# Patient Record
Sex: MALE | Race: WHITE | Employment: OTHER | URBAN - METROPOLITAN AREA
[De-identification: names, ages, dates, MRNs, and addresses within clinical notes are randomized per-mention and may not be internally consistent; named-entity substitution may affect disease eponyms.]

---

## 2017-01-03 PROBLEM — E11.65 UNCONTROLLED TYPE 2 DIABETES MELLITUS WITH HYPERGLYCEMIA, WITHOUT LONG-TERM CURRENT USE OF INSULIN (HCC): Status: ACTIVE | Noted: 2017-01-03

## 2017-02-11 ENCOUNTER — APPOINTMENT (OUTPATIENT)
Dept: CT IMAGING | Age: 67
DRG: 064 | End: 2017-02-11
Attending: EMERGENCY MEDICINE
Payer: MEDICARE

## 2017-02-11 ENCOUNTER — HOSPITAL ENCOUNTER (INPATIENT)
Age: 67
LOS: 3 days | Discharge: HOME | DRG: 064 | End: 2017-02-14
Attending: EMERGENCY MEDICINE | Admitting: FAMILY MEDICINE
Payer: MEDICARE

## 2017-02-11 ENCOUNTER — APPOINTMENT (OUTPATIENT)
Dept: GENERAL RADIOLOGY | Age: 67
DRG: 064 | End: 2017-02-11
Attending: EMERGENCY MEDICINE
Payer: MEDICARE

## 2017-02-11 DIAGNOSIS — R55 SYNCOPE AND COLLAPSE: Primary | ICD-10-CM

## 2017-02-11 DIAGNOSIS — R00.1 BRADYCARDIA: ICD-10-CM

## 2017-02-11 DIAGNOSIS — R29.898 WEAKNESS OF BOTH UPPER EXTREMITIES: ICD-10-CM

## 2017-02-11 DIAGNOSIS — R42 DIZZINESS: ICD-10-CM

## 2017-02-11 DIAGNOSIS — R29.898 WEAKNESS OF BOTH LOWER EXTREMITIES: ICD-10-CM

## 2017-02-11 LAB
ALBUMIN SERPL BCP-MCNC: 3.5 G/DL (ref 3.2–4.6)
ALBUMIN/GLOB SERPL: 0.9 {RATIO} (ref 1.2–3.5)
ALP SERPL-CCNC: 87 U/L (ref 50–136)
ALT SERPL-CCNC: 17 U/L (ref 12–65)
ANION GAP BLD CALC-SCNC: 10 MMOL/L (ref 7–16)
AST SERPL W P-5'-P-CCNC: 12 U/L (ref 15–37)
BASOPHILS # BLD AUTO: 0 K/UL (ref 0–0.2)
BASOPHILS # BLD: 0 % (ref 0–2)
BILIRUB SERPL-MCNC: 0.5 MG/DL (ref 0.2–1.1)
BNP SERPL-MCNC: 22 PG/ML
BUN SERPL-MCNC: 19 MG/DL (ref 8–23)
CALCIUM SERPL-MCNC: 8.5 MG/DL (ref 8.3–10.4)
CHLORIDE SERPL-SCNC: 105 MMOL/L (ref 98–107)
CO2 SERPL-SCNC: 26 MMOL/L (ref 21–32)
CREAT SERPL-MCNC: 0.95 MG/DL (ref 0.8–1.5)
DIFFERENTIAL METHOD BLD: ABNORMAL
EOSINOPHIL # BLD: 0.2 K/UL (ref 0–0.8)
EOSINOPHIL NFR BLD: 2 % (ref 0.5–7.8)
ERYTHROCYTE [DISTWIDTH] IN BLOOD BY AUTOMATED COUNT: 12 % (ref 11.9–14.6)
GLOBULIN SER CALC-MCNC: 3.7 G/DL (ref 2.3–3.5)
GLUCOSE BLD STRIP.AUTO-MCNC: 190 MG/DL (ref 65–100)
GLUCOSE SERPL-MCNC: 184 MG/DL (ref 65–100)
HCT VFR BLD AUTO: 41.9 % (ref 41.1–50.3)
HGB BLD-MCNC: 15 G/DL (ref 13.6–17.2)
IMM GRANULOCYTES # BLD: 0 K/UL (ref 0–0.5)
IMM GRANULOCYTES NFR BLD AUTO: 0.2 % (ref 0–5)
INR PPP: 1 (ref 0.9–1.2)
LACTATE BLD-SCNC: 1 MMOL/L (ref 0.5–1.9)
LYMPHOCYTES # BLD AUTO: 17 % (ref 13–44)
LYMPHOCYTES # BLD: 2 K/UL (ref 0.5–4.6)
MAGNESIUM SERPL-MCNC: 2 MG/DL (ref 1.8–2.4)
MCH RBC QN AUTO: 34 PG (ref 26.1–32.9)
MCHC RBC AUTO-ENTMCNC: 35.8 G/DL (ref 31.4–35)
MCV RBC AUTO: 95 FL (ref 79.6–97.8)
MONOCYTES # BLD: 0.7 K/UL (ref 0.1–1.3)
MONOCYTES NFR BLD AUTO: 6 % (ref 4–12)
NEUTS SEG # BLD: 8.5 K/UL (ref 1.7–8.2)
NEUTS SEG NFR BLD AUTO: 75 % (ref 43–78)
PLATELET # BLD AUTO: 289 K/UL (ref 150–450)
PMV BLD AUTO: 9.4 FL (ref 10.8–14.1)
POTASSIUM SERPL-SCNC: 3.6 MMOL/L (ref 3.5–5.1)
PROT SERPL-MCNC: 7.2 G/DL (ref 6.3–8.2)
PROTHROMBIN TIME: 11 SEC (ref 9.6–12)
RBC # BLD AUTO: 4.41 M/UL (ref 4.23–5.67)
SODIUM SERPL-SCNC: 141 MMOL/L (ref 136–145)
TROPONIN I BLD-MCNC: 0.01 NG/ML (ref 0–0.08)
WBC # BLD AUTO: 11.4 K/UL (ref 4.3–11.1)

## 2017-02-11 PROCEDURE — 96374 THER/PROPH/DIAG INJ IV PUSH: CPT | Performed by: EMERGENCY MEDICINE

## 2017-02-11 PROCEDURE — 85025 COMPLETE CBC W/AUTO DIFF WBC: CPT | Performed by: EMERGENCY MEDICINE

## 2017-02-11 PROCEDURE — 70450 CT HEAD/BRAIN W/O DYE: CPT

## 2017-02-11 PROCEDURE — 83605 ASSAY OF LACTIC ACID: CPT

## 2017-02-11 PROCEDURE — 83880 ASSAY OF NATRIURETIC PEPTIDE: CPT | Performed by: EMERGENCY MEDICINE

## 2017-02-11 PROCEDURE — 74011636320 HC RX REV CODE- 636/320: Performed by: EMERGENCY MEDICINE

## 2017-02-11 PROCEDURE — 84484 ASSAY OF TROPONIN QUANT: CPT

## 2017-02-11 PROCEDURE — 80053 COMPREHEN METABOLIC PANEL: CPT | Performed by: EMERGENCY MEDICINE

## 2017-02-11 PROCEDURE — 71010 XR CHEST PORT: CPT

## 2017-02-11 PROCEDURE — 74011250636 HC RX REV CODE- 250/636: Performed by: EMERGENCY MEDICINE

## 2017-02-11 PROCEDURE — 85610 PROTHROMBIN TIME: CPT | Performed by: EMERGENCY MEDICINE

## 2017-02-11 PROCEDURE — 99285 EMERGENCY DEPT VISIT HI MDM: CPT | Performed by: EMERGENCY MEDICINE

## 2017-02-11 PROCEDURE — 83735 ASSAY OF MAGNESIUM: CPT | Performed by: EMERGENCY MEDICINE

## 2017-02-11 PROCEDURE — 70496 CT ANGIOGRAPHY HEAD: CPT

## 2017-02-11 PROCEDURE — 82962 GLUCOSE BLOOD TEST: CPT

## 2017-02-11 PROCEDURE — 93005 ELECTROCARDIOGRAM TRACING: CPT | Performed by: EMERGENCY MEDICINE

## 2017-02-11 PROCEDURE — 65270000029 HC RM PRIVATE

## 2017-02-11 PROCEDURE — 74011000258 HC RX REV CODE- 258: Performed by: EMERGENCY MEDICINE

## 2017-02-11 PROCEDURE — 74011250637 HC RX REV CODE- 250/637: Performed by: FAMILY MEDICINE

## 2017-02-11 RX ORDER — FENTANYL CITRATE 50 UG/ML
75 INJECTION, SOLUTION INTRAMUSCULAR; INTRAVENOUS ONCE
Status: COMPLETED | OUTPATIENT
Start: 2017-02-11 | End: 2017-02-11

## 2017-02-11 RX ORDER — SODIUM CHLORIDE 0.9 % (FLUSH) 0.9 %
5-10 SYRINGE (ML) INJECTION EVERY 8 HOURS
Status: DISCONTINUED | OUTPATIENT
Start: 2017-02-11 | End: 2017-02-14 | Stop reason: HOSPADM

## 2017-02-11 RX ORDER — ENOXAPARIN SODIUM 100 MG/ML
40 INJECTION SUBCUTANEOUS EVERY 24 HOURS
Status: DISCONTINUED | OUTPATIENT
Start: 2017-02-12 | End: 2017-02-14

## 2017-02-11 RX ORDER — PANTOPRAZOLE SODIUM 40 MG/1
40 TABLET, DELAYED RELEASE ORAL
Status: DISCONTINUED | OUTPATIENT
Start: 2017-02-12 | End: 2017-02-14 | Stop reason: HOSPADM

## 2017-02-11 RX ORDER — DEXTROSE 40 %
15 GEL (GRAM) ORAL AS NEEDED
Status: DISCONTINUED | OUTPATIENT
Start: 2017-02-11 | End: 2017-02-14 | Stop reason: HOSPADM

## 2017-02-11 RX ORDER — ACETAMINOPHEN 325 MG/1
650 TABLET ORAL
Status: DISCONTINUED | OUTPATIENT
Start: 2017-02-11 | End: 2017-02-14 | Stop reason: HOSPADM

## 2017-02-11 RX ORDER — DEXTROSE 50 % IN WATER (D50W) INTRAVENOUS SYRINGE
25-50 AS NEEDED
Status: DISCONTINUED | OUTPATIENT
Start: 2017-02-11 | End: 2017-02-14 | Stop reason: HOSPADM

## 2017-02-11 RX ORDER — BISACODYL 5 MG
5 TABLET, DELAYED RELEASE (ENTERIC COATED) ORAL DAILY PRN
Status: DISCONTINUED | OUTPATIENT
Start: 2017-02-11 | End: 2017-02-14 | Stop reason: HOSPADM

## 2017-02-11 RX ORDER — SODIUM CHLORIDE 0.9 % (FLUSH) 0.9 %
10 SYRINGE (ML) INJECTION
Status: COMPLETED | OUTPATIENT
Start: 2017-02-11 | End: 2017-02-11

## 2017-02-11 RX ORDER — LOSARTAN POTASSIUM 25 MG/1
25 TABLET ORAL DAILY
Status: DISCONTINUED | OUTPATIENT
Start: 2017-02-12 | End: 2017-02-14 | Stop reason: HOSPADM

## 2017-02-11 RX ORDER — SODIUM CHLORIDE 0.9 % (FLUSH) 0.9 %
5-10 SYRINGE (ML) INJECTION AS NEEDED
Status: DISCONTINUED | OUTPATIENT
Start: 2017-02-11 | End: 2017-02-14 | Stop reason: HOSPADM

## 2017-02-11 RX ORDER — ATORVASTATIN CALCIUM 40 MG/1
40 TABLET, FILM COATED ORAL
Status: DISCONTINUED | OUTPATIENT
Start: 2017-02-11 | End: 2017-02-14 | Stop reason: HOSPADM

## 2017-02-11 RX ORDER — ASPIRIN 81 MG/1
81 TABLET ORAL DAILY
Status: DISCONTINUED | OUTPATIENT
Start: 2017-02-12 | End: 2017-02-14

## 2017-02-11 RX ADMIN — ACETAMINOPHEN 650 MG: 325 TABLET, FILM COATED ORAL at 23:49

## 2017-02-11 RX ADMIN — IOVERSOL 80 ML: 741 INJECTION INTRA-ARTERIAL; INTRAVENOUS at 20:30

## 2017-02-11 RX ADMIN — ATORVASTATIN CALCIUM 40 MG: 40 TABLET, FILM COATED ORAL at 23:49

## 2017-02-11 RX ADMIN — Medication 10 ML: at 20:30

## 2017-02-11 RX ADMIN — Medication 10 ML: at 23:51

## 2017-02-11 RX ADMIN — SODIUM CHLORIDE 100 ML: 900 INJECTION, SOLUTION INTRAVENOUS at 20:30

## 2017-02-11 RX ADMIN — FENTANYL CITRATE 75 MCG: 50 INJECTION, SOLUTION INTRAMUSCULAR; INTRAVENOUS at 19:11

## 2017-02-11 NOTE — IP AVS SNAPSHOT
[address] 
[phone] Patient: Frankey Crome MRN: [id number] HTM:9/4/0623 You are allergic to the following No active allergies Immunizations Administered for This Admission Name Date  
 TB Skin Test (PPD) Intradermal  Deferred () Recent Documentation Height Weight BMI Smoking Status 1.854 m 86.8 kg 25.25 kg/m2 Former Smoker Emergency Contacts Name Discharge Info Relation Home Work Adri Fraire  Spouse [3] [phone] About your hospitalization You were admitted on:  February 11, 2017 You last received care in the:  [facility] 2 SURGICAL You were discharged on:  February 14, 2017 Unit phone number:  [phone] Why you were hospitalized Your primary diagnosis was:  Syncope Your diagnoses also included: Tobacco Use Disorder, Chronic Kidney Disease, Stage Ii (Mild), Essential Hypertension, Uncontrolled Type 2 Diabetes Mellitus With Hyperglycemia, Without Long-Term Current Use Of Insulin (Hcc), Weakness Of Both Arms Providers Seen During Your Hospitalizations Provider Role Specialty Primary office phone Raj Haro MD Attending Provider Emergency Medicine [phone] Tae Mcfarland MD Attending Provider [facility] [phone] Joaquim Easton MD Attending Provider Internal Medicine [phone] Your Primary Care Physician (PCP) Primary Care Physician Office Phone Office Fax Pilar ROSAS [phone] ** None ** Follow-up Information Follow up With Details Comments Contact Info Laura Arce MD On 2/23/2017 2:30 pm [address] [place] 
[phone] Dr Quintin Ang for F/U , Dr Lázaro Johns to call and arrange appointment Your Appointments Thursday February 23, 2017  2:30 PM EST Extended Office Visit with Laura Arce MD  
 [facility] [place] [id number]  
[phone] Monday March 06, 2017  8:00 AM EST Office Visit with Stephen Barnett MD  
[facility]) [place] [id number]  
[phone] Bring all medications, insurance and prescription cards and please be prepared to pay any copay that may be due. Current Discharge Medication List  
  
START taking these medications Dose & Instructions Dispensing Information Comments Morning Noon Evening Bedtime  
 aspirin 325 mg tablet Commonly known as:  ASPIRIN Start taking on:  2/15/2017 Replaces:  aspirin delayed-release 81 mg tablet Dose:  325 mg Take 1 Tab by mouth daily. Quantity:  30 Tab Refills:  0  
     
2/15/17 CONTINUE these medications which have NOT CHANGED Dose & Instructions Dispensing Information Comments Morning Noon Evening Bedtime  
 atorvastatin 40 mg tablet Commonly known as:  LIPITOR Dose:  40 mg Take 1 Tab by mouth nightly. Quantity:  30 Tab Refills:  3 Blood-Glucose Meter monitoring kit Commonly known as:  [address]   [address] KIT Check blood sugars once daily. Check fasting or 2 hours after a meal. DX E11.65 Quantity:  1 Kit Refills:  0  
     
   
   
   
  
 glucose blood VI test strips strip Commonly known as:  ONETOUCH ULTRA TEST Check blood sugars once daily. Check fasting or 2 hours after a meal. DX E11.65 Quantity:  100 Strip Refills:  3  
     
   
   
   
  
 * Lancets Misc Commonly known as:  ONETOUCH ULTRASOFT LANCETS Check blood sugars once daily. Check fasting or 2 hours after a meal. Dx:  E11.65 Quantity:  100 Each Refills:  3  
     
   
   
   
  
 * lancets 30 gauge Misc Commonly known as:  [employer] LANCETS Check blood sugars once daily. Check fasting or 2 hours after a meal. Dx:  E11.65 Quantity:  100 Lancet Refills:  3  
     
   
   
   
  
 losartan 25 mg tablet Commonly known as:  COZAAR Dose:  25 mg Take 1 Tab by mouth daily. Quantity:  30 Tab Refills:  2  
     
   
   
   
  
 metFORMIN 500 mg tablet Commonly known as:  GLUCOPHAGE Dose:  500 mg Take 1 Tab by mouth two (2) times daily (with meals). Quantity:  60 Tab Refills:  2  
     
2/15/17 2/14/17  
   
  
 nitroglycerin 0.4 mg SL tablet Commonly known as:  NITROSTAT Dose:  0.4 mg  
1 Tab by SubLINGual route every five (5) minutes as needed for Chest Pain. Quantity:  25 Tab Refills:  11  
     
   
   
   
  
 omeprazole 20 mg capsule Commonly known as:  PRILOSEC  
   
 1 tablet daily 30 minutes before a meal  
 Quantity:  90 Cap Refills:  3 2/15/17 * Notice: This list has 2 medication(s) that are the same as other medications prescribed for you. Read the directions carefully, and ask your doctor or other care provider to review them with you. STOP taking these medications   
 aspirin delayed-release 81 mg tablet Replaced by:  aspirin 325 mg tablet  
   
  
 celecoxib 200 mg capsule Commonly known as:  CELEBREX Where to Get Your Medications Information on where to get these meds will be given to you by the nurse or doctor. ! Ask your nurse or doctor about these medications  
  aspirin 325 mg tablet Discharge Instructions DISCHARGE SUMMARY from Nurse The following personal items are in your possession at time of discharge: 
 
Dental Appliances: Uppers, With patient Home Medications: None Jewelry: Ring Clothing: Footwear, Pants, Shirt Other Valuables: None PATIENT INSTRUCTIONS: 
 
 After general anesthesia or intravenous sedation, for 24 hours or while taking prescription Narcotics: · Limit your activities · Do not drive and operate hazardous machinery · Do not make important personal or business decisions · Do  not drink alcoholic beverages · If you have not urinated within 8 hours after discharge, please contact your surgeon on call. Report the following to your surgeon: 
· Excessive pain, swelling, redness or odor of or around the surgical area · Temperature over 100.5 · Nausea and vomiting lasting longer than 4 hours or if unable to take medications · Any signs of decreased circulation or nerve impairment to extremity: change in color, persistent  numbness, tingling, coldness or increase pain · Any questions What to do at Home: 
Recommended activity: Activity as tolerated, per MD instructions If you experience any of the following symptoms fever > 101, nausea, vomiting, pain, chest pain, shortness of breath please follow up with MD. 
 
 
*  Please give a list of your current medications to your Primary Care Provider. *  Please update this list whenever your medications are discontinued, doses are 
    changed, or new medications (including over-the-counter products) are added. *  Please carry medication information at all times in case of emergency situations. These are general instructions for a healthy lifestyle: No smoking/ No tobacco products/ Avoid exposure to second hand smoke Surgeon General's Warning:  Quitting smoking now greatly reduces serious risk to your health. Obesity, smoking, and sedentary lifestyle greatly increases your risk for illness A healthy diet, regular physical exercise & weight monitoring are important for maintaining a healthy lifestyle You may be retaining fluid if you have a history of heart failure or if you experience any of the following symptoms:  Weight gain of 3 pounds or more overnight or 5 pounds in a week, increased swelling in our hands or feet or shortness of breath while lying flat in bed. Please call your doctor as soon as you notice any of these symptoms; do not wait until your next office visit. Recognize signs and symptoms of STROKE: 
 
F-face looks uneven A-arms unable to move or move unevenly S-speech slurred or non-existent T-time-call 911 as soon as signs and symptoms begin-DO NOT go Back to bed or wait to see if you get better-TIME IS BRAIN. Warning Signs of HEART ATTACK Call 911 if you have these symptoms: 
? Chest discomfort. Most heart attacks involve discomfort in the center of the chest that lasts more than a few minutes, or that goes away and comes back. It can feel like uncomfortable pressure, squeezing, fullness, or pain. ? Discomfort in other areas of the upper body. Symptoms can include pain or discomfort in one or both arms, the back, neck, jaw, or stomach. ? Shortness of breath with or without chest discomfort. ? Other signs may include breaking out in a cold sweat, nausea, or lightheadedness. Don't wait more than five minutes to call [address]! Fast action can save your life. Calling 911 is almost always the fastest way to get lifesaving treatment. Emergency Medical Services staff can begin treatment when they arrive  up to an hour sooner than if someone gets to the hospital by car. The discharge information has been reviewed with the patient. The patient verbalized understanding. Discharge medications reviewed with the patient and appropriate educational materials and side effects teaching were provided. Fainting: Care Instructions Your Care Instructions When you faint, or pass out, you lose consciousness for a short time. A brief drop in blood flow to the brain often causes it. When you fall or lie down, more blood flows to your brain and you regain consciousness. Emotional stress, pain, or overheatingespecially if you have been standingcan make you faint. In these cases, fainting is usually not serious. But fainting can be a sign of a more serious problem. Your doctor may want you to have more tests to rule out other causes. The treatment you need depends on the reason why you fainted. The doctor has checked you carefully, but problems can develop later. If you notice any problems or new symptoms, get medical treatment right away. Follow-up care is a key part of your treatment and safety. Be sure to make and go to all appointments, and call your doctor if you are having problems. It's also a good idea to know your test results and keep a list of the medicines you take. How can you care for yourself at home? · Drink plenty of fluids to prevent dehydration. If you have kidney, heart, or liver disease and have to limit fluids, talk with your doctor before you increase your fluid intake. When should you call for help? Call 911 anytime you think you may need emergency care. For example, call if: 
· You have symptoms of a heart problem. These may include: ¨ Chest pain or pressure. ¨ Severe trouble breathing. ¨ A fast or irregular heartbeat. ¨ Lightheadedness or sudden weakness. ¨ Coughing up pink, foamy mucus. ¨ Passing out. After you call 911, the  may tell you to chew 1 adult-strength or 2 to 4 low-dose aspirin. Wait for an ambulance. Do not try to drive yourself. · You have symptoms of a stroke. These may include: 
¨ Sudden numbness, tingling, weakness, or loss of movement in your face, arm, or leg, especially on only one side of your body. ¨ Sudden vision changes. ¨ Sudden trouble speaking. ¨ Sudden confusion or trouble understanding simple statements. ¨ Sudden problems with walking or balance. ¨ A sudden, severe headache that is different from past headaches. · You passed out (lost consciousness) again. Watch closely for changes in your health, and be sure to contact your doctor if: 
· You do not get better as expected. Where can you learn more? Go to http://jose angel-dalia.info/. Enter K293 in the search box to learn more about \"Fainting: Care Instructions. \" Current as of: May 27, 2016 Content Version: 11.1 © 7703-4134 [employer]. Care instructions adapted under license by [employer] (which disclaims liability or warranty for this information). If you have questions about a medical condition or this instruction, always ask your healthcare professional. [place] [address] any warranty or liability for your use of this information. Discharge Orders Procedure Order Date Status Priority Quantity Spec Type Associated Dx NURSING-MISCELLANEOUS: Please print DC summary for pt at time of discharge 02/14/17 1112 Normal Routine 1 Questions: Description of Order:  Please print DC summary for pt at time of discharge ACO Transitions of Care Introducing [employer] [address] offers a voluntary care coordination program to provide high quality service and care to [facility] fee-for-service beneficiaries. Behzad Hernandez was designed to help you enhance your health and well-being through the following services: ? Transitions of Care  support for individuals who are transitioning from one care setting to another (example: Hospital to home). ? Chronic and Complex Care Coordination  support for individuals and caregivers of those with serious or chronic illnesses or with more than one chronic (ongoing) condition and those who take a number of different medications. If you meet specific medical criteria, a [facility] [address] may call you directly to coordinate your care with your primary care physician and your other care providers. For questions about the [facility] programs, please, contact your physicians office. For general questions or additional information about Accountable Care Organizations: 
Please visit www.medicare.gov/acos. html or call 1-800-MEDICARE (4-372.210.9430) TTY users should call 6-216.135.2788. [employer] Announcement We are excited to announce that we are making your provider's discharge notes available to you in [employer]. You will see these notes when they are completed and signed by the physician that discharged you from your recent hospital stay. If you have any questions or concerns about any information you see in [employer], please call the Health Information Department where you were seen or reach out to your Primary Care Provider for more information about your plan of care. Introducing [facility]! Juan Larson introduces [employer] patient portal. Now you can access parts of your medical record, email your doctor's office, and request medication refills online. 1. In your internet browser, go to [email or web address] 2. Click on the First Time User? Click Here link in the Sign In box. You will see the New Member Sign Up page. 3. Enter your [employer] Access Code exactly as it appears below. You will not need to use this code after youve completed the sign-up process. If you do not sign up before the expiration date, you must request a new code. · [employer] Access Code: [email or web address] Expires: 3/8/2017  4:43 PM 
 
4. Enter the last four digits of your Social Security Number (xxxx) and Date of Birth (mm/dd/yyyy) as indicated and click Submit. You will be taken to the next sign-up page. 5. Create a [employer] ID. This will be your [employer] login ID and cannot be changed, so think of one that is secure and easy to remember. 6. Create a [employer] password. You can change your password at any time. 7. Enter your Password Reset Question and Answer.  This can be used at a later time if you forget your password. 8. Enter your e-mail address. You will receive e-mail notification when new information is available in [address] 9. Click Sign Up. You can now view and download portions of your medical record. 10. Click the Download Summary menu link to download a portable copy of your medical information. If you have questions, please visit the Frequently Asked Questions section of the [employer] website. Remember, [employer] is NOT to be used for urgent needs. For medical emergencies, dial 911. Now available from your iPhone and Android! General Information Please provide this summary of care documentation to your next provider. Patient Signature:  ____________________________________________________________ Date:  ____________________________________________________________  
  
Michelle Aburto Provider Signature:  ____________________________________________________________ Date:  ____________________________________________________________

## 2017-02-11 NOTE — ED TRIAGE NOTES
Patient reports 1 episode of chest pain and weakness. States this afternoon had increasing weakness. Family drove patient partially to hospital and that then called EMS. Per EMS, patient was pale, sweaty, cyanotic, and complaining of weakness. Heart rate was in 30s. Patient given 0.5mg Atropine and rate increased to 80s. Patient reports weakness, States unable to raise arms. States shoulder pain and neck pain. Provider at bedside.

## 2017-02-12 ENCOUNTER — APPOINTMENT (OUTPATIENT)
Dept: MRI IMAGING | Age: 67
DRG: 064 | End: 2017-02-12
Attending: PSYCHIATRY & NEUROLOGY
Payer: MEDICARE

## 2017-02-12 ENCOUNTER — APPOINTMENT (OUTPATIENT)
Dept: MRI IMAGING | Age: 67
DRG: 064 | End: 2017-02-12
Attending: FAMILY MEDICINE
Payer: MEDICARE

## 2017-02-12 PROBLEM — R29.898 WEAKNESS OF BOTH ARMS: Status: ACTIVE | Noted: 2017-02-12

## 2017-02-12 LAB
ANION GAP BLD CALC-SCNC: 9 MMOL/L (ref 7–16)
ATRIAL RATE: 84 BPM
BUN SERPL-MCNC: 19 MG/DL (ref 8–23)
CALCIUM SERPL-MCNC: 8.8 MG/DL (ref 8.3–10.4)
CALCULATED P AXIS, ECG09: 68 DEGREES
CALCULATED R AXIS, ECG10: 75 DEGREES
CALCULATED T AXIS, ECG11: 41 DEGREES
CHLORIDE SERPL-SCNC: 105 MMOL/L (ref 98–107)
CHOLEST SERPL-MCNC: 211 MG/DL
CO2 SERPL-SCNC: 26 MMOL/L (ref 21–32)
CREAT SERPL-MCNC: 0.92 MG/DL (ref 0.8–1.5)
DIAGNOSIS, 93000: NORMAL
DIASTOLIC BP, ECG02: NORMAL MMHG
ERYTHROCYTE [DISTWIDTH] IN BLOOD BY AUTOMATED COUNT: 12.2 % (ref 11.9–14.6)
EST. AVERAGE GLUCOSE BLD GHB EST-MCNC: 206 MG/DL
GLUCOSE BLD STRIP.AUTO-MCNC: 170 MG/DL (ref 65–100)
GLUCOSE BLD STRIP.AUTO-MCNC: 246 MG/DL (ref 65–100)
GLUCOSE BLD STRIP.AUTO-MCNC: 303 MG/DL (ref 65–100)
GLUCOSE SERPL-MCNC: 233 MG/DL (ref 65–100)
HBA1C MFR BLD: 8.8 % (ref 4.8–6)
HCT VFR BLD AUTO: 42.3 % (ref 41.1–50.3)
HDLC SERPL-MCNC: 30 MG/DL (ref 40–60)
HDLC SERPL: 7 {RATIO}
HGB BLD-MCNC: 14.8 G/DL (ref 13.6–17.2)
LDLC SERPL CALC-MCNC: 128.8 MG/DL
LIPID PROFILE,FLP: ABNORMAL
MAGNESIUM SERPL-MCNC: 2.2 MG/DL (ref 1.8–2.4)
MCH RBC QN AUTO: 34.1 PG (ref 26.1–32.9)
MCHC RBC AUTO-ENTMCNC: 35 G/DL (ref 31.4–35)
MCV RBC AUTO: 97.5 FL (ref 79.6–97.8)
P-R INTERVAL, ECG05: 198 MS
PLATELET # BLD AUTO: 294 K/UL (ref 150–450)
PMV BLD AUTO: 9.6 FL (ref 10.8–14.1)
POTASSIUM SERPL-SCNC: 3.7 MMOL/L (ref 3.5–5.1)
Q-T INTERVAL, ECG07: 378 MS
QRS DURATION, ECG06: 104 MS
QTC CALCULATION (BEZET), ECG08: 446 MS
RBC # BLD AUTO: 4.34 M/UL (ref 4.23–5.67)
SODIUM SERPL-SCNC: 140 MMOL/L (ref 136–145)
SYSTOLIC BP, ECG01: NORMAL MMHG
T4 FREE SERPL-MCNC: 1.1 NG/DL (ref 0.78–1.46)
TRIGL SERPL-MCNC: 261 MG/DL (ref 35–150)
TSH SERPL DL<=0.005 MIU/L-ACNC: 0.58 UIU/ML (ref 0.36–3.74)
VENTRICULAR RATE, ECG03: 84 BPM
VLDLC SERPL CALC-MCNC: 52.2 MG/DL (ref 6–23)
WBC # BLD AUTO: 9.2 K/UL (ref 4.3–11.1)

## 2017-02-12 PROCEDURE — 84443 ASSAY THYROID STIM HORMONE: CPT | Performed by: FAMILY MEDICINE

## 2017-02-12 PROCEDURE — 80048 BASIC METABOLIC PNL TOTAL CA: CPT | Performed by: FAMILY MEDICINE

## 2017-02-12 PROCEDURE — 80061 LIPID PANEL: CPT | Performed by: FAMILY MEDICINE

## 2017-02-12 PROCEDURE — 84439 ASSAY OF FREE THYROXINE: CPT | Performed by: FAMILY MEDICINE

## 2017-02-12 PROCEDURE — 84481 FREE ASSAY (FT-3): CPT | Performed by: FAMILY MEDICINE

## 2017-02-12 PROCEDURE — 65270000029 HC RM PRIVATE

## 2017-02-12 PROCEDURE — 74011250637 HC RX REV CODE- 250/637: Performed by: FAMILY MEDICINE

## 2017-02-12 PROCEDURE — 83036 HEMOGLOBIN GLYCOSYLATED A1C: CPT | Performed by: FAMILY MEDICINE

## 2017-02-12 PROCEDURE — 74011636637 HC RX REV CODE- 636/637: Performed by: FAMILY MEDICINE

## 2017-02-12 PROCEDURE — 92610 EVALUATE SWALLOWING FUNCTION: CPT

## 2017-02-12 PROCEDURE — 82962 GLUCOSE BLOOD TEST: CPT

## 2017-02-12 PROCEDURE — 70553 MRI BRAIN STEM W/O & W/DYE: CPT

## 2017-02-12 PROCEDURE — 74011250636 HC RX REV CODE- 250/636: Performed by: FAMILY MEDICINE

## 2017-02-12 PROCEDURE — 72141 MRI NECK SPINE W/O DYE: CPT

## 2017-02-12 PROCEDURE — A9577 INJ MULTIHANCE: HCPCS | Performed by: FAMILY MEDICINE

## 2017-02-12 PROCEDURE — 36415 COLL VENOUS BLD VENIPUNCTURE: CPT | Performed by: FAMILY MEDICINE

## 2017-02-12 PROCEDURE — 77030027138 HC INCENT SPIROMETER -A

## 2017-02-12 PROCEDURE — 85027 COMPLETE CBC AUTOMATED: CPT | Performed by: FAMILY MEDICINE

## 2017-02-12 PROCEDURE — 83735 ASSAY OF MAGNESIUM: CPT | Performed by: FAMILY MEDICINE

## 2017-02-12 RX ORDER — SODIUM CHLORIDE 0.9 % (FLUSH) 0.9 %
10 SYRINGE (ML) INJECTION
Status: COMPLETED | OUTPATIENT
Start: 2017-02-12 | End: 2017-02-12

## 2017-02-12 RX ORDER — HYDROCODONE BITARTRATE AND ACETAMINOPHEN 7.5; 325 MG/1; MG/1
1 TABLET ORAL
Status: DISCONTINUED | OUTPATIENT
Start: 2017-02-12 | End: 2017-02-14 | Stop reason: HOSPADM

## 2017-02-12 RX ADMIN — LOSARTAN POTASSIUM 25 MG: 25 TABLET, FILM COATED ORAL at 08:16

## 2017-02-12 RX ADMIN — INSULIN HUMAN 4 UNITS: 100 INJECTION, SOLUTION PARENTERAL at 08:16

## 2017-02-12 RX ADMIN — ASPIRIN 81 MG: 81 TABLET, COATED ORAL at 08:15

## 2017-02-12 RX ADMIN — INSULIN HUMAN 8 UNITS: 100 INJECTION, SOLUTION PARENTERAL at 17:08

## 2017-02-12 RX ADMIN — ACETAMINOPHEN 650 MG: 325 TABLET, FILM COATED ORAL at 16:31

## 2017-02-12 RX ADMIN — GADOBENATE DIMEGLUMINE 17 ML: 529 INJECTION, SOLUTION INTRAVENOUS at 11:57

## 2017-02-12 RX ADMIN — Medication 10 ML: at 17:12

## 2017-02-12 RX ADMIN — PANTOPRAZOLE SODIUM 40 MG: 40 TABLET, DELAYED RELEASE ORAL at 08:15

## 2017-02-12 RX ADMIN — INSULIN HUMAN 2 UNITS: 100 INJECTION, SOLUTION PARENTERAL at 21:16

## 2017-02-12 RX ADMIN — ATORVASTATIN CALCIUM 40 MG: 40 TABLET, FILM COATED ORAL at 21:07

## 2017-02-12 RX ADMIN — ENOXAPARIN SODIUM 40 MG: 40 INJECTION SUBCUTANEOUS at 09:27

## 2017-02-12 RX ADMIN — Medication 10 ML: at 21:08

## 2017-02-12 RX ADMIN — Medication 10 ML: at 07:09

## 2017-02-12 NOTE — PROGRESS NOTES
Problem: Dysphagia (Adult)  Goal: *Acute Goals and Plan of Care (Insert Text)  Goals   None    SPEECH LANGUAGE PATHOLOGY: BEDSIDE SWALLOW NOTE: INITIAL ASSESSMENT AND DISCHARGE     NAME/AGE/GENDER: Isael Richardson is a 77 y.o. male  DATE: 2/12/2017  PRIMARY DIAGNOSIS: Syncope       ICD-10: Treatment Diagnosis: R13.12 Dysphagia, Oropharyngeal Phase  INTERDISCIPLINARY COLLABORATION: Registered Nurse  PRECAUTIONS/ALLERGIES: Review of patient's allergies indicates no known allergies. ASSESSMENT:   Based on the objective data described below, Mr. Evelyn Tavares presents with swallow function within normal limits. No s/sx with any/all consistencies. Prompt, strong swallow with adequate laryngeal elevation and excursion noted on palpation. Patient and wife report no change in speech or swallow function with episodes, although patient does report vomiting with mobility yesterday alongside dizziness. Recommend regular diet textures, thin liquids. No further speech therapy services indicated. ?????? ? ? This section established at most recent assessment??????????  PROBLEM LIST (Impairments causing functional limitations):  1. ? TIA         PLAN OF CARE:   Patient will benefit from skilled intervention to address the following impairments. RECOMMENDATIONS AND PLANNED INTERVENTIONS (Benefits and precautions of therapy have been discussed with the patient.):  · continue prescribed diet  MEDICATIONS:  · With liquid  COMPENSATORY STRATEGIES/MODIFICATIONS INCLUDING:  · None  OTHER RECOMMENDATIONS (including follow up treatment recommendations): · Family training/education  · Patient education  RECOMMENDED DIET MODIFICATIONS DISCUSSED WITH:  · Nursing  · Family  · Patient  FREQUENCY/DURATION: Discontinue   RECOMMENDED REHABILITATION/EQUIPMENT: (at time of discharge pending progress):   None.       SUBJECTIVE:   \"I just got dizzy and threw up each time I got up\"  History of Present Injury/Illness: Mr. Evelyn Tavares  has a past medical history of Acute myocardial infarction, subendocardial infarction, initial episode of care [facility]) (5/29/2014); CAD (coronary artery disease); Chronic kidney disease, stage II (mild) (5/29/2014); Hyperglycemia (5/29/2014); and Tobacco use disorder (5/29/2014). Chandler Sanchez He also  has a past surgical history that includes orthopaedic; left heart cath,percutaneous (5/29/2014); colonoscopy (10/16/15); and endoscopy (10/16/15). Present Symptoms:    Pain Intensity 1: 3  Current Medications:   No current facility-administered medications on file prior to encounter. Current Outpatient Prescriptions on File Prior to Encounter   Medication Sig Dispense Refill    metFORMIN (GLUCOPHAGE) 500 mg tablet Take 1 Tab by mouth two (2) times daily (with meals). 60 Tab 2    losartan (COZAAR) 25 mg tablet Take 1 Tab by mouth daily. 30 Tab 2    lancets (ONETOUCH DELICA LANCETS) 30 gauge misc Check blood sugars once daily. Check fasting or 2 hours after a meal. Dx:  E11.65 100 Lancet 3    Lancets (ONETOUCH ULTRASOFT LANCETS) misc Check blood sugars once daily. Check fasting or 2 hours after a meal. Dx:  E11.65 100 Each 3    Blood-Glucose Meter (ONETOUCH ULTRA SYSTEM KIT) monitoring kit Check blood sugars once daily. Check fasting or 2 hours after a meal. DX E11.65 1 Kit 0    glucose blood VI test strips (ONETOUCH ULTRA TEST) strip Check blood sugars once daily. Check fasting or 2 hours after a meal. DX E11.65 100 Strip 3    atorvastatin (LIPITOR) 40 mg tablet Take 1 Tab by mouth nightly. 30 Tab 3    celecoxib (CELEBREX) 200 mg capsule Take 1 Cap by mouth daily. 30 Cap 2    aspirin delayed-release 81 mg tablet Take  by mouth daily.  omeprazole (PRILOSEC) 20 mg capsule 1 tablet daily 30 minutes before a meal 90 Cap 3    nitroglycerin (NITROSTAT) 0.4 mg SL tablet 1 Tab by SubLINGual route every five (5) minutes as needed for Chest Pain.  25 Tab 11     Current Dietary Status:  Regular/thin           History of reflux:  YES   · Reflux medication:prilosec  Social History/Home Situation:    Home Environment: Private residence  One/Two Story Residence: One story  Living Alone: No  Support Systems: Family member(s), Spouse/Significant Other/Partner  Patient Expects to be Discharged to[de-identified] Private residence  Current DME Used/Available at Home: Glucometer      OBJECTIVE:   Respiratory Status:        CXR Results: IMPRESSION:   1. No acute cardiopulmonary process evident on single frontal view of the  chest.  MRI PENDING  CT Results:No acute intracranial process  Oral Motor Structure/Speech:  Oral-Motor Structure/Motor Speech  Labial: No impairment  Dentition: Intact  Oral Hygiene: good  Lingual: No impairment     Cognitive and Communication Status:  Neurologic State: Alert  Orientation Level: Appropriate for age  Cognition: Appropriate decision making; Appropriate for age attention/concentration  Perception: Appears intact  Perseveration: No perseveration noted  Safety/Judgement: Awareness of environment     BEDSIDE SWALLOW EVALUATION  Oral Assessment:  Oral Assessment  Labial: No impairment  Dentition: Intact  Oral Hygiene: good  Lingual: No impairment  P.O. Trials:  Patient Position: upright in bed     The patient was given bite/sip amounts of the following:   Consistency Presented: Thin liquid; Solid;Puree;Mixed consistency; Ice chips  How Presented: Self-fed/presented;SLP-fed/presented;Cup/sip;Spoon;Straw;Successive swallows     ORAL PHASE:  Bolus Acceptance: No impairment  Bolus Formation/Control: No impairment  Propulsion: No impairment     Oral Residue: None     PHARYNGEAL PHASE:  Initiation of Swallow: No impairment  Laryngeal Elevation: Functional  Aspiration Signs/Symptoms: None  Vocal Quality: No impairment           Pharyngeal Phase Characteristics: No impairment, issues, or problems      OTHER OBSERVATIONS:  Rate/bite size: WNL         Endurance:   WNL                  Tool Used: Dysphagia Outcome and Severity Scale (ELIZABETH)     Score Comments Normal Diet  [X] 7 With no strategies or extra time needed   Functional Swallow  [ ] 6 May have mild oral or pharyngeal delay         Mild Dysphagia     [ ] 5 Which may require one diet consistency restricted (those who demonstrate penetration which is entirely cleared on MBS would be included)   Mild-Moderate Dysphagia  [ ] 4 With 1-2 diet consistencies restricted         Moderate Dysphagia  [ ] 3 With 2 or more diet consistencies restricted         Moderately Severe Dysphagia  [ ] 2 With partial PO strategies (trials with ST only)         Severe Dysphagia  [ ] 1 With inability to tolerate any PO safely            Score:  Initial: 7 Most Recent: X (Date: -- )   Interpretation of Tool: The Dysphagia Outcome and Severity Scale (ELIZABETH) is a simple, easy-to-use, 7-point scale developed to systematically rate the functional severity of dysphagia based on objective assessment and make recommendations for diet level, independence level, and type of nutrition.        Score 7 6 5 4 3 2 1   Modifier CH CI CJ CK CL CM CN   · Swallowing:               - CURRENT STATUS:           CH - 0% impaired, limited or restricted               - GOAL STATUS:                   CH - 0% impaired, limited or restricted               - D/C STATUS:                       [address] [place] [address] - 0% impaired, limited or restricted  Payor: Marvel Nichols / Plan: [facility] / Product Type: Managed Care Medicare /       TREATMENT:         (In addition to Assessment/Re-Assessment sessions the following treatments were rendered)  Assessment/Reassessment only, no treatment provided today  MODALITIES:                                                                     ORAL MOTOR  EXERCISES:                                                                                                                                                                       LARYNGEAL / PHARYNGEAL EXERCISES: __________________________________________________________________________________________________  Safety:   After treatment position/precautions:  · Call light within reach  · Family at bedside  · Upright in Bed  Treatment Assessment:  Patient actively participated in evaluation. Progression/Medical Necessity:   · Discontinue  Compliance with Program/Exercises: compliant all of the time. Reason for Continuation of Services/Other Comments:  · Discontinue  Recommendations/Intent for next treatment session: Discontinue  Total Treatment Duration:  Time In: 3725  Time Out: [address].  MS Janay, CCC-SLP

## 2017-02-12 NOTE — PROGRESS NOTES
Neurosurgery:  Chart reviewed. MRI's  of brain and cervical spine reviewed. CTA study reviewed. 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 He has multilevel cervical spinal stenosis which could potentially be the culprit in is transient quadraparesis. I would also question the possibility of a cord ischemic event given the history at hand. No emergent surgery is indicated. A F/U MRI of cervical spine might be helpful for evaluation of possible cord infarction. Will notify Dr. Arnel Willosn of patient's presence.

## 2017-02-12 NOTE — PROGRESS NOTES
PT note: Per ROBERT Thomas), pt is to be on hold for all therapies today per order from Dr. Vinie Ganser. Pt is medically unstable and not appropriate to participate in PT. Will attempt again at a later time following further examination by neuro and MRI results. No treatment provided.  Mary Dorman, PT

## 2017-02-12 NOTE — PROGRESS NOTES
OT note:     Per ROBERT Elmore), pt is to be on hold for all therapies today per order from Dr. Adama Monterroso. Pt is medically unstable and not appropriate to participate in OT. Will attempt again at a later time/date as schedule permits following further examination by neuro and MRI results. No treatment provided.     Yolanda Pineda MS, OTR/L  02/12/2017

## 2017-02-12 NOTE — PROGRESS NOTES
TRANSFER - IN REPORT:    Verbal report received from Estelle(name) on Tiana Denny  being received from [facility]) for routine progression of care      Report consisted of patients Situation, Background, Assessment and   Recommendations(SBAR). Information from the following report(s) ED Summary was reviewed with the receiving nurse. Opportunity for questions and clarification was provided. Assessment completed upon patients arrival to unit and care assumed. Admitted awake, alert, oriented x4. No skin breakdown. Has blue discoloration under nail of left thumb. Oriented to room. Placed on telemetry in NSR with rate 88. Has limited ROM of right arm at shoulder joint. Right  slightly less strong than left  . All fingers have less than 3 second capillary refill. Slight drop in bp when standing. No dizzyness/CP/SOB.

## 2017-02-12 NOTE — PROGRESS NOTES
Hospitalist Progress Note    2017  Admit Date: 2017  6:25 PM   NAME: Nikole Magaña   :  1950   MRN:  [id number]   Attending: Catalino Bahena MD  PCP:  Reba Cervantes MD    SUBJECTIVE:   As previously documented:  'Patient is a 77 y.o. male who presents to the ER with several unusual episodes. He describes having headache and neck pain that is chronic and mild over the last few weeks. Today, he had an episode of severe neck pain, looked very pale, diaphoretic and nearly passed out. He had n/v as well. Thought he might have a stomach flu and laid down much of the day. Whenever he got up, would feel dizzy again. He eventually did agree with his wife to come to ER. She was driving him in when he had his worse episode. She called EMS and they met her on the road. They report that pt had no discernable BP, HR of32 and resp 10. I believe they gave him atropine, O2. On arrival to ER, VS were much better, pt could not move arms or legs. Could squeeze hands. In the ER, he has had a couple more episodes of severe neck pain-milvia shoulder pain followed by inability to lift or move arms. It will last a variable amount of time - minutes, then resolve again. He does have a cardiac history; denies CP, SOB. He has never had a stroke or neurological problem in past.'    17- he is seen with his wife at bedside. Complaining of arm weakness. Leg strength has improved more so. He denies any new concerns. Seen by Dr. Frantz Wheeler, neurologist, and he is recommending MRI of neck to evaluate for neural impingement. CTA head/neck results pending.       Review of Systems negative with exception of pertinent positives noted above  PHYSICAL EXAM     Visit Vitals    BP 96/68 (BP 1 Location: Left arm, BP Patient Position: Standing)    Pulse 91    Temp 98.3 °F (36.8 °C)    Resp 18    Ht 6' 1\" (1.854 m)    Wt 86.8 kg (191 lb 6.4 oz)    SpO2 94%    BMI 25.25 kg/m2      Temp (24hrs), Av °F (36.7 °C), Min:97.4 °F (36.3 °C), Max:98.4 °F (36.9 °C)    Oxygen Therapy  O2 Sat (%): 94 % (02/12/17 0802)  Pulse via Oximetry: 78 beats per minute (02/11/17 2120)  O2 Device: Nasal cannula (02/11/17 2120)  O2 Flow Rate (L/min): 2 l/min (02/11/17 2120)    Intake/Output Summary (Last 24 hours) at 02/12/17 1111  Last data filed at 02/12/17 0800   Gross per 24 hour   Intake                0 ml   Output              600 ml   Net             -600 ml      General: No acute distress    Lungs:  CTA Bilaterally. Heart:  Regular rate and rhythm,  No murmur, rub, or gallop  Abdomen: Soft, Non distended, Non tender, Positive bowel sounds  Extremities: Arm weakness noted with abduction of arms, moving legs okay, no leg edema  Neurologic:  + arm weakness    ASSESSMENT      Active Hospital Problems    Diagnosis Date Noted    Syncope 02/11/2017    Uncontrolled type 2 diabetes mellitus with hyperglycemia, without long-term current use of insulin ([facility] [address] 01/03/2017    Essential hypertension 08/07/2015    Tobacco use disorder 05/29/2014    Chronic kidney disease, stage II (mild) 05/29/2014     Plan:  · Extremity weakness- Await imaging results. Appreciate neurology input. · Syncopal event/orthostatic hypotension- possible neurogenic cause. If imaging negative, will check echo. · Continue other medications. DVT Prophylaxis: Lovenox    Signed By: Yissel Abernathy.  Rema Valenzuela MD     February 12, 2017

## 2017-02-12 NOTE — ED NOTES
TRANSFER - OUT REPORT:    Verbal report given to Jessica Dangelo RN on Barbara Johnston  being transferred to [place] for routine progression of care       Report consisted of patients Situation, Background, Assessment and   Recommendations(SBAR). Information from the following report(s) SBAR was reviewed with the receiving nurse.     Lines:   Peripheral IV 02/11/17 Left Forearm (Active)       Peripheral IV 02/11/17 Right Antecubital (Active)

## 2017-02-12 NOTE — ED PROVIDER NOTES
HPI Comments: Presents with EMS for slow heart rate. Wife reports she was bringing patient to the hospital because he had been feeling weak all day with some near syncope diaphoresis and nausea and vomiting and then complains of pain in the back of his neck and head and into her shoulders. EMS [place] the patient [place] side of [place] found the patient is diffusely weak with no blood pressure respiratory rate of 10 and a heart rate of 32 cold and clammy and unable to lift his arms or move his legs bilaterally. They gave him a dose of atropine and he improved somewhat. Upon arrival he is complaining of pain in the back of his neck and head and into his shoulders. he can squeeze my hands and move his feet. He cannot lift his arms or legs. This is nothing like the pain that he had when he had an MI. He denies having any chest pain during the course of the day. Reports that he thought he might be coming down with a stomach bug is every time he would stand up he would feel dizzy and threw up. He thought he was going to pass out and laid back down all day. This evening he got significantly worse to the side come to the hospital.    Patient is a 77 y.o. male presenting with sinus bradycardia. The history is provided by the patient. Slow Heart Rate    This is a new problem. The current episode started 6 to 12 hours ago. The problem has been gradually worsening. The problem occurs constantly. The pain is associated with stress. The patient is experiencing no pain. Associated symptoms include back pain, diaphoresis, dizziness, headaches, nausea, near-syncope, numbness, shortness of breath, vomiting and weakness. Pertinent negatives include no abdominal pain, no exertional chest pressure, no fever, no leg pain and no lower extremity edema. Risk factors include cardiac disease and male gender.         Past Medical History:   Diagnosis Date    Acute myocardial infarction, subendocardial infarction, initial episode of care [facility]) 5/29/2014     NSTEMI    CAD (coronary artery disease)     Chronic kidney disease, stage II (mild) 5/29/2014    Hyperglycemia 5/29/2014    Tobacco use disorder 5/29/2014       Past Surgical History:   Procedure Laterality Date    Hx orthopaedic       bilateral shoulder    Pr left heart cath,percutaneous  5/29/2014     xience to mRCA    Hx colonoscopy  10/16/15     Dr. Remy Rubin; colon polyp; repeat 5 yrs    Hx endoscopy  10/16/15     Dr. Remy Rubin; Grade A esophagitis         Family History:   Problem Relation Age of Onset    Cancer Mother 72     breast    Psychiatric Disorder Mother      Alzheimers    Heart Disease Father     Heart Disease Sister     Heart Disease Brother 64     5 bypass at one time    COPD Brother     Heart Disease Sister     No Known Problems Sister        Social History     Social History    Marital status:      Spouse name: N/A    Number of children: N/A    Years of education: N/A     Occupational History    Not on file. Social History Main Topics    Smoking status: Former Smoker     Packs/day: 1.00     Years: 40.00     Quit date: 5/28/2014    Smokeless tobacco: Never Used    Alcohol use No    Drug use: No    Sexual activity: Not on file     Other Topics Concern    Not on file     Social History Narrative         ALLERGIES: Review of patient's allergies indicates no known allergies. Review of Systems   Constitutional: Positive for diaphoresis. Negative for chills and fever. Respiratory: Positive for shortness of breath. Cardiovascular: Positive for near-syncope. Gastrointestinal: Positive for nausea and vomiting. Negative for abdominal pain. Musculoskeletal: Positive for back pain. Skin: Negative for rash and wound. Neurological: Positive for dizziness, weakness, numbness and headaches. All other systems reviewed and are negative.       Vitals:    02/11/17 1852 02/11/17 1910 02/11/17 1917 02/11/17 1920   BP: 146/83 (!) 202/109 163/86 169/86   Pulse:  86 80 87   Resp: 18   22   Temp:       SpO2: 97% 99% 98% 99%   Weight: 89.8 kg (198 lb)      Height: 6' 1\" (1.854 m)               Physical Exam   Constitutional: He is oriented to person, place, and time. He appears well-developed and well-nourished. He appears distressed. HENT:   Head: Normocephalic and atraumatic. Eyes: Conjunctivae and EOM are normal. Pupils are equal, round, and reactive to light. Neck: Normal range of motion. Neck supple. Cardiovascular: Normal rate and regular rhythm. Pulmonary/Chest: Effort normal and breath sounds normal. No respiratory distress. He has no wheezes. He has no rales. Abdominal: Soft. He exhibits no distension. There is no tenderness. There is no rebound. Musculoskeletal: He exhibits no edema or deformity. Neurological: He is alert and oriented to person, place, and time. No cranial nerve deficit. Unable to hold up arms or legs bilaterally equal  strength normal dorsiflexion   Skin: Skin is warm. No rash noted. He is diaphoretic. No erythema. Nursing note and vitals reviewed. MDM  Number of Diagnoses or Management Options  Bradycardia:   Dizziness:   Paresthesias/numbness:   Syncope and collapse:   Weakness of both lower extremities:   Weakness of both upper extremities:   Diagnosis management comments: From the EKG that EMS brought it appears he had sinus bradycardia. He was sent immediately for CT head-neg. His weakness improved without any intervention and he was moving his arms and legs but he then developed neck pain again in his right arm went numb and he was unable to move it. He was given pain medicine and that improved. CTA neg for dissection or SAH/aneurysm prelim read. Pt doing well after. EKG here looks essentially unchanged. Pt needs MRI and cards consult.   Unclear how his constellation of symptoms are connected -?vertigo vs cerebellar CVA vs cardiac disease with vagal vs  Intermittent block causing bradycardia. D/w hospitalist as all work up neg. Admit. D/w family and pt at length. Amount and/or Complexity of Data Reviewed  Clinical lab tests: ordered and reviewed  Decide to obtain previous medical records or to obtain history from someone other than the patient: yes (EMS)  Obtain history from someone other than the patient: yes (wife)  Review and summarize past medical records: yes (Admitted 2014 NSTEMI 1 stent.   No other visits)  Discuss the patient with other providers: yes  Independent visualization of images, tracings, or specimens: yes (NSR, no ST elevation, nonspecific ST/T wave changes III only change from 2014.)    Risk of Complications, Morbidity, and/or Mortality  Presenting problems: high  Diagnostic procedures: moderate  Management options: high    Patient Progress  Patient progress: improved    ED Course       Procedures

## 2017-02-12 NOTE — PROGRESS NOTES
END OF SHIFT NOTE:    INTAKE/OUTPUT     Voiding: YES  Catheter: NO  Drain:              Flatus: Patient does have flatus present. Stool:  0 occurrences. Characteristics:       Emesis: 0 occurrences. Characteristics:        VITAL SIGNS  Patient Vitals for the past 12 hrs:   Temp Pulse Resp BP SpO2   02/11/17 2353 - 100 - 123/77 -   02/11/17 2346 - 100 - 118/75 -   02/11/17 2320 - 88 - 148/67 -   02/11/17 2314 - 93 - 138/72 -   02/11/17 2246 - 90 - 131/65 -   02/11/17 2245 - 87 - 130/69 -   02/11/17 2200 98.2 °F (36.8 °C) 80 18 134/80 92 %   02/11/17 2120 97.8 °F (36.6 °C) 80 17 134/71 97 %   02/11/17 2115 - 75 12 - 98 %   02/11/17 2100 - 75 21 - 98 %   02/11/17 2046 - - - 142/76 -   02/11/17 2010 - 70 16 136/75 97 %   02/11/17 2000 - 66 15 126/76 96 %   02/1950 - 73 11 116/67 95 %   02/11/17 1940 - 80 19 126/74 95 %   02/11/17 1930 - 81 22 133/74 95 %   02/11/17 1920 - 87 22 169/86 99 %   02/11/17 1917 - 80 - 163/86 98 %   02/11/17 1910 - 86 - (!) 202/109 99 %   02/11/17 1852 - - 18 146/83 97 %   02/11/17 1830 97.4 °F (36.3 °C) 85 28 159/90 92 %   02/11/17 1827 - 84 20 138/87 -       Pain Assessment  Pain Intensity 1: 3 (02/11/17 2120)        Patient Stated Pain Goal: 0    Ambulating  Yes in room. Took Tylenol for shoulder pain earlier and was able to sleep intermittently. Remains in  NSR,    Shift report given to oncoming nurse at the bedside.     Fatou Mendez RN

## 2017-02-13 LAB
GLUCOSE BLD STRIP.AUTO-MCNC: 138 MG/DL (ref 65–100)
GLUCOSE BLD STRIP.AUTO-MCNC: 205 MG/DL (ref 65–100)
GLUCOSE BLD STRIP.AUTO-MCNC: 217 MG/DL (ref 65–100)
GLUCOSE BLD STRIP.AUTO-MCNC: 237 MG/DL (ref 65–100)

## 2017-02-13 PROCEDURE — 65270000029 HC RM PRIVATE

## 2017-02-13 PROCEDURE — 74011636637 HC RX REV CODE- 636/637: Performed by: INTERNAL MEDICINE

## 2017-02-13 PROCEDURE — 74011636637 HC RX REV CODE- 636/637: Performed by: FAMILY MEDICINE

## 2017-02-13 PROCEDURE — 93306 TTE W/DOPPLER COMPLETE: CPT

## 2017-02-13 PROCEDURE — 74011250636 HC RX REV CODE- 250/636: Performed by: FAMILY MEDICINE

## 2017-02-13 PROCEDURE — 82962 GLUCOSE BLOOD TEST: CPT

## 2017-02-13 PROCEDURE — 74011250637 HC RX REV CODE- 250/637: Performed by: FAMILY MEDICINE

## 2017-02-13 RX ORDER — INSULIN LISPRO 100 [IU]/ML
INJECTION, SOLUTION INTRAVENOUS; SUBCUTANEOUS
Status: DISCONTINUED | OUTPATIENT
Start: 2017-02-13 | End: 2017-02-14 | Stop reason: HOSPADM

## 2017-02-13 RX ADMIN — LOSARTAN POTASSIUM 25 MG: 25 TABLET, FILM COATED ORAL at 08:56

## 2017-02-13 RX ADMIN — Medication 5 ML: at 13:24

## 2017-02-13 RX ADMIN — Medication 10 ML: at 22:00

## 2017-02-13 RX ADMIN — Medication 5 ML: at 06:40

## 2017-02-13 RX ADMIN — ASPIRIN 81 MG: 81 TABLET, COATED ORAL at 08:47

## 2017-02-13 RX ADMIN — ATORVASTATIN CALCIUM 40 MG: 40 TABLET, FILM COATED ORAL at 22:17

## 2017-02-13 RX ADMIN — PANTOPRAZOLE SODIUM 40 MG: 40 TABLET, DELAYED RELEASE ORAL at 08:47

## 2017-02-13 RX ADMIN — INSULIN LISPRO 6 UNITS: 100 INJECTION, SOLUTION INTRAVENOUS; SUBCUTANEOUS at 22:16

## 2017-02-13 RX ADMIN — ACETAMINOPHEN 650 MG: 325 TABLET, FILM COATED ORAL at 22:17

## 2017-02-13 RX ADMIN — INSULIN HUMAN 4 UNITS: 100 INJECTION, SOLUTION PARENTERAL at 08:49

## 2017-02-13 RX ADMIN — INSULIN HUMAN 4 UNITS: 100 INJECTION, SOLUTION PARENTERAL at 13:21

## 2017-02-13 RX ADMIN — ENOXAPARIN SODIUM 40 MG: 40 INJECTION SUBCUTANEOUS at 08:48

## 2017-02-13 NOTE — CONSULTS
[address]       Name:  Ambreen Silva   MR#:  [id number]   :  1950   Account #:  [de-identified]   Date of Adm:  2017       HISTORY OF PRESENT ILLNESS: A 59-year-old gentleman who was   admitted from the emergency room yesterday after an episode of   quadriparesis mustared in by 2 attacks of spinning vertiginous   sensations. The patient really began to notice symptoms in the   posterior cervical region several weeks ago; he thought he slept   funny. He had C7 region discomfort, although at that point did   not give history of radicular symptoms with neck movements,   coughing, sneezing. He noted no weakness, change in gait or   balance. He has noticed some issues with urinary urgency for the   past 2 months, not had involuntary movements of his legs. On the   day of admission, he got up out of bed and had what he describes   as horizontal spinning and vertigo. He became quite pale and   sick to his stomach, immediately got back down in bed, rested   for a while, did not notice any other neurologic symptoms at   that time, although we did have an intensification of his neck   pain. He spent the majority of the day resting. Later that   afternoon grandchildren arrived to visit and he had gotten up to   go out, once again became extremely lightheaded, had some   dizziness, felt as though he needed to sit down before he would   fall down. Went back and began to rest, did not feel better and   began to notice numbness in his hands and weakness in his arms. His speech was slurred, but he did not have diplopia,   oscillopsia, alterations in facial or mouth sensation. EMS was   then contacted and agreed to meet with family en route to the   hospital. In the car, patient developed quadriparesis. He was   able to speak, but was dysarthric, could not move his upper or   lower extremities at all, and remembers a tingling numbness in   his hands and shoulders.  When EMS made contact, he was   bradycardic and fairly low blood pressure, was taken to the   hospital and he improved, although he has had persistent   weakness in his shoulders. He is slightly orthostatic according   to reports from the nursing staff. He has had entirely normal   mental status. He has minimal headache at this point. Once again,   has some mild tenderness at C7. Has not experienced anything   suggestive of Lhermitte's. He feels that his leg strength is back   to baseline. He is not aware of any sensory symptoms other than   some slight numbness in his hands. PAST MEDICAL HISTORY: Remarkable for coronary artery disease,   chronic kidney disease, stage II, hyperglycemia, he was recently   diagnosed with type 2 diabetes mellitus. Wife reports that he has   complained of some neuropathic pain in his lower extremities. He   has had bilateral shoulder surgeries. SOCIAL HISTORY: Continues to use tobacco to the extent of a pack a   day. OUTPATIENT MEDICATIONS    1. Omeprazole. 2. Nitroglycerin. 3. Metformin. 4. Celebrex. 5. Atorvastatin. PHYSICAL EXAMINATION: The patient is alert and oriented. Attention and memory normal. Visual fields full. Ocular motility   normal. Facial sensation and strength intact. Hearing is intact. Lower cranial nerves normal.    MOTOR: The patient has mild weakness of the deltoids, external   rotators of the humerus, triceps, wrist extensors, finger   abductors, biceps, and brachioradialis intact. The patient has   5/5 strength in the lower extremities. He has slightly   diminished temperature perception in the lower extremities. Vibration position intact. Temperature is perceived symmetrically   in the hands. There is a diminution in the intensity of cold   over the shoulder girdle region versus the upper chest. Deep   tendon reflexes are absent at the ankles, very brisk at the   knees, has a crossed adductor on the right.  The triceps are 2+,   biceps 2+, finger flexor jerks trace. Jaw jerk is normal.    IMPRESSION: Major concern would be a couple of things. The entire   symptom complex would suggest an issue with the posterior   circulation, perhaps spinal cord ischemia, and distal brainstem   dysfunction with dysarthria and vertigo. The patient has   reportedly had a CTA of the head and neck. Report is not available   at this time. He has fixed signs at the moment of upper cervical   cord process at C4-5. I think he should have magnetic resonance   imaging on an emergent basis. Will obtain that, decide on   additional measures after that is reviewed. MD JUNE Lunsford / YADIRA   D:  02/12/2017   10:04   T:  02/12/2017   10:49   Job #:  [id number]

## 2017-02-13 NOTE — PROGRESS NOTES
END OF SHIFT NOTE:    INTAKE/OUTPUT  02/12 0701 - 02/13 0700  In: 500 [I.V.:500]  Out: 1550 [Urine:1550]  Voiding: YES  Catheter: NO  Drain:              Flatus: Patient does have flatus present. Stool:  0 occurrences. Characteristics:       Emesis: 0 occurrences. Characteristics:        VITAL SIGNS  Patient Vitals for the past 12 hrs:   Temp Pulse Resp BP SpO2   02/12/17 2316 - 89 18 (!) 144/93 93 %   02/12/17 2315 - (!) 102 18 122/67 94 %   02/12/17 2314 98.2 °F (36.8 °C) 81 18 142/77 92 %   02/12/17 2000 - 69 18 127/71 95 %   02/12/17 1901 - 95 18 126/70 93 %   02/12/17 1900 98.3 °F (36.8 °C) 69 18 124/69 95 %       Pain Assessment  Pain Intensity 1: 2 (02/12/17 1715)  Pain Location 1: Neck  Pain Intervention(s) 1: Medication (see MAR)  Patient Stated Pain Goal: 0    Ambulating  Not oob this shit.  equal and strong tonight-much better than yesterday. No numbness in legs. Shift report given to oncoming nurse at the bedside.     Jayne Muro RN

## 2017-02-13 NOTE — PROGRESS NOTES
Patient informed that he couldn't have glucophage restarted because he will have mri with contrast tomorrow and he would have to wait 48 hours for kidney clearance. Also informed that he had stronger pain pill available if he needed it for his neck pain.

## 2017-02-13 NOTE — PROGRESS NOTES
Neurology: no recurrent symptoms left  Upper extremity stronger right about the same no recurrent leg weakness.  Reviewed imaging with radiology pt clearly has proximal right vertebral occlusion possibly acute and therefore a potential source of further ischemic events related to clot probagation distal embolization discussed with Dr Anai Atkins discussion with neurointerventionalist

## 2017-02-13 NOTE — PROGRESS NOTES
PT orders received and chart review initiated. Per NS \"He has multilevel cervical spinal stenosis which could potentially be the culprit in is transient quadraparesis. I would also question the possibility of a cord ischemic event given the history at hand. \"  Neurosurgery has been consulted. Will hold PT until okayed by NS, discussed with RN and OTR.   DEIRDRE Verma Listen, PT

## 2017-02-13 NOTE — PROGRESS NOTES
OCCUPATIONAL THERAPY NOTE: Held assessment/treatment today secondary with plans to hold until neurosurgery and/or neurology sees secondary to quadriparesis and in light of MRI cervical spine. Plan to hold until cleared by neurosurgery and/or neurology.

## 2017-02-13 NOTE — PROGRESS NOTES
Hospitalist Progress Note    2017  Admit Date: 2017  6:25 PM   NAME: Tone Nance   :  1950   MRN:  [id number]   Attending: Landry Marshall. Zayda Kirkland MD  PCP:  Lynda Doll MD    SUBJECTIVE:    As previously documented - 77 y.o. male who presents to the ER with several unusual episodes. He describes having headache and neck pain that is chronic and mild over the last few weeks. Day of arrival had episode of severe neck pain, looked very pale, diaphoretic and nearly passed out. He had n/v as well. Whenever he got up, would feel dizzy again. He eventually did agree with his wife to come to ER. She was driving him in when he had his worse episode. She called EMS and they met her on the road. They report that pt had no discernable BP, HR of32 and resp 10. I believe they gave him atropine, O2. On arrival to ER, VS were much better, pt could not move arms or legs. Could squeeze hands. In the ER, he has had a couple more episodes of severe neck pain-milvia shoulder pain followed by inability to lift or move arms. It will last a variable amount of time - minutes, then resolve again. He does have a cardiac history; denies CP, SOB. He has never had a stroke or neurological problem in past.'  CTA head/neck - MRI brain/cervical spine as below - right cerebellar CVA, Right Vert artery occlusion, Multilevel cervical canal stenosis and ?cervical spine infarct. CTA head/neck  IMPRESSION: Limited study extending from above the aortic arch to the midportion  of the brain.     The right carotid shows no significant stenosis. The left carotid artery shows a  60-65% diameter stenosis.     There is occlusion of the right vertebral artery.     MRI cervical Spine  IMPRESSION:  1. Multilevel acquired central canal stenoses at C4-5, C5-6, and C6-7. No  abnormal cord signal changes are seen at these levels, however.      2.  Abnormal signal seen within the left central cord at C3-4 which occurs  directly adjacent to a focal left paracentral disc osteophyte complex. This may  represent chronic myelomalacia given the adjacent osteophyte and lack of recent  trauma. However, this is incompletely characterized. Additional differential  considerations include demyelination, myelitis, or infarction. Recommend  correlation with neurological examination. This would be best further assessed  with a contrasted MRI study.     3. Narrowing of the right C5, and left C6 neural foramen from uncovertebral  joint hypertrophic changes at these levels.     These findings and recommendations were discussed with Lynn, the nurse caring  for the patient by myself personally at 2:43 pm on 2017. MRI brain  IMPRESSION:   1. Focus of diffusion restriction in the right cerebellar hemisphere without  enhancement suggesting a subacute infarction at this level. Review of Systems negative with exception of pertinent positives noted above  PHYSICAL EXAM     Visit Vitals    /69    Pulse 88    Temp 96.7 °F (35.9 °C)    Resp 18    Ht 6' 1\" (1.854 m)    Wt 86.8 kg (191 lb 6.4 oz)    SpO2 96%    BMI 25.25 kg/m2      Temp (24hrs), Av.6 °F (36.4 °C), Min:96.7 °F (35.9 °C), Max:98.3 °F (36.8 °C)    Oxygen Therapy  O2 Sat (%): 96 % (17 110)  Pulse via Oximetry: 78 beats per minute (17)  O2 Device: Room air (17 0845)  O2 Flow Rate (L/min): 2 l/min (17)    Intake/Output Summary (Last 24 hours) at 17 1535  Last data filed at 17 1105   Gross per 24 hour   Intake              500 ml   Output             1900 ml   Net            -1400 ml      General: No acute distress    Lungs:  CTA Bilaterally.    Heart:  Regular rate and rhythm,  No murmur, rub, or gallop  Abdomen: Soft, Non distended, Non tender, Positive bowel sounds  Extremities: No cyanosis, clubbing or edema  Neurologic:  RLE 4/5 motor,     ASSESSMENT      Active Hospital Problems    Diagnosis Date Noted    Weakness of both arms 2017  Syncope 02/11/2017    Uncontrolled type 2 diabetes mellitus with hyperglycemia, without long-term current use of insulin ([facility] [address] 01/03/2017    Essential hypertension 08/07/2015    Tobacco use disorder 05/29/2014    Chronic kidney disease, stage II (mild) 05/29/2014     A/P  - Intermittent Quadraparesis - improving. Noted mild motor loss in RLE today. PT eval on hold until neurosx consults per their request. ?2/2 multilevel cervical canal stenosis vs ?cord infarct. 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 Cont asa. PT/OT when ready  - DM2 - HA1C 8. Continue to hold metformin. Increase coverage with SSI.    - CKD - baseline  - HTN - controlled, cont cozaar    DVT Prophylaxis: lovenox sq    Signed By: Long Arnold DO     February 13, 2017

## 2017-02-13 NOTE — PROGRESS NOTES
Spoke to Mr. Magaña briefly this morning in room 206 about discharge planning. His plan is home with wife, two steps up to the first floor of his home, with a 2nd floor and furnished basement. Await further input from neurosurgery, and then OT/PT when safe to evaluate. Will order ppd just in case he needs STR at a SNF. Re-evaluate discharge planning later.

## 2017-02-14 VITALS
HEART RATE: 76 BPM | DIASTOLIC BLOOD PRESSURE: 47 MMHG | SYSTOLIC BLOOD PRESSURE: 118 MMHG | BODY MASS INDEX: 25.37 KG/M2 | WEIGHT: 191.4 LBS | RESPIRATION RATE: 16 BRPM | HEIGHT: 73 IN | OXYGEN SATURATION: 98 % | TEMPERATURE: 98 F

## 2017-02-14 LAB
ANION GAP BLD CALC-SCNC: 10 MMOL/L (ref 7–16)
BUN SERPL-MCNC: 19 MG/DL (ref 8–23)
CALCIUM SERPL-MCNC: 9.1 MG/DL (ref 8.3–10.4)
CHLORIDE SERPL-SCNC: 105 MMOL/L (ref 98–107)
CO2 SERPL-SCNC: 27 MMOL/L (ref 21–32)
CREAT SERPL-MCNC: 1.08 MG/DL (ref 0.8–1.5)
ERYTHROCYTE [DISTWIDTH] IN BLOOD BY AUTOMATED COUNT: 12.3 % (ref 11.9–14.6)
GLUCOSE BLD STRIP.AUTO-MCNC: 160 MG/DL (ref 65–100)
GLUCOSE BLD STRIP.AUTO-MCNC: 208 MG/DL (ref 65–100)
GLUCOSE SERPL-MCNC: 204 MG/DL (ref 65–100)
HCT VFR BLD AUTO: 45 % (ref 41.1–50.3)
HGB BLD-MCNC: 15.7 G/DL (ref 13.6–17.2)
MCH RBC QN AUTO: 34.1 PG (ref 26.1–32.9)
MCHC RBC AUTO-ENTMCNC: 34.9 G/DL (ref 31.4–35)
MCV RBC AUTO: 97.6 FL (ref 79.6–97.8)
PLATELET # BLD AUTO: 303 K/UL (ref 150–450)
PMV BLD AUTO: 9.5 FL (ref 10.8–14.1)
POTASSIUM SERPL-SCNC: 3.9 MMOL/L (ref 3.5–5.1)
RBC # BLD AUTO: 4.61 M/UL (ref 4.23–5.67)
SODIUM SERPL-SCNC: 142 MMOL/L (ref 136–145)
T3FREE SERPL-MCNC: 3.1 PG/ML (ref 2–4.4)
WBC # BLD AUTO: 10.8 K/UL (ref 4.3–11.1)

## 2017-02-14 PROCEDURE — 74011250637 HC RX REV CODE- 250/637: Performed by: NEUROLOGICAL SURGERY

## 2017-02-14 PROCEDURE — 36415 COLL VENOUS BLD VENIPUNCTURE: CPT | Performed by: INTERNAL MEDICINE

## 2017-02-14 PROCEDURE — 82962 GLUCOSE BLOOD TEST: CPT

## 2017-02-14 PROCEDURE — 74011636637 HC RX REV CODE- 636/637: Performed by: INTERNAL MEDICINE

## 2017-02-14 PROCEDURE — 97165 OT EVAL LOW COMPLEX 30 MIN: CPT

## 2017-02-14 PROCEDURE — 74011250636 HC RX REV CODE- 250/636: Performed by: FAMILY MEDICINE

## 2017-02-14 PROCEDURE — 80048 BASIC METABOLIC PNL TOTAL CA: CPT | Performed by: INTERNAL MEDICINE

## 2017-02-14 PROCEDURE — 74011250637 HC RX REV CODE- 250/637: Performed by: FAMILY MEDICINE

## 2017-02-14 PROCEDURE — 85027 COMPLETE CBC AUTOMATED: CPT | Performed by: INTERNAL MEDICINE

## 2017-02-14 PROCEDURE — 97161 PT EVAL LOW COMPLEX 20 MIN: CPT

## 2017-02-14 RX ORDER — ASPIRIN 325 MG
325 TABLET ORAL DAILY
Qty: 30 TAB | Refills: 0 | Status: SHIPPED | OUTPATIENT
Start: 2017-02-15 | End: 2020-10-15 | Stop reason: CLARIF

## 2017-02-14 RX ORDER — ASPIRIN 325 MG
650 TABLET ORAL ONCE
Status: COMPLETED | OUTPATIENT
Start: 2017-02-14 | End: 2017-02-14

## 2017-02-14 RX ORDER — ASPIRIN 325 MG
325 TABLET ORAL DAILY
Status: DISCONTINUED | OUTPATIENT
Start: 2017-02-15 | End: 2017-02-14 | Stop reason: HOSPADM

## 2017-02-14 RX ADMIN — ASPIRIN 325 MG ORAL TABLET 650 MG: 325 PILL ORAL at 11:43

## 2017-02-14 RX ADMIN — ASPIRIN 81 MG: 81 TABLET, COATED ORAL at 08:14

## 2017-02-14 RX ADMIN — Medication 10 ML: at 06:00

## 2017-02-14 RX ADMIN — PANTOPRAZOLE SODIUM 40 MG: 40 TABLET, DELAYED RELEASE ORAL at 08:13

## 2017-02-14 RX ADMIN — ENOXAPARIN SODIUM 40 MG: 40 INJECTION SUBCUTANEOUS at 08:14

## 2017-02-14 RX ADMIN — INSULIN LISPRO 3 UNITS: 100 INJECTION, SOLUTION INTRAVENOUS; SUBCUTANEOUS at 11:42

## 2017-02-14 RX ADMIN — LOSARTAN POTASSIUM 25 MG: 25 TABLET, FILM COATED ORAL at 08:13

## 2017-02-14 RX ADMIN — INSULIN LISPRO 6 UNITS: 100 INJECTION, SOLUTION INTRAVENOUS; SUBCUTANEOUS at 08:14

## 2017-02-14 NOTE — PROGRESS NOTES
Discharge instructions and prescriptions given and reviewed with pt, verbalizes understanding, medication side effect sheet reviewed with pt, pt discharged home with family. Patient given a diabetic diet education sheet to take home.

## 2017-02-14 NOTE — PROGRESS NOTES
Problem: Mobility Impaired (Adult and Pediatric)  Goal: *Acute Goals and Plan of Care (Insert Text)  No goals indicated. Pt is functioning at baseline. PHYSICAL THERAPY: INITIAL ASSESSMENT, DISCHARGE 2/14/2017  INPATIENT: Hospital Day: 4  [place] / [place] / Product Type: Medicare /      NAME/AGE/GENDER: Steph Trejo is a 77 y.o. male      PRIMARY DIAGNOSIS: Syncope Syncope Syncope        ICD-10: Treatment Diagnosis:       · Generalized Muscle Weakness (M62.81)  · Other lack of cordination (R27.8)  · Difficulty in walking, Not elsewhere classified (R26.2)   Precaution/Allergies:  Review of patient's allergies indicates no known allergies. ASSESSMENT:   Spoke in person to Dr. Alexander Graham prior to initiation of physical therapy evaluation. Dr. Alexander Graham ordered that pt is \"activity as tolerated\" with \"no restrictions. \" RN Kortney Alfonso) concurred and ok'd treatment. Mr. Mecca Sierra presents supine in bed, wife at bedside. Pt alert and cooperative. Pt admitted with transient quadriplegia due to vertebral artery occlusion. Currently, pt presents with no deficits in strength, coordination, sensation or tone. Static and dynamic sitting/standing balance are all good. Pt ambulated 500' independently and ascended/descended 10 steps independently. No gait deficits noted. Pt reports that he has returned to his baseline with only minimal decrease in strength in his right hand. Pt has no further physical therapy needs and is expected to return home with wife. This section established at most recent assessment   PROBLEM LIST (Impairments causing functional limitations):   1. none    INTERVENTIONS PLANNED: (Benefits and precautions of physical therapy have been discussed with the patient.)  1. none      TREATMENT PLAN: Frequency/Duration: discharge PT  Rehabilitation Potential For Stated Goals: NO GOALS INDICATED      RECOMMENDED REHABILITATION/EQUIPMENT: (at time of discharge pending progress): None.                   HISTORY:   History of Present Injury/Illness (Reason for Referral):  Per chart: Steph Trejo is a 77 y.o.  right handed male who presents S/P an episode of transient quadriplegia. This began with vertigo-type dizziness that came on when he got up from a seated position. He vomited with this and then got better with lying supine. This recurred a little later when he got up again. He then went on to have RUE weakness that gradually progressed to total quadriplegia. He did have sensory loss with this as well as motor loss. He reports hearing and understanding people speaking, but he could not speak back. He got some words out, but they were very slurred. He never lost consciousness and had no seizure activity. He did have some HA, but this was not severe. He remembers hearing from EMS that his heart rate was 30. He was admitted and gradually experienced the return of good neurologic function to almost normal over the next few hours. At the time of my examination on 2/13, 1845, he had some residual weakness in the proximal BUEs. Past Medical History/Comorbidities:   Mr. Mecca Sierra  has a past medical history of Acute myocardial infarction, subendocardial infarction, initial episode of care [facility]) (5/29/2014); CAD (coronary artery disease); Chronic kidney disease, stage II (mild) (5/29/2014); Hyperglycemia (5/29/2014); and Tobacco use disorder (5/29/2014). Mr. Mecca Sierra  has a past surgical history that includes orthopaedic; left heart cath,percutaneous (5/29/2014); colonoscopy (10/16/15); and endoscopy (10/16/15).   Social History/Living Environment:   Home Environment: Private residence  # Steps to Enter: 2  Rails to Enter: No  Wheelchair Ramp: No  One/Two Story Residence:  (three levels, lives on main level)  Living Alone: No  Support Systems: Family member(s), Spouse/Significant Other/Partner  Patient Expects to be Discharged to[de-identified] Private residence  Current DME Used/Available at Home:  (has straight cane but does not use)  Tub or Shower Type: Shower (built in seat)  Prior Level of Function/Work/Activity:  Pt was independent with all functional mobility and gait without assistive device. Independent with ADLs. Active. 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 Drives. No falls. Lives with wife who can assist as needed. Dominant Side:         RIGHT   Number of Personal Factors/Comorbidities that affect the Plan of Care: 1-2: MODERATE COMPLEXITY   EXAMINATION:   Most Recent Physical Functioning:   Gross Assessment:  AROM: Within functional limits (B LE)  Strength: Within functional limits (B LE)  Coordination: Within functional limits (B LE)  Tone: Normal (B LE)  Sensation: Intact (B LE)               Posture:  Posture (WDL): Within defined limits  Balance:  Sitting: Intact  Standing: Intact Bed Mobility:  Rolling: Independent  Supine to Sit: Independent  Sit to Supine: Independent  Scooting: Independent  Wheelchair Mobility:     Transfers:  Sit to Stand: Independent  Stand to Sit: Independent  Bed to Chair: Independent  Gait:     Distance (ft): 500 Feet (ft)  Assistive Device:  (none)  Ambulation - Level of Assistance: Independent  Number of Stairs Trained: 10  Stairs - Level of Assistance: Independent  Rail Use: Right        Body Structures Involved:  1. Nerves  2. Muscles Body Functions Affected:  1. Neuromusculoskeletal  2. Movement Related Activities and Participation Affected:  1. Mobility   Number of elements that affect the Plan of Care: 4+: HIGH COMPLEXITY   CLINICAL PRESENTATION:   Presentation: Stable and uncomplicated: LOW COMPLEXITY   CLINICAL DECISION MAKIN [facility] [address] [id number] AM-PAC 6 Clicks   Basic Mobility Inpatient Short Form  How much difficulty does the patient currently have. .. Unable A Lot A Little None   1. Turning over in bed (including adjusting bedclothes, sheets and blankets)? [ ] 1   [ ] 2   [ ] 3   [X] 4   2.   Sitting down on and standing up from a chair with arms ( e.g., wheelchair, bedside commode, etc.)   [ ] 1   [ ] 2   [ ] 3   [X] 4   3. Moving from lying on back to sitting on the side of the bed? [ ] 1   [ ] 2   [ ] 3   [X] 4   How much help from another person does the patient currently need. .. Total A Lot A Little None   4. Moving to and from a bed to a chair (including a wheelchair)? [ ] 1   [ ] 2   [ ] 3   [X] 4   5. Need to walk in hospital room? [ ] 1   [ ] 2   [ ] 3   [X] 4   6. Climbing 3-5 steps with a railing? [ ] 1   [ ] 2   [ ] 3   [X] 4   © 2007, Trustees of [address] [place] [address] [address] [id number], under license to [employer]. All rights reserved    Score:  Initial: 24 Most Recent: X (Date: -- )     Interpretation of Tool:  Represents activities that are increasingly more difficult (i.e. Bed mobility, Transfers, Gait). Score 24 23 22-20 19-15 14-10 9-7 6       Modifier CH CI CJ CK CL CM CN         · Mobility - Walking and Moving Around:               - CURRENT STATUS:    CH - 0% impaired, limited or restricted               - GOAL STATUS:           CH - 0% impaired, limited or restricted               - D/C STATUS:                       CH - 0% impaired, limited or restricted  [place] / [place] / Product Type: Medicare /       Medical Necessity:     · none. Reason for Services/Other Comments:  · none. Use of outcome tool(s) and clinical judgement create a POC that gives a: Clear prediction of patient's progress: LOW COMPLEXITY                 TREATMENT:   (In addition to Assessment/Re-Assessment sessions the following treatments were rendered)   Pre-treatment Symptoms/Complaints:  \"When they put me in the ambulance Saturday night, I thought I was dying. \"  Pain: Initial:   Pain Intensity 1: 0  Post Session:  0/10      Assessment/Reassessment only, no treatment provided today     Braces/Orthotics/Lines/Etc:   · O2 Device: Room air  Treatment/Session Assessment:    · Response to Treatment:  No treatment provided  · Interdisciplinary Collaboration:  · Physical Therapist  · Occupational Therapist  · Registered Nurse  · Dr. Irina Joe  · After treatment position/precautions:  · Supine in bed  · Bed/Chair-wheels locked  · Bed in low position  · Call light within reach  · RN notified  · Family at bedside  · Compliance with Program/Exercises: Will assess as treatment progresses. · Recommendations/Intent for next treatment session:  Discharge PT.   Total Treatment Duration:  PT Patient Time In/Time Out  Time In: 0957  Time Out: [address], PT

## 2017-02-14 NOTE — DISCHARGE SUMMARY
Hospitalist Discharge Summary     Patient ID:  Mercedez Berry  [id number]  77 y.o.  1950  Admit date: 2/11/2017  6:25 PM  Discharge date and time: 2/14/2017  Attending: Francois Muse. Vanessa Faust MD  PCP:  Vinicio Peters MD  Treatment Team: Attending Provider: Francois Muse. Vanessa Faust MD; Consulting Provider: Keiko Morse MD; Care Manager: Gayathri Meredith RN; Consulting Provider: Mary Nguyen MD    Principal Diagnosis Syncope   Principal Problem:    Syncope (2/11/2017)    Active Problems:    Tobacco use disorder (5/29/2014)      Chronic kidney disease, stage II (mild) (5/29/2014)      Essential hypertension (8/7/2015)      Uncontrolled type 2 diabetes mellitus with hyperglycemia, without long-term current use of insulin ([address] (1/3/2017)      Weakness of both arms (2/12/2017)             Hospital Course:  Please refer to the admission H&P for details of presentation. In summary, 77 y.o. male who presents to the ER with several unusual episodes described as severe vertigo preceeded by headache and severe neck pain. Upon arrival to ED he was also witnessed to have intermittent episodes of quadraparesis. He was seen by neurology/ neurosurgery. 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 Neurosurgery and neurointerventionalist have discussed case with decision to proceed with full dose asa regimen for stroke recurrence prevention and have decided no intervention in necessary. He has had remarkable improvement in neuro deficits with only a very slight RUE weakness persistent upon discharge. He has been evaluated by PT and has been deemed a safe discharge to home. He will need follow-up with Dr Baltazar Tejada at [facility] in next 2 weeks.        CTA head/neck  IMPRESSION: Limited study extending from above the aortic arch to the midportion  of the brain.      The right carotid shows no significant stenosis.  The left carotid artery shows a  60-65% diameter stenosis.     There is occlusion of the right vertebral artery.      MRI cervical Spine  IMPRESSION:  1. Multilevel acquired central canal stenoses at C4-5, C5-6, and C6-7. No  abnormal cord signal changes are seen at these levels, however.       2. Abnormal signal seen within the left central cord at C3-4 which occurs  directly adjacent to a focal left paracentral disc osteophyte complex. This may  represent chronic myelomalacia given the adjacent osteophyte and lack of recent  trauma. However, this is incompletely characterized. Additional differential  considerations include demyelination, myelitis, or infarction. Recommend  correlation with neurological examination. This would be best further assessed  with a contrasted MRI study.      3. Narrowing of the right C5, and left C6 neural foramen from uncovertebral  joint hypertrophic changes at these levels.      These findings and recommendations were discussed with Lynn, the nurse caring  for the patient by myself personally at 2:43 pm on 2/12/2017.     MRI brain  IMPRESSION:   1. Focus of diffusion restriction in the right cerebellar hemisphere without  enhancement suggesting a subacute infarction at this level.       Labs: Results:       Chemistry Recent Labs      02/14/17 0456 02/12/17   0615  02/11/17   1837   GLU  204*  233*  184*   NA  142  140  141   K  3.9  3.7  3.6   CL  105  105  105   CO2  27  26  26   BUN  19  19  19   CREA  1.08  0.92  0.95   CA  9.1  8.8  8.5   AGAP  10  9  10   AP   --    --   87   TP   --    --   7.2   ALB   --    --   3.5   GLOB   --    --   3.7*   AGRAT   --    --   0.9*      CBC w/Diff Recent Labs      02/14/17 0456  02/12/17   0615  02/11/17   1837   WBC  10.8  9.2  11.4*   RBC  4.61  4.34  4.41   HGB  15.7  14.8  15.0   HCT  45.0  42.3  41.9   PLT  303  294  289   GRANS   --    --   75   LYMPH   --    --   17   EOS   --    --   2      Cardiac Enzymes No results for input(s): CPK, CKND1, CARMELO in the last 72 hours. No lab exists for component: CKRMB, TROIP   Coagulation Recent Labs      02/11/17 1837   PTP  11.0   INR  1.0       Lipid Panel Lab Results   Component Value Date/Time    Cholesterol, total 211 02/12/2017 06:15 AM    HDL Cholesterol 30 02/12/2017 06:15 AM    LDL, calculated 128.8 02/12/2017 06:15 AM    VLDL, calculated 52.2 02/12/2017 06:15 AM    Triglyceride 261 02/12/2017 06:15 AM    CHOL/HDL Ratio 7.0 02/12/2017 06:15 AM      BNP Recent Labs      02/11/17 1837   BNPP  22      Liver Enzymes Recent Labs      02/11/17 1837   TP  7.2   ALB  3.5   AP  87   SGOT  12*      Thyroid Studies Lab Results   Component Value Date/Time    TSH 0.577 02/12/2017 06:15 AM            Discharge Exam:  Visit Vitals    /64    Pulse 89    Temp 97.5 °F (36.4 °C)    Resp 16    Ht 6' 1\" (1.854 m)    Wt 86.8 kg (191 lb 6.4 oz)    SpO2 96%    BMI 25.25 kg/m2     General appearance: alert, cooperative, no distress, appears stated age  Lungs: clear to auscultation bilaterally  Heart: regular rate and rhythm, S1, S2 normal, no murmur, click, rub or gallop  Abdomen: soft, non-tender. Bowel sounds normal. No masses,  no organomegaly  Extremities: no cyanosis or edema  Neurologic: 4/5 motor RUE, otherwise non focal    Disposition: home  Discharge Condition: stable  Patient Instructions:   Current Discharge Medication List      START taking these medications    Details   aspirin (ASPIRIN) 325 mg tablet Take 1 Tab by mouth daily. Qty: 30 Tab, Refills: 0         CONTINUE these medications which have NOT CHANGED    Details   metFORMIN (GLUCOPHAGE) 500 mg tablet Take 1 Tab by mouth two (2) times daily (with meals). Qty: 60 Tab, Refills: 2    Associated Diagnoses: Uncontrolled type 2 diabetes mellitus with hyperglycemia, without long-term current use of insulin (HCC)      losartan (COZAAR) 25 mg tablet Take 1 Tab by mouth daily.   Qty: 30 Tab, Refills: 2    Associated Diagnoses: Essential hypertension      !! lancets (ONETOUCH DELICA LANCETS) 30 gauge misc Check blood sugars once daily. Check fasting or 2 hours after a meal. Dx:  E11.65  Qty: 100 Lancet, Refills: 3    Associated Diagnoses: Uncontrolled type 2 diabetes mellitus with hyperglycemia, without long-term current use of insulin ([address]      ! ! Lancets (ONETOUCH ULTRASOFT LANCETS) misc Check blood sugars once daily. Check fasting or 2 hours after a meal. Dx:  E11.65  Qty: 100 Each, Refills: 3    Associated Diagnoses: Glucosuria      Blood-Glucose Meter (ONETOUCH ULTRA SYSTEM KIT) monitoring kit Check blood sugars once daily. Check fasting or 2 hours after a meal. DX E11.65  Qty: 1 Kit, Refills: 0    Associated Diagnoses: Glucosuria      glucose blood VI test strips (ONETOUCH ULTRA TEST) strip Check blood sugars once daily. Check fasting or 2 hours after a meal. DX E11.65  Qty: 100 Strip, Refills: 3    Associated Diagnoses: Glucosuria      atorvastatin (LIPITOR) 40 mg tablet Take 1 Tab by mouth nightly. Qty: 30 Tab, Refills: 3    Associated Diagnoses: Mixed hyperlipidemia      omeprazole (PRILOSEC) 20 mg capsule 1 tablet daily 30 minutes before a meal  Qty: 90 Cap, Refills: 3    Associated Diagnoses: Gastroesophageal reflux disease without esophagitis      nitroglycerin (NITROSTAT) 0.4 mg SL tablet 1 Tab by SubLINGual route every five (5) minutes as needed for Chest Pain. Qty: 25 Tab, Refills: 11    Associated Diagnoses: Coronary artery disease involving native coronary artery of native heart without angina pectoris       ! ! - Potential duplicate medications found. Please discuss with provider.       STOP taking these medications       celecoxib (CELEBREX) 200 mg capsule Comments:   Reason for Stopping:         aspirin delayed-release 81 mg tablet Comments:   Reason for Stopping:               Activity: as tolerated  Diet: diabetic      Follow-up  ·   1-2 weeks with PCP, 2 weeks neurointerventionalist Dr Emi Jones at [facility]  Time spent to discharge patient 28 minutes  Signed:  Gabby Chavarria DO  2/14/2017  11:16 AM

## 2017-02-14 NOTE — DISCHARGE INSTRUCTIONS
DISCHARGE SUMMARY from Nurse    The following personal items are in your possession at time of discharge:    Dental Appliances: Uppers, With patient        Home Medications: None  Jewelry: Ring  Clothing: Footwear, Pants, Shirt  Other Valuables: None             PATIENT INSTRUCTIONS:    After general anesthesia or intravenous sedation, for 24 hours or while taking prescription Narcotics:  · Limit your activities  · Do not drive and operate hazardous machinery  · Do not make important personal or business decisions  · Do  not drink alcoholic beverages  · If you have not urinated within 8 hours after discharge, please contact your surgeon on call. Report the following to your surgeon:  · Excessive pain, swelling, redness or odor of or around the surgical area  · Temperature over 100.5  · Nausea and vomiting lasting longer than 4 hours or if unable to take medications  · Any signs of decreased circulation or nerve impairment to extremity: change in color, persistent  numbness, tingling, coldness or increase pain  · Any questions        What to do at Home:  Recommended activity: Activity as tolerated, per MD instructions    If you experience any of the following symptoms fever > 101, nausea, vomiting, pain, chest pain, shortness of breath please follow up with MD.      *  Please give a list of your current medications to your Primary Care Provider. *  Please update this list whenever your medications are discontinued, doses are      changed, or new medications (including over-the-counter products) are added. *  Please carry medication information at all times in case of emergency situations. These are general instructions for a healthy lifestyle:    No smoking/ No tobacco products/ Avoid exposure to second hand smoke    Surgeon General's Warning:  Quitting smoking now greatly reduces serious risk to your health.     Obesity, smoking, and sedentary lifestyle greatly increases your risk for illness    A healthy diet, regular physical exercise & weight monitoring are important for maintaining a healthy lifestyle    You may be retaining fluid if you have a history of heart failure or if you experience any of the following symptoms:  Weight gain of 3 pounds or more overnight or 5 pounds in a week, increased swelling in our hands or feet or shortness of breath while lying flat in bed. Please call your doctor as soon as you notice any of these symptoms; do not wait until your next office visit. Recognize signs and symptoms of STROKE:    F-face looks uneven    A-arms unable to move or move unevenly    S-speech slurred or non-existent    T-time-call 911 as soon as signs and symptoms begin-DO NOT go       Back to bed or wait to see if you get better-TIME IS BRAIN. Warning Signs of HEART ATTACK     Call 911 if you have these symptoms:   Chest discomfort. Most heart attacks involve discomfort in the center of the chest that lasts more than a few minutes, or that goes away and comes back. It can feel like uncomfortable pressure, squeezing, fullness, or pain.  Discomfort in other areas of the upper body. Symptoms can include pain or discomfort in one or both arms, the back, neck, jaw, or stomach.  Shortness of breath with or without chest discomfort.  Other signs may include breaking out in a cold sweat, nausea, or lightheadedness. Don't wait more than five minutes to call 911 - MINUTES MATTER! Fast action can save your life. Calling 911 is almost always the fastest way to get lifesaving treatment. Emergency Medical Services staff can begin treatment when they arrive -- up to an hour sooner than if someone gets to the hospital by car. The discharge information has been reviewed with the patient. The patient verbalized understanding. Discharge medications reviewed with the patient and appropriate educational materials and side effects teaching were provided.            Fainting: Care Instructions  Your Care Instructions    When you faint, or pass out, you lose consciousness for a short time. A brief drop in blood flow to the brain often causes it. When you fall or lie down, more blood flows to your brain and you regain consciousness. Emotional stress, pain, or overheating--especially if you have been standing--can make you faint. In these cases, fainting is usually not serious. But fainting can be a sign of a more serious problem. Your doctor may want you to have more tests to rule out other causes. The treatment you need depends on the reason why you fainted. The doctor has checked you carefully, but problems can develop later. If you notice any problems or new symptoms, get medical treatment right away. Follow-up care is a key part of your treatment and safety. Be sure to make and go to all appointments, and call your doctor if you are having problems. It's also a good idea to know your test results and keep a list of the medicines you take. How can you care for yourself at home? · Drink plenty of fluids to prevent dehydration. If you have kidney, heart, or liver disease and have to limit fluids, talk with your doctor before you increase your fluid intake. When should you call for help? Call 911 anytime you think you may need emergency care. For example, call if:  · You have symptoms of a heart problem. These may include:  ¨ Chest pain or pressure. ¨ Severe trouble breathing. ¨ A fast or irregular heartbeat. ¨ Lightheadedness or sudden weakness. ¨ Coughing up pink, foamy mucus. ¨ Passing out. After you call 911, the  may tell you to chew 1 adult-strength or 2 to 4 low-dose aspirin. Wait for an ambulance. Do not try to drive yourself. · You have symptoms of a stroke. These may include:  ¨ Sudden numbness, tingling, weakness, or loss of movement in your face, arm, or leg, especially on only one side of your body. ¨ Sudden vision changes. ¨ Sudden trouble speaking.   ¨ Sudden confusion or trouble understanding simple statements. ¨ Sudden problems with walking or balance. ¨ A sudden, severe headache that is different from past headaches. · You passed out (lost consciousness) again. Watch closely for changes in your health, and be sure to contact your doctor if:  · You do not get better as expected. Where can you learn more? Go to http://jose angel-dalia.info/. Enter R467 in the search box to learn more about \"Fainting: Care Instructions. \"  Current as of: May 27, 2016  Content Version: 11.1  © 5082-3895 [employer]. Care instructions adapted under license by [employer] (which disclaims liability or warranty for this information). If you have questions about a medical condition or this instruction, always ask your healthcare professional. [address] any warranty or liability for your use of this information.

## 2017-02-14 NOTE — CONSULTS
Neurosurgery Consult     Subjective:              Gregory Dwyer is a 77 y.o.  right handed male who presents S/P an episode of transient quadriplegia. This began with vertigo-type dizziness that came on when he got up from a seated position. He vomited with this and then got better with lying supine. This recurred a little later when he got up again. He then went on to have RUE weakness that gradually progressed to total quadriplegia. He did have sensory loss with this as well as motor loss. He reports hearing and understanding people speaking, but he could not speak back. He got some words out, but they were very slurred. He never lost consciousness and had no seizure activity. He did have some HA, but this was not severe. He remembers hearing from EMS that his heart rate was 30. He was admitted and gradually experienced the return of good neurologic function to almost normal over the next few hours. At the time of my examination on 2/13, 1845, he had some residual weakness in the proximal BUEs.       Patient Active Problem List    Diagnosis Date Noted    Weakness of both arms 02/12/2017    Syncope 02/11/2017    Uncontrolled type 2 diabetes mellitus with hyperglycemia, without long-term current use of insulin ([address] 01/03/2017    Essential hypertension 08/07/2015    HLD (hyperlipidemia) 08/07/2015    Coronary artery disease involving native coronary artery without angina pectoris 08/07/2015    NSTEMI (non-ST elevated myocardial infarction) ([address] 05/30/2014    MI, acute, non ST segment elevation ([address] 05/29/2014    Acute myocardial infarction, subendocardial infarction, initial episode of care ([address] 05/29/2014    Tobacco use disorder 05/29/2014    Hyperglycemia 05/29/2014    Chronic kidney disease, stage II (mild) 05/29/2014    Unstable angina ([address] 05/29/2014         Moy Oakley MD       Past Medical History   Diagnosis Date    Acute myocardial infarction, subendocardial infarction, initial episode of care [facility]) 5/29/2014     NSTEMI    CAD (coronary artery disease)     Chronic kidney disease, stage II (mild) 5/29/2014    Hyperglycemia 5/29/2014    Tobacco use disorder 5/29/2014           Past Surgical History   Procedure Laterality Date    Hx orthopaedic       bilateral shoulder    Pr left heart cath,percutaneous  5/29/2014     xience to mRCA    Hx colonoscopy  10/16/15     Dr. Solitario Jimenez; colon polyp; repeat 5 yrs    Hx endoscopy  10/16/15     Dr. Solitario Jimenez; Grade A esophagitis        No Known Allergies        Family History   Problem Relation Age of Onset    Cancer Mother 72     breast    Psychiatric Disorder Mother      Alzheimers    Heart Disease Father     Heart Disease Sister     Heart Disease Brother 64     5 bypass at one time    COPD Brother     Heart Disease Sister     No Known Problems Sister         Current Facility-Administered Medications   Medication Dose Route Frequency    aspirin (ASPIRIN) tablet 650 mg  650 mg Oral ONCE    [START ON 2/15/2017] aspirin (ASPIRIN) tablet 325 mg  325 mg Oral DAILY    insulin lispro (HUMALOG) injection   SubCUTAneous AC&HS    HYDROcodone-acetaminophen (NORCO) 7.5-325 mg per tablet 1 Tab  1 Tab Oral Q6H PRN    atorvastatin (LIPITOR) tablet 40 mg  40 mg Oral QHS    losartan (COZAAR) tablet 25 mg  25 mg Oral DAILY    pantoprazole (PROTONIX) tablet 40 mg  40 mg Oral ACB    dextrose 40% (GLUTOSE) oral gel 1 Tube  15 g Oral PRN    glucagon (GLUCAGEN) injection 1 mg  1 mg IntraMUSCular PRN    dextrose (D50W) injection syrg 12.5-25 g  25-50 mL IntraVENous PRN    sodium chloride (NS) flush 5-10 mL  5-10 mL IntraVENous Q8H    sodium chloride (NS) flush 5-10 mL  5-10 mL IntraVENous PRN    acetaminophen (TYLENOL) tablet 650 mg  650 mg Oral Q4H PRN    bisacodyl (DULCOLAX) tablet 5 mg  5 mg Oral DAILY PRN         Review of Systems: A comprehensive review of systems was negative except for that written in the HPI. Objective:     Patient Vitals for the past 8 hrs:   BP Temp Pulse Resp SpO2   02/14/17 0729 125/64 - 89 - -   02/14/17 0727 117/60 - 77 - -   02/14/17 0725 128/58 97.5 °F (36.4 °C) 74 16 96 %   02/14/17 0336 105/63 - - - -   02/14/17 0332 146/77 - - - -   02/14/17 0330 130/79 97.7 °F (36.5 °C) 75 16 97 %       Physical Exam:    General:  Alert, cooperative, no distress, appears stated age. Eyes:  Conjunctivae/corneas clear. PERRL, EOMs intact. Fundi benign   Ears:  Normal TMs and external ear canals both ears. Nose: Nares normal. Septum midline. Mucosa normal. No drainage or sinus tenderness. Mouth/Throat: Lips, mucosa, and tongue normal. Teeth and gums normal.   Neck: Supple, symmetrical, trachea midline, no adenopathy, thyroid: no enlargment/tenderness/nodules, no carotid bruit and no JVD. Back:   Symmetric, no curvature. ROM normal. No CVA tenderness. Lungs:   Clear to auscultation bilaterally. Heart:  Regular rate and rhythm, S1, S2 normal, no murmur, click, rub or gallop. Abdomen:   Soft, non-tender. Bowel sounds normal. No masses,  No organomegaly. Extremities: Extremities normal, atraumatic, no cyanosis or edema. Pulses: 2+ and symmetric all extremities. Skin: Skin color, texture, turgor normal. No rashes or lesions   Lymph nodes: Cervical, supraclavicular, and axillary nodes normal.   Neurologic: CNII-XII intact. GCS15, A&Ox3. Normal sensation and reflexes throughout. Motor exam is 5/5 except 4-/5 R deltoid, 3/5 R triceps, 4/5 L triceps and 4+/5 L deltoid. Toes are downgoing. Gait is normal.  Coordination is normal.  Speech is fluent and appropriate. Assessment:     CTA head and neck reveals occlusion of the entire R vertebral artery from the origin almost to the basilar. The L vert is open and is likely the dominant vertebral artery. There is normal flow in the basilar. The MRI brain is suggestive of a very small acute cerebellar infarct.     The MRI C-spine reveals mild stenosis with mild cord compression at C5-6. 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 I have spoken with Dr. Drew Fleming and Dr. Chaka Rivera; we all agree on this and agree that the best treatment option is a loading dose of  mg followed by long term maintenance  mg qday. The patient and his wife understand and agree. He may go home on this regimen. We will arrange for F/U with Dr. Babatunde Ramires in her office after discharge. Thank you for allowing me to participate in his care!

## 2017-02-14 NOTE — PROGRESS NOTES
Problem: Self Care Deficits Care Plan (Adult)  Goal: *Acute Goals and Plan of Care (Insert Text)      OCCUPATIONAL THERAPY: Initial Assessment, Discharge and AM 2/14/2017  INPATIENT: Hospital Day: 4  [place] / [place] / Product Type: Medicare /      NAME/AGE/GENDER: Beverely Soulier is a 77 y.o. male      PRIMARY DIAGNOSIS:  Syncope Syncope Syncope        ICD-10: Treatment Diagnosis:        · Generalized Muscle Weakness (M62.81)  · Other lack of cordination (R27.8)   Precautions/Allergies:         Review of patient's allergies indicates no known allergies. ASSESSMENT:      Mr. Bernard Bosworth presents to the hospital with syncope and weakness in all the limbs. 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 Per Dr. Lizandro Casas, pt is able to participate in evaluation (no restrictions). Pt states that he feels back to normal except some ongoing weakness in his R dominant UE. Pt reports that his R UE has progressively gotten better day by day. Pt with fairly good strength in  RUE except some minor weakness in R hand vs L hand. Pt demonstrate good ability to complete transfers. Pt's coordination in extremities is WNL. Pt noted to have good balance with activity. Pt has no further OT needs at this time and will be discharged from our services. This section established at most recent assessment   PROBLEM LIST (Impairments causing functional limitations):  1. Decreased Strength    INTERVENTIONS PLANNED: (Benefits and precautions of occupational therapy have been discussed with the patient.)  1. n/a      TREATMENT PLAN: Frequency/Duration: Discharge  Rehabilitation Potential For Stated Goals: N/A      RECOMMENDED REHABILITATION/EQUIPMENT: (at time of discharge pending progress): None.                       OCCUPATIONAL PROFILE AND HISTORY:   History of Present Injury/Illness (Reason for Referral):  See H&P  Past Medical History/Comorbidities:   Mr. Flori Billy  has a past medical history of Acute myocardial infarction, subendocardial infarction, initial episode of care [facility]) (5/29/2014); CAD (coronary artery disease); Chronic kidney disease, stage II (mild) (5/29/2014); Hyperglycemia (5/29/2014); and Tobacco use disorder (5/29/2014). Mr. Flori Billy  has a past surgical history that includes orthopaedic; left heart cath,percutaneous (5/29/2014); colonoscopy (10/16/15); and endoscopy (10/16/15). Social History/Living Environment:   Home Environment: Private residence  # Steps to Enter: 2  Rails to Enter: No  Wheelchair Ramp: No  One/Two Story Residence:  (three levels, lives on main level)  Living Alone: No  Support Systems: Family member(s), Spouse/Significant Other/Partner  Patient Expects to be Discharged to[de-identified] Private residence  Current DME Used/Available at Home:  (has straight cane but does not use)  Tub or Shower Type: Shower (built in seat)  Prior Level of Function/Work/Activity:  Independent with ADL/functional mobility and living with his wife. [place] farm and has horses. 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 Pt drives.   Dominant Side:         RIGHT  Personal Factors:          Past/Current Experience:  Hx of myocardial infarct in 2014   Number of Personal Factors/Comorbidities that affect the Plan of Care: Expanded review of therapy/medical records (1-2):  MODERATE COMPLEXITY   ASSESSMENT OF OCCUPATIONAL PERFORMANCE[de-identified]   Activities of Daily Living:           Basic ADLs (From Assessment) Complex ADLs (From Assessment)   Basic ADL  Feeding: Independent  Oral Facial Hygiene/Grooming: Independent  Bathing: Independent  Upper Body Dressing: Independent  Lower Body Dressing: Independent  Toileting: Independent Instrumental ADL  Homemaking: Modified independent   Grooming/Bathing/Dressing Activities of Daily Living     Cognitive Retraining  Safety/Judgement: Awareness of environment                 Functional Transfers  Toilet Transfer : Independent  Tub Transfer: Independent  Shower Transfer: Independent     Bed/Mat Mobility  Rolling: Independent  Supine to Sit: Independent  Sit to Supine: Independent  Sit to Stand: Independent  Bed to Chair: Independent  Scooting: Independent          Most Recent Physical Functioning:   Gross Assessment:  AROM: Within functional limits  Strength: Generally decreased, functional (decreased  strength on R side)  Coordination: Within functional limits  Tone: Normal  Sensation: Intact (itching sensation along R shoulder)               Posture:  Posture (WDL): Within defined limits  Balance:  Sitting: Intact  Standing: Intact Bed Mobility:  Rolling: Independent  Supine to Sit: Independent  Sit to Supine: Independent  Scooting: Independent  Wheelchair Mobility:     Transfers:  Sit to Stand: Independent  Stand to Sit: Independent  Bed to Chair: Independent                 Patient Vitals for the past 6 hrs:       BP BP Patient Position SpO2 Pulse   17 0725 128/58 Supine 96 % 74   17 0727 117/60 - - 77   17 0729 125/64 - - 89   17 1121 118/47 - 98 % 76        Mental Status  Neurologic State: Alert  Orientation Level: Oriented X4  Cognition: Appropriate decision making, Appropriate for age attention/concentration, Follows commands  Perception: Appears intact  Perseveration: No perseveration noted  Safety/Judgement: Awareness of environment                               Physical Skills Involved:  1. Strength Cognitive Skills Affected (resulting in the inability to perform in a timely and safe manner):  1. [facility] Psychosocial Skills Affected:  1. N/A   Number of elements that affect the Plan of Care: 1-3:  LOW COMPLEXITY   CLINICAL DECISION MAKIN [facility] [address] [id number] AM-PAC 6 Clicks   Basic Mobility Inpatient Short Form  How much help from another person does the patient currently need. .. Total A Lot A Little None   1.   Putting on and taking off regular lower body clothing?   [ ] 1   [ ] 2   [ ] 3   [X] 4   2. Bathing (including washing, rinsing, drying)? [ ] 1   [ ] 2   [ ] 3   [X] 4   3. Toileting, which includes using toilet, bedpan or urinal?   [ ] 1   [ ] 2   [ ] 3   [X] 4   4. Putting on and taking off regular upper body clothing?   [ ] 1   [ ] 2   [ ] 3   [X] 4   5. Taking care of personal grooming such as brushing teeth? [ ] 1   [ ] 2   [ ] 3   [X] 4   6. Eating meals? [ ] 1   [ ] 2   [ ] 3   [X] 4   © 2007, Trustees of [address] [place] [address] [address] [address], under license to [employer]. All rights reserved    Score:  Initial: 24 Most Recent: X (Date: -- )     Interpretation of Tool:  Represents activities that are increasingly more difficult (i.e. Bed mobility, Transfers, Gait). Score 24 23 22-20 19-15 14-10 9-7 6       Modifier CH CI CJ CK CL CM CN         · Self Care:               - CURRENT STATUS:    CH - 0% impaired, limited or restricted               - GOAL STATUS:           CH - 0% impaired, limited or restricted               - D/C STATUS:                       CH - 0% impaired, limited or restricted  [place] / Product Type: Medicare /       Medical Necessity:     · n/a. Reason for Services/Other Comments:  · n/a. Use of outcome tool(s) and clinical judgement create a POC that gives a: LOW COMPLEXITY             TREATMENT:   (In addition to Assessment/Re-Assessment sessions the following treatments were rendered)      Pre-treatment Symptoms/Complaints:  Weakness on R side progressively improving  Pain: Initial:   Pain Intensity 1: 0  Post Session:  0/10      Assessment/Reassessment only, no treatment provided today     Braces/Orthotics/Lines/Etc:   · O2 Device: Room air  Treatment/Session Assessment:    · Response to Treatment:  Evaluation only.   · Interdisciplinary Collaboration:  · Occupational Therapist  · Registered Nurse  · Physician  · After treatment position/precautions:  · RN notified  · Family at bedside  · Pt with PT  · Compliance with Program/Exercises: n/a. · Recommendations/Intent for next treatment session:  Discharge.   Total Treatment Duration:  OT Patient Time In/Time Out  Time In: 0948  Time Out: [address], OT

## 2017-02-15 ENCOUNTER — PATIENT OUTREACH (OUTPATIENT)
Dept: CASE MANAGEMENT | Age: 67
End: 2017-02-15

## 2017-02-15 NOTE — PROGRESS NOTES
Transition of Care Discharge Follow-Up Questionnaire       Date/Time of Call:   February 15, 2017 1:26PM   What was the patient hospitalized for? Patient hospitalized for Syncope             Does the patient understand his/her diagnosis and/or treatment and what happened during the hospitalization? Patient states understanding of diagnosis and treatment during hospitalization. Did the patient receive discharge instructions? Patient states discharge instructions explained and received before discharge to home. Review any discharge instructions (see notes in [facility]). Ask patient if they understand these. Do they have any questions? Discharge instructions reviewed with  patient per [facility], opportunity for questions and clarification provided. Patient states no questions at this time. Were home services ordered (nursing, PT, OT, ST, etc.)? No home health services ordered. If so, has the first visit occurred? If not, why? (Assist with coordination of services if necessary. ) NA         Was any DME ordered? No durable medical equipment ordered. If so, has it been received? If not, why?  (Assist with coordination of arranging DME orders if necessary. ) NA         Complete a review of all medications (new, continued and discontinued meds per the D/C instructions and medication tab in [facility]). Care Coordinator reviewed all medications with patient per [facility], patient states one new prescriptions added Aspirin 325 mg po before discharge to home. Were all new prescriptions filled? If not, why?  (Assist with obtainment of medications if necessary.) Patient states new prescription filled and currently being taken per doctors order. Does the patient understand the purpose and dosing instructions for all medications? (If patient has questions, provide explanation and education.) Patient states understanding of medication purposes and dosing instructions.    Does the patient have any problems in performing ADLs? (If patient is unable to perform ADLs - what is the limiting factor(s)? Do they have a support system that can assist? If no support system is present, discuss possible assistance that they may be able to obtain.) Patient states he can perform ADL's independently. Does the patient have all follow-up appointments scheduled? Has transportation been arranged? [facility] Pulmonary follow-up should be within 7 days of discharge; all others should have PCP follow-up within 7 days of discharge; follow-ups with other specialists as appropriate or ordered.) Patient states follow up appointment scheduled with [facility] @ 2:30PM with Dr. Jose Eduardo Glynn. Patient states that follow up appointment has been scheduled with Dr. Zakia Grullon (Neurosurgeon) also. Patient voiced no transportation needs at this time. Any other questions or concerns expressed by the patient? Patient expresses no questions or concerns. Schedule next appointment with [facility] [place] Coordinator or refer to RN Case Manager/  as appropriate. No further needs identified, patient instructed to call Care Coordinator if further questions or concerns arise.    [place] Call Completed By: Adin Lindquist LPN  Care Coordinator   [facility]

## 2017-02-23 PROBLEM — I50.32 CHRONIC DIASTOLIC CONGESTIVE HEART FAILURE (HCC): Status: ACTIVE | Noted: 2017-02-23

## 2017-02-23 PROBLEM — R29.898 WEAKNESS OF BOTH ARMS: Status: RESOLVED | Noted: 2017-02-12 | Resolved: 2017-02-23

## 2017-03-03 PROBLEM — I77.9 BILATERAL CAROTID ARTERY DISEASE (HCC): Status: ACTIVE | Noted: 2017-03-03

## 2017-03-03 PROBLEM — I49.3 PVC (PREMATURE VENTRICULAR CONTRACTION): Status: ACTIVE | Noted: 2017-03-03

## 2017-03-03 PROBLEM — Z86.73 HISTORY OF CVA (CEREBROVASCULAR ACCIDENT): Status: ACTIVE | Noted: 2017-03-03

## 2018-01-11 PROBLEM — E11.21 TYPE 2 DIABETES MELLITUS WITH NEPHROPATHY (HCC): Status: ACTIVE | Noted: 2018-01-11

## 2018-06-18 PROBLEM — Z79.4 UNCONTROLLED TYPE 2 DIABETES MELLITUS WITH HYPERGLYCEMIA, WITH LONG-TERM CURRENT USE OF INSULIN (HCC): Status: ACTIVE | Noted: 2018-06-18

## 2018-06-18 PROBLEM — E11.21 TYPE 2 DIABETES MELLITUS WITH NEPHROPATHY (HCC): Status: RESOLVED | Noted: 2018-01-11 | Resolved: 2018-06-18

## 2018-06-18 PROBLEM — E11.65 UNCONTROLLED TYPE 2 DIABETES MELLITUS WITH HYPERGLYCEMIA, WITH LONG-TERM CURRENT USE OF INSULIN (HCC): Status: ACTIVE | Noted: 2018-06-18

## 2019-08-16 ENCOUNTER — TELEPHONE (OUTPATIENT)
Dept: CASE MANAGEMENT | Age: 69
End: 2019-08-16

## 2019-08-27 PROBLEM — K21.9 GASTROESOPHAGEAL REFLUX DISEASE WITHOUT ESOPHAGITIS: Status: ACTIVE | Noted: 2019-08-27

## 2020-03-02 PROBLEM — Z78.9 STATIN INTOLERANCE: Status: ACTIVE | Noted: 2020-03-02

## 2020-03-02 PROBLEM — N18.2 CONTROLLED TYPE 2 DIABETES MELLITUS WITH STAGE 2 CHRONIC KIDNEY DISEASE, WITH LONG-TERM CURRENT USE OF INSULIN (HCC): Status: ACTIVE | Noted: 2018-01-11

## 2020-03-02 PROBLEM — M17.12 PRIMARY OSTEOARTHRITIS OF LEFT KNEE: Status: ACTIVE | Noted: 2020-03-02

## 2020-03-02 PROBLEM — E11.22 CONTROLLED TYPE 2 DIABETES MELLITUS WITH STAGE 2 CHRONIC KIDNEY DISEASE, WITH LONG-TERM CURRENT USE OF INSULIN (HCC): Status: ACTIVE | Noted: 2018-01-11

## 2020-03-02 PROBLEM — I65.23 BILATERAL CAROTID ARTERY STENOSIS: Status: ACTIVE | Noted: 2020-03-02

## 2020-03-02 PROBLEM — Z79.4 CONTROLLED TYPE 2 DIABETES MELLITUS WITH STAGE 2 CHRONIC KIDNEY DISEASE, WITH LONG-TERM CURRENT USE OF INSULIN (HCC): Status: ACTIVE | Noted: 2018-01-11

## 2020-03-04 ENCOUNTER — HOSPITAL ENCOUNTER (OUTPATIENT)
Dept: ULTRASOUND IMAGING | Age: 70
Discharge: HOME | End: 2020-03-04
Attending: FAMILY MEDICINE
Payer: MEDICARE

## 2020-03-04 DIAGNOSIS — I65.23 BILATERAL CAROTID ARTERY STENOSIS: ICD-10-CM

## 2020-03-04 PROCEDURE — 93880 EXTRACRANIAL BILAT STUDY: CPT

## 2020-03-05 NOTE — PROGRESS NOTES
Call back carotid ultrasound with some concern for increased narrowing of the left internal carotid artery at 70 to 99%. This appears maybe to have increased since last evaluation. I will refer patient to specialist in regards to this.

## 2020-03-13 ENCOUNTER — HOSPITAL ENCOUNTER (OUTPATIENT)
Dept: CT IMAGING | Age: 70
Discharge: HOME | End: 2020-03-13
Attending: FAMILY MEDICINE
Payer: MEDICARE

## 2020-03-13 DIAGNOSIS — I65.23 BILATERAL CAROTID ARTERY STENOSIS: ICD-10-CM

## 2020-03-13 LAB — CREAT BLD-MCNC: 1.1 MG/DL (ref 0.8–1.5)

## 2020-03-13 PROCEDURE — 70498 CT ANGIOGRAPHY NECK: CPT

## 2020-03-13 PROCEDURE — 74011636320 HC RX REV CODE- 636/320: Performed by: FAMILY MEDICINE

## 2020-03-13 PROCEDURE — 82565 ASSAY OF CREATININE: CPT

## 2020-03-13 PROCEDURE — 74011000258 HC RX REV CODE- 258: Performed by: FAMILY MEDICINE

## 2020-03-13 RX ORDER — SODIUM CHLORIDE 0.9 % (FLUSH) 0.9 %
10 SYRINGE (ML) INJECTION
Status: COMPLETED | OUTPATIENT
Start: 2020-03-13 | End: 2020-03-13

## 2020-03-13 RX ADMIN — IOPAMIDOL 50 ML: 755 INJECTION, SOLUTION INTRAVENOUS at 14:24

## 2020-03-13 RX ADMIN — Medication 10 ML: at 14:24

## 2020-03-13 RX ADMIN — SODIUM CHLORIDE 100 ML: 900 INJECTION, SOLUTION INTRAVENOUS at 14:24

## 2020-03-13 NOTE — PROGRESS NOTES
Call back the following carotid results:  Impression:  1. Progression of disease in the proximal left internal carotid artery now seen  a 70-75% stenosis. 2. Improvement in patency of the right vertebral artery.     Patient may wish to discuss this further with a vascular specialist.  Ask if he would like a referral.

## 2020-03-19 PROBLEM — Z01.810 PREOP CARDIOVASCULAR EXAM: Status: ACTIVE | Noted: 2020-03-19

## 2020-03-20 NOTE — PROGRESS NOTES
Pt notified of results. Pt states that he has already seen Dr. Christopher Kang and wanted to know how Dr. Toni Fermin about him doing surgery?

## 2020-04-18 PROBLEM — Z01.810 PREOP CARDIOVASCULAR EXAM: Status: RESOLVED | Noted: 2020-03-19 | Resolved: 2020-04-18

## 2020-09-21 PROBLEM — E11.65 UNCONTROLLED TYPE 2 DIABETES MELLITUS WITH HYPERGLYCEMIA, WITH LONG-TERM CURRENT USE OF INSULIN (HCC): Status: RESOLVED | Noted: 2018-06-18 | Resolved: 2020-09-21

## 2020-09-21 PROBLEM — N52.9 ERECTILE DYSFUNCTION: Status: ACTIVE | Noted: 2020-09-21

## 2020-09-21 PROBLEM — Z79.4 UNCONTROLLED TYPE 2 DIABETES MELLITUS WITH HYPERGLYCEMIA, WITH LONG-TERM CURRENT USE OF INSULIN (HCC): Status: RESOLVED | Noted: 2018-06-18 | Resolved: 2020-09-21

## 2020-09-30 ENCOUNTER — HOSPITAL ENCOUNTER (OUTPATIENT)
Dept: CT IMAGING | Age: 70
Discharge: HOME | End: 2020-09-30
Attending: FAMILY MEDICINE
Payer: MEDICARE

## 2020-09-30 ENCOUNTER — HOSPITAL ENCOUNTER (OUTPATIENT)
Dept: ULTRASOUND IMAGING | Age: 70
Discharge: HOME | End: 2020-09-30
Attending: FAMILY MEDICINE
Payer: MEDICARE

## 2020-09-30 DIAGNOSIS — Z13.6 SCREENING FOR CARDIOVASCULAR CONDITION: ICD-10-CM

## 2020-09-30 DIAGNOSIS — Z87.891 PERSONAL HISTORY OF TOBACCO USE, PRESENTING HAZARDS TO HEALTH: ICD-10-CM

## 2020-09-30 PROCEDURE — G0297 LDCT FOR LUNG CA SCREEN: HCPCS

## 2020-09-30 PROCEDURE — 76706 US ABDL AORTA SCREEN AAA: CPT

## 2020-10-01 NOTE — PROGRESS NOTES
Call back the following low-dose chest CT results:  IMPRESSION:    Mild bibasilar linear opacities, likely atelectasis or scarring. .  Benign  appearance or behavior: Continue annual screening with noncontrast chest CT again in 12 months.

## 2020-10-19 ENCOUNTER — ANESTHESIA EVENT (OUTPATIENT)
Dept: SURGERY | Age: 70
End: 2020-10-19
Payer: MEDICARE

## 2020-10-20 ENCOUNTER — APPOINTMENT (OUTPATIENT)
Dept: GENERAL RADIOLOGY | Age: 70
End: 2020-10-20
Attending: ORTHOPAEDIC SURGERY
Payer: MEDICARE

## 2020-10-20 ENCOUNTER — ANESTHESIA (OUTPATIENT)
Dept: SURGERY | Age: 70
End: 2020-10-20
Payer: MEDICARE

## 2020-10-20 ENCOUNTER — HOSPITAL ENCOUNTER (OUTPATIENT)
Age: 70
Setting detail: OUTPATIENT SURGERY
Discharge: HOME | End: 2020-10-20
Attending: ORTHOPAEDIC SURGERY | Admitting: ORTHOPAEDIC SURGERY
Payer: MEDICARE

## 2020-10-20 VITALS
OXYGEN SATURATION: 93 % | RESPIRATION RATE: 18 BRPM | DIASTOLIC BLOOD PRESSURE: 58 MMHG | HEART RATE: 68 BPM | WEIGHT: 205 LBS | SYSTOLIC BLOOD PRESSURE: 100 MMHG | TEMPERATURE: 98.4 F | BODY MASS INDEX: 27.05 KG/M2

## 2020-10-20 DIAGNOSIS — L76.82 PAIN AT SURGICAL INCISION: Primary | ICD-10-CM

## 2020-10-20 LAB — GLUCOSE BLD STRIP.AUTO-MCNC: 139 MG/DL (ref 65–100)

## 2020-10-20 PROCEDURE — C1713 ANCHOR/SCREW BN/BN,TIS/BN: HCPCS | Performed by: ORTHOPAEDIC SURGERY

## 2020-10-20 PROCEDURE — 73600 X-RAY EXAM OF ANKLE: CPT

## 2020-10-20 PROCEDURE — 77030008467 HC STPLR SKN COVD -B: Performed by: ORTHOPAEDIC SURGERY

## 2020-10-20 PROCEDURE — 74011250636 HC RX REV CODE- 250/636: Performed by: ANESTHESIOLOGY

## 2020-10-20 PROCEDURE — 77030021122 HC SPLNT MAT FST BSNM -A: Performed by: ORTHOPAEDIC SURGERY

## 2020-10-20 PROCEDURE — 76210000063 HC OR PH I REC FIRST 0.5 HR: Performed by: ORTHOPAEDIC SURGERY

## 2020-10-20 PROCEDURE — 76060000034 HC ANESTHESIA 1.5 TO 2 HR: Performed by: ORTHOPAEDIC SURGERY

## 2020-10-20 PROCEDURE — 2709999900 HC NON-CHARGEABLE SUPPLY: Performed by: ORTHOPAEDIC SURGERY

## 2020-10-20 PROCEDURE — 77030008462 HC STPLR SKN PROX J&J -A: Performed by: ORTHOPAEDIC SURGERY

## 2020-10-20 PROCEDURE — 74011250636 HC RX REV CODE- 250/636: Performed by: NURSE ANESTHETIST, CERTIFIED REGISTERED

## 2020-10-20 PROCEDURE — 76942 ECHO GUIDE FOR BIOPSY: CPT | Performed by: ORTHOPAEDIC SURGERY

## 2020-10-20 PROCEDURE — 74011250636 HC RX REV CODE- 250/636: Performed by: ORTHOPAEDIC SURGERY

## 2020-10-20 PROCEDURE — 77030003862 HC BIT DRL SYNT -B: Performed by: ORTHOPAEDIC SURGERY

## 2020-10-20 PROCEDURE — 77030002986 HC SUT PROL J&J -A: Performed by: ORTHOPAEDIC SURGERY

## 2020-10-20 PROCEDURE — 77030000032 HC CUF TRNQT ZIMM -B: Performed by: ORTHOPAEDIC SURGERY

## 2020-10-20 PROCEDURE — 74011250637 HC RX REV CODE- 250/637: Performed by: ANESTHESIOLOGY

## 2020-10-20 PROCEDURE — 76010010054 HC POST OP PAIN BLOCK: Performed by: ORTHOPAEDIC SURGERY

## 2020-10-20 PROCEDURE — 77030002933 HC SUT MCRYL J&J -A: Performed by: ORTHOPAEDIC SURGERY

## 2020-10-20 PROCEDURE — 77030000031 HC BIT DRL QC SYNT -C: Performed by: ORTHOPAEDIC SURGERY

## 2020-10-20 PROCEDURE — 77030003602 HC NDL NRV BLK BBMI -B: Performed by: NURSE ANESTHETIST, CERTIFIED REGISTERED

## 2020-10-20 PROCEDURE — 77030031139 HC SUT VCRL2 J&J -A: Performed by: ORTHOPAEDIC SURGERY

## 2020-10-20 PROCEDURE — 74011000250 HC RX REV CODE- 250: Performed by: NURSE ANESTHETIST, CERTIFIED REGISTERED

## 2020-10-20 PROCEDURE — 76010000162 HC OR TIME 1.5 TO 2 HR INTENSV-TIER 1: Performed by: ORTHOPAEDIC SURGERY

## 2020-10-20 PROCEDURE — 76210000020 HC REC RM PH II FIRST 0.5 HR: Performed by: ORTHOPAEDIC SURGERY

## 2020-10-20 PROCEDURE — 82962 GLUCOSE BLOOD TEST: CPT

## 2020-10-20 PROCEDURE — 77030029637: Performed by: ORTHOPAEDIC SURGERY

## 2020-10-20 DEVICE — IMPLANTABLE DEVICE: Type: IMPLANTABLE DEVICE | Site: ANKLE | Status: FUNCTIONAL

## 2020-10-20 DEVICE — SCREW BNE L14MM DIA2.7MM CORT TI ST NONCANNULATED LOK FULL: Type: IMPLANTABLE DEVICE | Site: ANKLE | Status: FUNCTIONAL

## 2020-10-20 DEVICE — SCREW BNE L18MM DIA3.5MM CORT TI ST NONCANNULATED LOK FULL: Type: IMPLANTABLE DEVICE | Site: ANKLE | Status: FUNCTIONAL

## 2020-10-20 DEVICE — SCREW BNE L12MM DIA3.5MM TI ST LOK FULL THRD T15 STARDRV: Type: IMPLANTABLE DEVICE | Site: ANKLE | Status: FUNCTIONAL

## 2020-10-20 DEVICE — SCREW BNE L12MM DIA2.7MM CORT TI ST NONCANNULATED LOK FULL: Type: IMPLANTABLE DEVICE | Site: ANKLE | Status: FUNCTIONAL

## 2020-10-20 DEVICE — SCREW BNE L16MM DIA3.5MM CORT TI ST NONCANNULATED LOK FULL: Type: IMPLANTABLE DEVICE | Site: ANKLE | Status: FUNCTIONAL

## 2020-10-20 DEVICE — SCREW BNE L55MM DIA4MM CANC TI SELF DRL ST NONCANNULATED: Type: IMPLANTABLE DEVICE | Site: ANKLE | Status: FUNCTIONAL

## 2020-10-20 RX ORDER — FENTANYL CITRATE 50 UG/ML
100 INJECTION, SOLUTION INTRAMUSCULAR; INTRAVENOUS AS NEEDED
Status: COMPLETED | OUTPATIENT
Start: 2020-10-20 | End: 2020-10-20

## 2020-10-20 RX ORDER — PROPOFOL 10 MG/ML
INJECTION, EMULSION INTRAVENOUS
Status: DISCONTINUED | OUTPATIENT
Start: 2020-10-20 | End: 2020-10-20 | Stop reason: HOSPADM

## 2020-10-20 RX ORDER — ACETAMINOPHEN 500 MG
1000 TABLET ORAL ONCE
Status: COMPLETED | OUTPATIENT
Start: 2020-10-20 | End: 2020-10-20

## 2020-10-20 RX ORDER — OXYCODONE AND ACETAMINOPHEN 5; 325 MG/1; MG/1
1 TABLET ORAL
Qty: 40 TAB | Refills: 0 | Status: SHIPPED | OUTPATIENT
Start: 2020-10-20 | End: 2020-10-27

## 2020-10-20 RX ORDER — CEFAZOLIN SODIUM/WATER 2 G/20 ML
2 SYRINGE (ML) INTRAVENOUS
Status: COMPLETED | OUTPATIENT
Start: 2020-10-20 | End: 2020-10-20

## 2020-10-20 RX ORDER — DEXAMETHASONE SODIUM PHOSPHATE 4 MG/ML
INJECTION, SOLUTION INTRA-ARTICULAR; INTRALESIONAL; INTRAMUSCULAR; INTRAVENOUS; SOFT TISSUE
Status: COMPLETED | OUTPATIENT
Start: 2020-10-20 | End: 2020-10-20

## 2020-10-20 RX ORDER — FLUMAZENIL 0.1 MG/ML
0.2 INJECTION INTRAVENOUS
Status: DISCONTINUED | OUTPATIENT
Start: 2020-10-20 | End: 2020-10-20 | Stop reason: HOSPADM

## 2020-10-20 RX ORDER — LIDOCAINE HYDROCHLORIDE 10 MG/ML
0.1 INJECTION INFILTRATION; PERINEURAL AS NEEDED
Status: DISCONTINUED | OUTPATIENT
Start: 2020-10-20 | End: 2020-10-20 | Stop reason: HOSPADM

## 2020-10-20 RX ORDER — ROPIVACAINE HYDROCHLORIDE 5 MG/ML
INJECTION, SOLUTION EPIDURAL; INFILTRATION; PERINEURAL
Status: COMPLETED | OUTPATIENT
Start: 2020-10-20 | End: 2020-10-20

## 2020-10-20 RX ORDER — NALOXONE HYDROCHLORIDE 0.4 MG/ML
0.1 INJECTION, SOLUTION INTRAMUSCULAR; INTRAVENOUS; SUBCUTANEOUS AS NEEDED
Status: DISCONTINUED | OUTPATIENT
Start: 2020-10-20 | End: 2020-10-20 | Stop reason: HOSPADM

## 2020-10-20 RX ORDER — GUAIFENESIN 100 MG/5ML
81 LIQUID (ML) ORAL EVERY 12 HOURS
Qty: 60 TAB | Refills: 0 | Status: SHIPPED | OUTPATIENT
Start: 2020-10-20

## 2020-10-20 RX ORDER — HYDROMORPHONE HYDROCHLORIDE 2 MG/ML
0.5 INJECTION, SOLUTION INTRAMUSCULAR; INTRAVENOUS; SUBCUTANEOUS
Status: DISCONTINUED | OUTPATIENT
Start: 2020-10-20 | End: 2020-10-20 | Stop reason: HOSPADM

## 2020-10-20 RX ORDER — MIDAZOLAM HYDROCHLORIDE 1 MG/ML
2 INJECTION, SOLUTION INTRAMUSCULAR; INTRAVENOUS
Status: COMPLETED | OUTPATIENT
Start: 2020-10-20 | End: 2020-10-20

## 2020-10-20 RX ORDER — SODIUM CHLORIDE, SODIUM LACTATE, POTASSIUM CHLORIDE, CALCIUM CHLORIDE 600; 310; 30; 20 MG/100ML; MG/100ML; MG/100ML; MG/100ML
100 INJECTION, SOLUTION INTRAVENOUS CONTINUOUS
Status: DISCONTINUED | OUTPATIENT
Start: 2020-10-20 | End: 2020-10-20 | Stop reason: HOSPADM

## 2020-10-20 RX ORDER — OXYCODONE HYDROCHLORIDE 5 MG/1
5 TABLET ORAL
Status: DISCONTINUED | OUTPATIENT
Start: 2020-10-20 | End: 2020-10-20 | Stop reason: HOSPADM

## 2020-10-20 RX ORDER — DIPHENHYDRAMINE HYDROCHLORIDE 50 MG/ML
12.5 INJECTION, SOLUTION INTRAMUSCULAR; INTRAVENOUS
Status: DISCONTINUED | OUTPATIENT
Start: 2020-10-20 | End: 2020-10-20 | Stop reason: HOSPADM

## 2020-10-20 RX ORDER — SODIUM CHLORIDE, SODIUM LACTATE, POTASSIUM CHLORIDE, CALCIUM CHLORIDE 600; 310; 30; 20 MG/100ML; MG/100ML; MG/100ML; MG/100ML
75 INJECTION, SOLUTION INTRAVENOUS CONTINUOUS
Status: DISCONTINUED | OUTPATIENT
Start: 2020-10-20 | End: 2020-10-20 | Stop reason: HOSPADM

## 2020-10-20 RX ORDER — PROPOFOL 10 MG/ML
INJECTION, EMULSION INTRAVENOUS AS NEEDED
Status: DISCONTINUED | OUTPATIENT
Start: 2020-10-20 | End: 2020-10-20 | Stop reason: HOSPADM

## 2020-10-20 RX ADMIN — ROPIVACAINE HYDROCHLORIDE 25 ML: 150 INJECTION, SOLUTION EPIDURAL; INFILTRATION; PERINEURAL at 10:06

## 2020-10-20 RX ADMIN — PHENYLEPHRINE HYDROCHLORIDE 100 MCG: 10 INJECTION INTRAVENOUS at 13:23

## 2020-10-20 RX ADMIN — PROPOFOL 40 MG: 10 INJECTION, EMULSION INTRAVENOUS at 12:17

## 2020-10-20 RX ADMIN — ACETAMINOPHEN 1000 MG: 500 TABLET, FILM COATED ORAL at 09:35

## 2020-10-20 RX ADMIN — SODIUM CHLORIDE, SODIUM LACTATE, POTASSIUM CHLORIDE, AND CALCIUM CHLORIDE 100 ML/HR: 600; 310; 30; 20 INJECTION, SOLUTION INTRAVENOUS at 09:37

## 2020-10-20 RX ADMIN — DEXAMETHASONE SODIUM PHOSPHATE 4 MG: 4 INJECTION, SOLUTION INTRAMUSCULAR; INTRAVENOUS at 10:08

## 2020-10-20 RX ADMIN — MIDAZOLAM 1 MG: 1 INJECTION INTRAMUSCULAR; INTRAVENOUS at 10:05

## 2020-10-20 RX ADMIN — Medication 2 G: at 12:20

## 2020-10-20 RX ADMIN — DEXAMETHASONE SODIUM PHOSPHATE 4 MG: 4 INJECTION, SOLUTION INTRAMUSCULAR; INTRAVENOUS at 10:06

## 2020-10-20 RX ADMIN — FENTANYL CITRATE 50 MCG: 50 INJECTION, SOLUTION INTRAMUSCULAR; INTRAVENOUS at 10:05

## 2020-10-20 RX ADMIN — ROPIVACAINE HYDROCHLORIDE 15 ML: 150 INJECTION, SOLUTION EPIDURAL; INFILTRATION; PERINEURAL at 10:08

## 2020-10-20 RX ADMIN — PROPOFOL 120 MCG/KG/MIN: 10 INJECTION, EMULSION INTRAVENOUS at 12:17

## 2020-10-20 NOTE — DISCHARGE INSTRUCTIONS
Elevate operative foot above heart  No weight bearing  Do not remove splint  No tobacco  Expect finger swelling  Call office for questions or problems  Wound care as discussed        [place] [address] IF     Call your doctor if pain is NOT relieved by medication.   Excessive bleeding that does not stop after holding pressure over the area  · Temperature of 101 degrees F or above  · Excessive redness, swelling or bruising, and/ or green or yellow, smelly discharge from incision      TYPICAL SIDE EFFECTS OF PAIN MEDICATION:     Constipation: Drink lots of fluids. Over the counter stool softener if needed.    Nausea: Take pain medication with food. Call your doctor with persistent nausea. ACTIVITY  · As tolerated and as directed by your doctor. · Bathe or shower as directed by your doctor. DIET  · Day of surgery: Clear liquids until no nausea or vomiting; small portion, light diet [place] foods (ex: baked chicken, plain rice, grits, scrambled eggs, toast). Nothing greasy, fried or spicy today. · Advance to regular diet on second day, unless your doctor orders otherwise. · If nausea and vomiting continues, call your doctor. PAIN  · Take pain medication as directed by your doctor. · DO NOT take aspirin or blood thinners unless directed by your doctor. AFTER ANESTHESIA   · For the first 24 hours: DO NOT Drive, Drink alcoholic beverages, or Make important decisions. · Be aware of dizziness following anesthesia and while taking pain medication. DISCHARGE SUMMARY from Nurse    PATIENT INSTRUCTIONS:    After general anesthesia or intravenous sedation, for 24 hours or while taking prescription Narcotics:  · Limit your activities  · Do not drive and operate hazardous machinery  · Do not make important personal or business decisions  · Do  not drink alcoholic beverages  · If you have not urinated within 8 hours after discharge, please contact your surgeon on call.     *  Please give a list of your current medications to your Primary Care Provider. *  Please update this list whenever your medications are discontinued, doses are      changed, or new medications (including over-the-counter products) are added. *  Please carry medication information at all times in case of emergency situations. Preventing Infection at Home  We care about preventing infection and avoiding the spread of germs - not only when you are in the hospital but also when you return home. When you return home from the hospital, its important to take the following steps to help prevent infection and avoid spreading germs that could infect you and others. Ask everyone in your home to follow these guidelines, too. Clean Your Hands  · Clean your hands whenever your hands are visibly dirty, before you eat, before or after touching your mouth, nose or eyes, and before preparing food. Clean them after contact with body fluids, using the restroom, touching animals or changing diapers. · When washing hands, wet them with warm water and work up a lather. Rub hands for at least 15 seconds, then rinse them and pat them dry with a clean towel or paper towel. · When using hand sanitizers, it should take about 15 seconds to rub your hands dry. If not, you probably didnt apply enough . Cover Your Sneeze or Cough  Germs are released into the air whenever you sneeze or cough. To prevent the spread of infection:  · Turn away from other people before coughing or sneezing. · Cover your mouth or nose with a tissue when you cough or sneeze. Put the tissue in the trash. · If you dont have a tissue, cough or sneeze into your upper sleeve, not your hands. · Always clean your hands after coughing or sneezing. Care for Wounds  Your skin is your bodys first line of defense against germs, but an open wound leaves an easy way for germs to enter your body.  To prevent infection:  · Clean your hands before and after changing wound dressings, and wear gloves to change dressings if recommended by your doctor. · Take special care with IV lines or other devices inserted into the body. If you must touch them, clean your hands first.  · Follow any specific instructions from your doctor to care for your wounds. Contact your doctor if you experience any signs of infection, such as fever or increased redness at the surgical or wound site. Keep a Clean Home  · Clean or wipe commonly touched hard surfaces like door handles, sinks, tabletops, phones and TV remotes. · Use products labeled disinfectant to kill harmful bacteria and viruses. · Use a clean cloth or paper towel to clean and dry surfaces. Wiping surfaces with a dirty dishcloth, sponge or towel will only spread germs. · Never share toothbrushes, joiner, drinking glasses, utensils, razor blades, face cloths or bath towels to avoid spreading germs. · Be sure that the linens that you sleep on are clean. · Keep pets away from wounds and wash your hands after touching pets, their toys or bedding. We care about you and your health. Remember, preventing infections is a team effort between you, your family, friends and health care providers. These are general instructions for a healthy lifestyle:    No smoking/ No tobacco products/ Avoid exposure to second hand smoke    Surgeon General's Warning:  Quitting smoking now greatly reduces serious risk to your health. Obesity, smoking, and sedentary lifestyle greatly increases your risk for illness    A healthy diet, regular physical exercise & weight monitoring are important for maintaining a healthy lifestyle    You may be retaining fluid if you have a history of heart failure or if you experience any of the following symptoms:  Weight gain of 3 pounds or more overnight or 5 pounds in a week, increased swelling in our hands or feet or shortness of breath while lying flat in bed.   Please call your doctor as soon as you notice any of these symptoms; do not wait until your next office visit. Recognize signs and symptoms of STROKE:    F-face looks uneven    A-arms unable to move or move unevenly    S-speech slurred or non-existent    T-time-call 911 as soon as signs and symptoms begin-DO NOT go       Back to bed or wait to see if you get better-TIME IS BRAIN. Advance Care Planning  People with COVID-19 may have no symptoms, mild symptoms, such as fever, cough, and shortness of breath or they may have more severe illness, developing severe and fatal pneumonia. As a result, Advance Care Planning with attention to naming a health care decision maker (someone you trust to make healthcare decisions for you if you could not speak for yourself) and sharing other health care preferences is important BEFORE a possible health crisis. Please contact your Primary Care Provider to discuss Advance Care Planning. Preventing the Spread of Coronavirus Disease 2019 in Homes and Residential Communities  For the most recent information go to [email or web address]    Prevention steps for People with confirmed or suspected COVID-19 (including persons under investigation) who do not need to be hospitalized  and   People with confirmed COVID-19 who were hospitalized and determined to be medically stable to go home    Your healthcare provider and public health staff will evaluate whether you can be cared for at home. If it is determined that you do not need to be hospitalized and can be isolated at home, you will be monitored by staff from your local or state health department. You should follow the prevention steps below until a healthcare provider or local or state health department says you can return to your normal activities. Stay home except to get medical care  People who are mildly ill with COVID-19 are able to isolate at home during their illness.  You should restrict activities outside your home, except for getting medical care. Do not go to work, school, or public areas. Avoid using public transportation, ride-sharing, or taxis. Separate yourself from other people and animals in your home  People: As much as possible, you should stay in a specific room and away from other people in your home. Also, you should use a separate bathroom, if available. Animals: You should restrict contact with pets and other animals while you are sick with COVID-19, just like you would around other people. Although there have not been reports of pets or other animals becoming sick with COVID-19, it is still recommended that people sick with COVID-19 limit contact with animals until more information is known about the virus. When possible, have another member of your household care for your animals while you are sick. If you are sick with COVID-19, avoid contact with your pet, including petting, snuggling, being kissed or licked, and sharing food. If you must care for your pet or be around animals while you are sick, wash your hands before and after you interact with pets and wear a facemask. Call ahead before visiting your doctor  If you have a medical appointment, call the healthcare provider and tell them that you have or may have COVID-19. This will help the healthcare providers office take steps to keep other people from getting infected or exposed. Wear a facemask  You should wear a facemask when you are around other people (e.g., sharing a room or vehicle) or pets and before you enter a healthcare providers office. If you are not able to wear a facemask (for example, because it causes trouble breathing), then people who live with you should not stay in the same room with you, or they should wear a facemask if they enter your room. Cover your coughs and sneezes  Cover your mouth and nose with a tissue when you cough or sneeze. Throw used tissues in a lined trash can.  Immediately wash your hands with soap and water for at least 20 seconds or, if soap and water are not available, clean your hands with an alcohol-based hand  that contains at least 60% alcohol. Clean your hands often  Wash your hands often with soap and water for at least 20 seconds, especially after blowing your nose, coughing, or sneezing; going to the bathroom; and before eating or preparing food. If soap and water are not readily available, use an alcohol-based hand  with at least 60% alcohol, covering all surfaces of your hands and rubbing them together until they feel dry. Soap and water are the best option if hands are visibly dirty. Avoid touching your eyes, nose, and mouth with unwashed hands. Avoid sharing personal household items  You should not share dishes, drinking glasses, cups, eating utensils, towels, or bedding with other people or pets in your home. After using these items, they should be washed thoroughly with soap and water. Clean all high-touch surfaces everyday  High touch surfaces include counters, tabletops, doorknobs, bathroom fixtures, toilets, phones, keyboards, tablets, and bedside tables. Also, clean any surfaces that may have blood, stool, or body fluids on them. Use a household cleaning spray or wipe, according to the label instructions. Labels contain instructions for safe and effective use of the cleaning product including precautions you should take when applying the product, such as wearing gloves and making sure you have good ventilation during use of the product. Monitor your symptoms  Seek prompt medical attention if your illness is worsening (e.g., difficulty breathing). Before seeking care, call your healthcare provider and tell them that you have, or are being evaluated for, COVID-19. Put on a facemask before you enter the facility. These steps will help the healthcare providers office to keep other people in the office or waiting room from getting infected or exposed.  Ask your healthcare provider to call the local or state health department. Persons who are placed under active monitoring or facilitated self-monitoring should follow instructions provided by their local health department or occupational health professionals, as appropriate. When working with your local health department check their available hours. If you have a medical emergency and need to call 911, notify the dispatch personnel that you have, or are being evaluated for COVID-19. If possible, put on a facemask before emergency medical services arrive. Discontinuing home isolation  Patients with confirmed COVID-19 should remain under home isolation precautions until the risk of secondary transmission to others is thought to be low. The decision to discontinue home isolation precautions should be made on a case-by-case basis, in consultation with healthcare providers and state and local health departments.

## 2020-10-20 NOTE — H&P
Pre Operative History and Physical Exam    Patient ID:  Taylor Hewitt  [id number]  79 y.o.  1950    Today: October 20, 2020       Assessment:   1. Right ankle bimalleolar fx        Plan:    1. Proceed with scheduled Procedure(s) (LRB):  RIGHT ANKLE OPEN REDUCTION INTERNAL FIXATION CHOICE ANES (Right)            CC:  Right ankle pain    HPI:   The patient has  Right ankle closed fracture. The patient was evaluated and examined during a consultation prior to this office visit. There have been no changes to the patient's orthopedic condition since the initial consultation. The patient has failed previous conservative treatment for this condition.  The patient will present the day of surgery for Procedure(s) (LRB):  RIGHT ANKLE OPEN REDUCTION INTERNAL FIXATION CHOICE ANES (Right)      Past Medical/Surgical History:  Past Medical History:   Diagnosis Date    Acute myocardial infarction, subendocardial infarction, initial episode of care ([facility] 05/29/2014    NSTEMI-- stent x 1---2014-- followed by dr Erwin Chavarria Adverse effect of anesthesia     slow to wake up per pt    [facility]03/2017    followed by dr Roxy Patel CAD (coronary artery disease)     mi  2014--- stent x 1--- 2014--- followed by dr Ana Paula Dorman kidney disease, stage II (mild) 05/29/2014    pt denies--- followed by dr. Sundar Reid    Diabetes [facility])     type 2-- sqbs am avg 100-115--- no hypo    History of CVA (cerebrovascular accident) 03/03/2017    \" light\"-- 2017-- no residual    HLD (hyperlipidemia) 8/7/2015    PVC (premature ventricular contraction) 3/3/2017    Right ankle pain     r/t fall from ladder    Tobacco use disorder 5/29/2014     Past Surgical History:   Procedure Laterality Date    HX COLONOSCOPY  10/16/15    Dr. Manny Cuellar; colon polyp; repeat 5 yrs    HX CORONARY STENT PLACEMENT      HX ENDOSCOPY  10/16/15    Dr. Manny Cuellar; Grade A esophagitis    HX ORTHOPAEDIC  20 yrs ago    bilateral shoulder scope    ND LEFT HEART CATH,PERCUTANEOUS  5/29/2014    xience to mRCA        Allergies: Allergies   Allergen Reactions    Crestor [Rosuvastatin] Myalgia    Lipitor [Atorvastatin] Myalgia    Zocor [Simvastatin] Myalgia        Physical Exam:   General: NAD, Alert, Oriented, Appears their stated age     [de-identified]: NC/AT, PERRL    Skin: No rashes, lesions or wounds seen      Psych: normal affect      Heart: Regular Rate, Rhythm     Lungs: unlabored respirations, normal breath sounds     Abdomen: Soft and non-distended     Ortho:  Right ankle tenderness medial and lateral malleoli    Neuro: no focal defects, sensation is equal bilaterally     Lymph: no lymphadenopathy     Meds:   No current facility-administered medications for this encounter. Current Outpatient Medications   Medication Sig    aspirin 81 mg chewable tablet Take 81 mg by mouth daily.  ezetimibe (ZETIA) 10 mg tablet Take 1 Tab by mouth daily.  insulin degludec Kerri Moll FlexTouch U-100) 100 unit/mL (3 mL) inpn INJECT 34 UNITS SUBCUTANEOUSLY NIGHTLY    metFORMIN (GLUCOPHAGE) 500 mg tablet TAKE 1 TABLET BY MOUTH TWICE DAILY WITH MEALS    omeprazole (PRILOSEC) 20 mg capsule TAKE ONE CAPSULE BY MOUTH ONCE DAILY 30 MINUTES BEFORE A MEAL    sildenafil citrate (VIAGRA) 100 mg tablet TAKE 1 TABLET BY MOUTH  AS NEEDED    [START ON 1/21/2021] varicella-zoster recombinant, PF, (Shingrix, PF,) 50 mcg/0.5 mL susr injection 0.5mL by IntraMUSCular route once now and then repeat in 2-6 months    valsartan (DIOVAN) 160 mg tablet Take 1 Tab by mouth daily. Labs:  Orders Only on 10/16/2020   Component Date Value Ref Range Status    SARS-CoV-2, TASIA 10/16/2020 Not Detected  Not Detected Final    Comment: This nucleic acid amplification test was developed and its performance  characteristics determined by [employer]. Nucleic acid  amplification tests include PCR and TMA. This test has not been FDA  cleared or approved.  This test has been authorized by FDA under an  Emergency Use Authorization (EUA). This test is only authorized for  the duration of time the declaration that circumstances exist  justifying the authorization of the emergency use of in vitro  diagnostic tests for detection of SARS-CoV-2 virus and/or diagnosis  of COVID-19 infection under section 564(b)(1) of the Act, 21 U. S.C.  207VLU-0(U) (1), unless the authorization is terminated or revoked  sooner. When diagnostic testing is negative, the possibility of a false  negative result should be considered in the context of a patient's  recent exposures and the presence of clinical signs and symptoms  consistent with COVID-19. An individual without symptoms of COVID-19  and who is not shedding SARS-CoV-2 virus would                            expect to have a  negative (not detected) result in this assay.       Inpatient 10/16/2020 Comment   Final    Received   Office Visit on 09/21/2020   Component Date Value Ref Range Status    Color (UA POC) 09/21/2020 Light Yellow   Final    Clarity (UA POC) 09/21/2020 Clear   Final    Glucose (UA POC) 09/21/2020 Negative  Negative Final    Bilirubin (UA POC) 09/21/2020 Negative  Negative Final    Ketones (UA POC) 09/21/2020 Negative  Negative Final    Specific gravity (UA POC) 09/21/2020 1.005  1.001 - 1.035 Final    Blood (UA POC) 09/21/2020 1+  Negative Final    pH (UA POC) 09/21/2020 5.0  4.6 - 8.0 Final    Protein (UA POC) 09/21/2020 1+  Negative Final    Urobilinogen (UA POC) 09/21/2020 0.2 mg/dL  0.2 - 1 Final    Nitrites (UA POC) 09/21/2020 Negative  Negative Final    Leukocyte esterase (UA POC) 09/21/2020 Trace  Negative Final    Creatinine, urine 09/21/2020 45.3  Not Estab. mg/dL Final    Microalbumin, urine 09/21/2020 68.1  Not Estab. ug/mL Final    Microalb/Creat ratio (ug/mg creat.) 09/21/2020 150* 0 - 29 mg/g creat Final    Comment:                        Normal:                0 -  29 Moderately increased: 30 - 300                         Severely increased:       >300                **Please note reference interval change**      Prostate Specific Ag 09/21/2020 7.5* 0.0 - 4.0 ng/mL Final    Comment: Roche ECLIA methodology. According to the American Urological Association, Serum PSA should  decrease and remain at undetectable levels after radical  prostatectomy. The AUA defines biochemical recurrence as an initial  PSA value 0.2 ng/mL or greater followed by a subsequent confirmatory  PSA value 0.2 ng/mL or greater. Values obtained with different assay methods or kits cannot be used  interchangeably. Results cannot be interpreted as absolute evidence  of the presence or absence of malignant disease.  Hemoglobin A1c 09/21/2020 7.8* 4.8 - 5.6 % Final    Comment:          Prediabetes: 5.7 - 6.4           Diabetes: >6.4           Glycemic control for adults with diabetes: <7.0      Estimated average glucose 09/21/2020 177  mg/dL Final    Urine Culture, Routine 09/21/2020    Final                    Value:Culture shows less than 10,000 colony forming units of bacteria per  milliliter of urine. This colony count is not generally considered  to be clinically significant.                    Patient Active Problem List   Diagnosis Code    Chronic kidney disease, stage II (mild) N18.2    Essential hypertension I10    Dyslipidemia E78.5    Atherosclerosis of native coronary artery of native heart without angina pectoris I25.10    Syncope R55    Chronic diastolic congestive heart failure (HCC) I50.32    History of CVA (cerebrovascular accident) Z86.73    PVC (premature ventricular contraction) I49.3    Bilateral carotid artery disease (HCC) I77.9    Controlled type 2 diabetes mellitus with stage 2 chronic kidney disease, with long-term current use of insulin ([facility]) E11.22, N18.2, Z79.4    Gastroesophageal reflux disease without esophagitis K21.9    Primary osteoarthritis of left knee M17.12    Statin intolerance Z78.9    Erectile dysfunction N52.9         Signed By: James Lau MD  October 20, 2020    No interval change 10/20/20 1004

## 2020-10-20 NOTE — ANESTHESIA PROCEDURE NOTES
Peripheral Block    Start time: 10/20/2020 10:07 AM  End time: 10/20/2020 10:08 AM  Performed by: Jessica Wheeler MD  Authorized by: Jessica Wheeler MD       Pre-procedure: Indications: at surgeon's request and post-op pain management    Preanesthetic Checklist: patient identified, risks and benefits discussed, site marked, timeout performed, anesthesia consent given and patient being monitored    Timeout Time: 10:07          Block Type:   Block Type:   Adductor canal  Laterality:  Right  Monitoring:  Standard ASA monitoring, continuous pulse ox, heart rate, responsive to questions, oxygen and frequent vital sign checks  Injection Technique:  Single shot  Procedures: ultrasound guided    Patient Position: supine  Prep: chlorhexidine    Location:  Mid thigh  Needle Type:  Stimuplex  Needle Gauge:  20 G  Needle Localization:  Ultrasound guidance  Medication Injected:  Ropivacaine (PF) (NAROPIN)(0.5%) 5 mg/mL injection, 15 mL  dexamethasone (DECADRON) 4 mg/mL injection, 4 mg  Med Admin Time: 10/20/2020 10:08 AM    Assessment:  Number of attempts:  1  Injection Assessment:  Incremental injection every 5 mL, local visualized surrounding nerve on ultrasound, negative aspiration for blood, no intravascular symptoms, no paresthesia and ultrasound image on chart  Patient tolerance:  Patient tolerated the procedure well with no immediate complications

## 2020-10-20 NOTE — ANESTHESIA PROCEDURE NOTES
Peripheral Block    Start time: 10/20/2020 10:05 AM  End time: 10/20/2020 10:07 AM  Performed by: Carroll Bowen MD  Authorized by: Carroll Bowen MD       Pre-procedure:    Indications: at surgeon's request and post-op pain management    Preanesthetic Checklist: patient identified, risks and benefits discussed, site marked, timeout performed, anesthesia consent given and patient being monitored    Timeout Time: 10:05          Block Type:   Block Type:  Popliteal  Laterality:  Right  Monitoring:  Standard ASA monitoring, continuous pulse ox, heart rate, oxygen, responsive to questions and frequent vital sign checks  Injection Technique:  Single shot  Procedures: ultrasound guided    Patient Position: supine  Prep: chlorhexidine    Location:  Lower thigh  Needle Type:  Stimuplex  Needle Gauge:  20 G  Needle Localization:  Ultrasound guidance  Medication Injected:  Ropivacaine (PF) (NAROPIN)(0.5%) 5 mg/mL injection, 25 mL  dexamethasone (DECADRON) 4 mg/mL injection, 4 mg  Med Admin Time: 10/20/2020 10:06 AM    Assessment:  Number of attempts:  1  Injection Assessment:  Incremental injection every 5 mL, local visualized surrounding nerve on ultrasound, negative aspiration for blood, no paresthesia, ultrasound image on chart and no intravascular symptoms  Patient tolerance:  Patient tolerated the procedure well with no immediate complications

## 2020-10-20 NOTE — ANESTHESIA PREPROCEDURE EVALUATION
Relevant Problems   No relevant active problems       Anesthetic History   No history of anesthetic complications            Review of Systems / Medical History  Patient summary reviewed and pertinent labs reviewed    Pulmonary    COPD: mild      Smoker         Neuro/Psych       CVA (mild - no residual symptoms)      Comments: Carotid stenosis awaiting CEA Cardiovascular    Hypertension        Dysrhythmias : PVC  CAD, PAD, cardiac stents (roughly 7 yrs ago - remains on bASA) and hyperlipidemia    Exercise tolerance[de-identified] Prior to injury was active without chest pain, SOB, syncope      GI/Hepatic/Renal     GERD: well controlled           Endo/Other    Diabetes: well controlled    Arthritis     Other Findings            Physical Exam    Airway  Mallampati: II  TM Distance: 4 - 6 cm  Neck ROM: normal range of motion   Mouth opening: Normal     Cardiovascular    Rhythm: regular  Rate: normal      Pertinent negatives: No murmur and peripheral edema   Dental    Dentition: Full upper dentures     Pulmonary  Breath sounds clear to auscultation               Abdominal  GI exam deferred       Other Findings            Anesthetic Plan    ASA: 3  Anesthesia type: total IV anesthesia      Post-op pain plan if not by surgeon: peripheral nerve block single    Induction: Intravenous  Anesthetic plan and risks discussed with: Patient and Spouse

## 2020-10-20 NOTE — OP NOTES
[facility]  OPERATIVE REPORT    Name:  Mary Pride  MR#:  [id number]  :  1950  ACCOUNT #:  [de-identified]  DATE OF SERVICE:  10/20/2020    PREOPERATIVE DIAGNOSIS:  Atypical right bimalleolar fracture. POSTOPERATIVE DIAGNOSIS:  Atypical right bimalleolar fracture. PROCEDURE PERFORMED:  Open reduction and internal fixation, atypical bimalleolar ankle fracture. SURGEON:  Rafi Hare MD    ASSISTANT:  None    ANESTHESIA:  Regional.    COMPLICATIONS:  None. SPECIMENS REMOVED:  None. IMPLANTS:  Synthes lateral malleolus plate and screw construct with medial malleolar hook plate and screw construct. ESTIMATED BLOOD LOSS:  Minimal.    PROCEDURE IN DETAIL:  After discussion of risks, benefits, and alternatives, the patient expressed understanding and strongly desired to proceed with surgical treatment. He was brought to the operating room. Regional anesthesia was verified. The right lower extremity was prepped and draped in normal sterile fashion. Time-out was then performed. A lateral incision was created over lateral malleolus. Dissection was carried out down to the level of the lateral malleolus fracture. He had a very distal lateral malleolus fracture that was extensively comminuted. Because of this, periosteum was left intact over the fracture because I did not think interfragmentary technique was going to be possible given the degree of comminution. Accordingly with periosteum intact, a lateral malleolus plate was on over the fracture and contoured to fit it well. A nonlocking screw was placed proximally in order to provide a buttress. This was performed with the malleolus in a reduced position indirectly. The mortise was well restored. Bone quality was poor at that level and so a second screw was placed in a syndesmotic fashion. The syndesmosis was not disrupted by the fracture, but this was used to obtain improved purchase.   A locking distal fragment fixation was then placed. Prior to placement of the plate, the distal aspect of the head to be contoured somewhat to optimize its fit. The patient has relatively atrophic skin and peripheral vascular disease and I was concerned regarding the possibility of hardware prominence and even dehiscence. Additional locking proximal fragment fixation was then placed as well and attention was turned to the medial malleolar fragment. There is a very small medial malleolar fragment. This was dissected and a hook plate was employed. The distal turns of the hook plate were used to engage the small medial malleolar fragment. The medial malleolar fragment was somewhat anterior to the plate, had to be oriented in an anterior to posterior direction obliquely. We then impacted and noted to stabilize the fracture very well. Proximal fixation was then obtained in a nonlocked mode in order to stabilize this. The wound was copiously irrigated. Layered wound closure was completed. Far-near-near-far sutures were used to minimize skin tension at the skin edges given the patient's atrophic skin and peripheral vascular disease. Sterile dressings were applied as well as a posterior U splint. The patient tolerated the procedure well. There were no complications. The specimens were sent to pathology. POSTOPERATIVE PLAN:  Strict nonweightbearing and tobacco avoidance. He will increase his baseline aspirin to twice daily for DVT prophylaxis. Follow up in 2 weeks for wound check and possible suture removal.        Malini Smalls MD      [id number]  D:  10/20/2020 13:39  T:  10/20/2020 17:44  JOB #:  [id number]  CC:   Yina Tony MD

## 2020-10-20 NOTE — ANESTHESIA POSTPROCEDURE EVALUATION
Procedure(s):  RIGHT ANKLE OPEN REDUCTION INTERNAL FIXATION .     total IV anesthesia    Anesthesia Post Evaluation      Multimodal analgesia: multimodal analgesia used between 6 hours prior to anesthesia start to PACU discharge  Patient location during evaluation: PACU  Patient participation: complete - patient participated  Level of consciousness: awake and alert  Pain management: adequate  Airway patency: patent  Anesthetic complications: no  Cardiovascular status: acceptable  Respiratory status: acceptable  Hydration status: acceptable  Post anesthesia nausea and vomiting:  controlled  Final Post Anesthesia Temperature Assessment:  Normothermia (36.0-37.5 degrees C)      INITIAL Post-op Vital signs:   Vitals Value Taken Time   BP 86/53 10/20/2020  2:11 PM   Temp 36.3 °C (97.3 °F) 10/20/2020  2:04 PM   Pulse 66 10/20/2020  2:11 PM   Resp 16 10/20/2020  2:11 PM   SpO2 91 % 10/20/2020  2:11 PM

## 2021-02-02 ENCOUNTER — HOSPITAL ENCOUNTER (OUTPATIENT)
Dept: CT IMAGING | Age: 71
Discharge: HOME | End: 2021-02-02
Attending: NURSE PRACTITIONER
Payer: MEDICARE

## 2021-02-02 DIAGNOSIS — I65.23 BILATERAL CAROTID ARTERY STENOSIS: ICD-10-CM

## 2021-02-02 LAB — CREAT BLD-MCNC: 1.3 MG/DL (ref 0.8–1.5)

## 2021-02-02 PROCEDURE — 74011000636 HC RX REV CODE- 636: Performed by: NURSE PRACTITIONER

## 2021-02-02 PROCEDURE — 70498 CT ANGIOGRAPHY NECK: CPT

## 2021-02-02 PROCEDURE — 74011000258 HC RX REV CODE- 258: Performed by: NURSE PRACTITIONER

## 2021-02-02 PROCEDURE — 82565 ASSAY OF CREATININE: CPT

## 2021-02-02 RX ORDER — SODIUM CHLORIDE 0.9 % (FLUSH) 0.9 %
10 SYRINGE (ML) INJECTION
Status: COMPLETED | OUTPATIENT
Start: 2021-02-02 | End: 2021-02-02

## 2021-02-02 RX ADMIN — IOPAMIDOL 50 ML: 755 INJECTION, SOLUTION INTRAVENOUS at 07:46

## 2021-02-02 RX ADMIN — SODIUM CHLORIDE 100 ML: 900 INJECTION, SOLUTION INTRAVENOUS at 07:46

## 2021-02-02 RX ADMIN — Medication 10 ML: at 07:46

## 2021-04-24 ENCOUNTER — HOSPITAL ENCOUNTER (INPATIENT)
Age: 71
LOS: 2 days | Discharge: HOME | DRG: 246 | End: 2021-04-26
Admitting: INTERNAL MEDICINE
Payer: MEDICARE

## 2021-04-24 ENCOUNTER — APPOINTMENT (OUTPATIENT)
Dept: GENERAL RADIOLOGY | Age: 71
DRG: 246 | End: 2021-04-24
Payer: MEDICARE

## 2021-04-24 DIAGNOSIS — I25.10 ATHEROSCLEROSIS OF NATIVE CORONARY ARTERY OF NATIVE HEART WITHOUT ANGINA PECTORIS: ICD-10-CM

## 2021-04-24 DIAGNOSIS — I24.9 ACS (ACUTE CORONARY SYNDROME) (HCC): ICD-10-CM

## 2021-04-24 PROBLEM — I21.4 NSTEMI (NON-ST ELEVATED MYOCARDIAL INFARCTION) (HCC): Status: ACTIVE | Noted: 2021-04-24

## 2021-04-24 LAB
ALBUMIN SERPL-MCNC: 3.8 G/DL (ref 3.2–4.6)
ALBUMIN/GLOB SERPL: 1.1 {RATIO} (ref 1.2–3.5)
ALP SERPL-CCNC: 88 U/L (ref 50–136)
ALT SERPL-CCNC: 32 U/L (ref 12–65)
ANION GAP SERPL CALC-SCNC: 11 MMOL/L (ref 7–16)
APTT PPP: 30.1 SEC (ref 24.1–35.1)
AST SERPL-CCNC: 60 U/L (ref 15–37)
ATRIAL RATE: 65 BPM
ATRIAL RATE: 87 BPM
ATRIAL RATE: 92 BPM
BASOPHILS # BLD: 0.1 K/UL (ref 0–0.2)
BASOPHILS NFR BLD: 1 % (ref 0–2)
BILIRUB SERPL-MCNC: 0.7 MG/DL (ref 0.2–1.1)
BUN SERPL-MCNC: 20 MG/DL (ref 8–23)
CALCIUM SERPL-MCNC: 9.3 MG/DL (ref 8.3–10.4)
CALCULATED P AXIS, ECG09: 50 DEGREES
CALCULATED P AXIS, ECG09: 53 DEGREES
CALCULATED P AXIS, ECG09: 64 DEGREES
CALCULATED R AXIS, ECG10: 52 DEGREES
CALCULATED R AXIS, ECG10: 63 DEGREES
CALCULATED R AXIS, ECG10: 69 DEGREES
CALCULATED T AXIS, ECG11: -5 DEGREES
CALCULATED T AXIS, ECG11: 17 DEGREES
CALCULATED T AXIS, ECG11: 79 DEGREES
CHLORIDE SERPL-SCNC: 98 MMOL/L (ref 98–107)
CHOLEST SERPL-MCNC: 116 MG/DL
CO2 SERPL-SCNC: 24 MMOL/L (ref 21–32)
CREAT SERPL-MCNC: 1.1 MG/DL (ref 0.8–1.5)
DIAGNOSIS, 93000: NORMAL
DIFFERENTIAL METHOD BLD: ABNORMAL
EOSINOPHIL # BLD: 0.1 K/UL (ref 0–0.8)
EOSINOPHIL NFR BLD: 1 % (ref 0.5–7.8)
ERYTHROCYTE [DISTWIDTH] IN BLOOD BY AUTOMATED COUNT: 11.7 % (ref 11.9–14.6)
GLOBULIN SER CALC-MCNC: 3.5 G/DL (ref 2.3–3.5)
GLUCOSE BLD STRIP.AUTO-MCNC: 116 MG/DL (ref 65–100)
GLUCOSE BLD STRIP.AUTO-MCNC: 161 MG/DL (ref 65–100)
GLUCOSE SERPL-MCNC: 133 MG/DL (ref 65–100)
HCT VFR BLD AUTO: 44.8 % (ref 41.1–50.3)
HDLC SERPL-MCNC: 38 MG/DL (ref 40–60)
HDLC SERPL: 3.1 {RATIO}
HGB BLD-MCNC: 15.8 G/DL (ref 13.6–17.2)
IMM GRANULOCYTES # BLD AUTO: 0 K/UL (ref 0–0.5)
IMM GRANULOCYTES NFR BLD AUTO: 0 % (ref 0–5)
LDLC SERPL CALC-MCNC: 57.6 MG/DL
LIPASE SERPL-CCNC: 72 U/L (ref 73–393)
LIPID PROFILE,FLP: ABNORMAL
LYMPHOCYTES # BLD: 3.1 K/UL (ref 0.5–4.6)
LYMPHOCYTES NFR BLD: 25 % (ref 13–44)
MAGNESIUM SERPL-MCNC: 2.1 MG/DL (ref 1.8–2.4)
MCH RBC QN AUTO: 33.7 PG (ref 26.1–32.9)
MCHC RBC AUTO-ENTMCNC: 35.3 G/DL (ref 31.4–35)
MCV RBC AUTO: 95.5 FL (ref 79.6–97.8)
MONOCYTES # BLD: 0.8 K/UL (ref 0.1–1.3)
MONOCYTES NFR BLD: 7 % (ref 4–12)
NEUTS SEG # BLD: 8.1 K/UL (ref 1.7–8.2)
NEUTS SEG NFR BLD: 66 % (ref 43–78)
NRBC # BLD: 0 K/UL (ref 0–0.2)
P-R INTERVAL, ECG05: 232 MS
P-R INTERVAL, ECG05: 234 MS
P-R INTERVAL, ECG05: 240 MS
PLATELET # BLD AUTO: 309 K/UL (ref 150–450)
PMV BLD AUTO: 8.7 FL (ref 9.4–12.3)
POTASSIUM SERPL-SCNC: 3.6 MMOL/L (ref 3.5–5.1)
PROT SERPL-MCNC: 7.3 G/DL (ref 6.3–8.2)
Q-T INTERVAL, ECG07: 370 MS
Q-T INTERVAL, ECG07: 374 MS
Q-T INTERVAL, ECG07: 402 MS
QRS DURATION, ECG06: 106 MS
QRS DURATION, ECG06: 106 MS
QRS DURATION, ECG06: 112 MS
QTC CALCULATION (BEZET), ECG08: 417 MS
QTC CALCULATION (BEZET), ECG08: 418 MS
QTC CALCULATION (BEZET), ECG08: 457 MS
RBC # BLD AUTO: 4.69 M/UL (ref 4.23–5.6)
SERVICE CMNT-IMP: ABNORMAL
SERVICE CMNT-IMP: ABNORMAL
SODIUM SERPL-SCNC: 133 MMOL/L (ref 136–145)
T4 FREE SERPL-MCNC: 1 NG/DL (ref 0.78–1.46)
TRIGL SERPL-MCNC: 102 MG/DL (ref 35–150)
TROPONIN-HIGH SENSITIVITY: 2816.6 PG/ML (ref 0–14)
TROPONIN-HIGH SENSITIVITY: ABNORMAL PG/ML (ref 0–14)
TSH SERPL DL<=0.005 MIU/L-ACNC: 1.02 UIU/ML (ref 0.36–3.74)
VENTRICULAR RATE, ECG03: 65 BPM
VENTRICULAR RATE, ECG03: 75 BPM
VENTRICULAR RATE, ECG03: 92 BPM
VLDLC SERPL CALC-MCNC: 20.4 MG/DL (ref 6–23)
WBC # BLD AUTO: 12.1 K/UL (ref 4.3–11.1)

## 2021-04-24 PROCEDURE — 83735 ASSAY OF MAGNESIUM: CPT

## 2021-04-24 PROCEDURE — C1769 GUIDE WIRE: HCPCS

## 2021-04-24 PROCEDURE — 74011250637 HC RX REV CODE- 250/637: Performed by: NURSE PRACTITIONER

## 2021-04-24 PROCEDURE — 84443 ASSAY THYROID STIM HORMONE: CPT

## 2021-04-24 PROCEDURE — 99285 EMERGENCY DEPT VISIT HI MDM: CPT

## 2021-04-24 PROCEDURE — 74011250636 HC RX REV CODE- 250/636: Performed by: NURSE PRACTITIONER

## 2021-04-24 PROCEDURE — 77030042317 HC BND COMPR HEMSTAT -B

## 2021-04-24 PROCEDURE — 99152 MOD SED SAME PHYS/QHP 5/>YRS: CPT | Performed by: INTERNAL MEDICINE

## 2021-04-24 PROCEDURE — 77030015766

## 2021-04-24 PROCEDURE — 80061 LIPID PANEL: CPT

## 2021-04-24 PROCEDURE — 027037Z DILATION OF CORONARY ARTERY, ONE ARTERY WITH FOUR OR MORE DRUG-ELUTING INTRALUMINAL DEVICES, PERCUTANEOUS APPROACH: ICD-10-PCS | Performed by: INTERNAL MEDICINE

## 2021-04-24 PROCEDURE — 83690 ASSAY OF LIPASE: CPT

## 2021-04-24 PROCEDURE — 77030012468 HC VLV BLEEDBK CNTRL ABBT -B

## 2021-04-24 PROCEDURE — 93005 ELECTROCARDIOGRAM TRACING: CPT

## 2021-04-24 PROCEDURE — 93458 L HRT ARTERY/VENTRICLE ANGIO: CPT | Performed by: INTERNAL MEDICINE

## 2021-04-24 PROCEDURE — C1725 CATH, TRANSLUMIN NON-LASER: HCPCS

## 2021-04-24 PROCEDURE — 74011250636 HC RX REV CODE- 250/636

## 2021-04-24 PROCEDURE — 93458 L HRT ARTERY/VENTRICLE ANGIO: CPT

## 2021-04-24 PROCEDURE — 96365 THER/PROPH/DIAG IV INF INIT: CPT

## 2021-04-24 PROCEDURE — 4A023N7 MEASUREMENT OF CARDIAC SAMPLING AND PRESSURE, LEFT HEART, PERCUTANEOUS APPROACH: ICD-10-PCS | Performed by: INTERNAL MEDICINE

## 2021-04-24 PROCEDURE — C1887 CATHETER, GUIDING: HCPCS

## 2021-04-24 PROCEDURE — 85730 THROMBOPLASTIN TIME PARTIAL: CPT

## 2021-04-24 PROCEDURE — C8929 TTE W OR WO FOL WCON,DOPPLER: HCPCS

## 2021-04-24 PROCEDURE — 82962 GLUCOSE BLOOD TEST: CPT

## 2021-04-24 PROCEDURE — C1874 STENT, COATED/COV W/DEL SYS: HCPCS

## 2021-04-24 PROCEDURE — 74011000636 HC RX REV CODE- 636: Performed by: INTERNAL MEDICINE

## 2021-04-24 PROCEDURE — 74011250637 HC RX REV CODE- 250/637: Performed by: INTERNAL MEDICINE

## 2021-04-24 PROCEDURE — 65610000006 HC RM INTENSIVE CARE

## 2021-04-24 PROCEDURE — 96375 TX/PRO/DX INJ NEW DRUG ADDON: CPT

## 2021-04-24 PROCEDURE — 74011000258 HC RX REV CODE- 258: Performed by: INTERNAL MEDICINE

## 2021-04-24 PROCEDURE — 92941 PRQ TRLML REVSC TOT OCCL AMI: CPT | Performed by: INTERNAL MEDICINE

## 2021-04-24 PROCEDURE — 84484 ASSAY OF TROPONIN QUANT: CPT

## 2021-04-24 PROCEDURE — 93005 ELECTROCARDIOGRAM TRACING: CPT | Performed by: NURSE PRACTITIONER

## 2021-04-24 PROCEDURE — 74011000250 HC RX REV CODE- 250: Performed by: INTERNAL MEDICINE

## 2021-04-24 PROCEDURE — 74011636637 HC RX REV CODE- 636/637: Performed by: NURSE PRACTITIONER

## 2021-04-24 PROCEDURE — 92941 PRQ TRLML REVSC TOT OCCL AMI: CPT

## 2021-04-24 PROCEDURE — 2709999900 HC NON-CHARGEABLE SUPPLY

## 2021-04-24 PROCEDURE — 84439 ASSAY OF FREE THYROXINE: CPT

## 2021-04-24 PROCEDURE — 77030016699 HC CATH ANGI DX INFN1 CARD -A

## 2021-04-24 PROCEDURE — 80053 COMPREHEN METABOLIC PANEL: CPT

## 2021-04-24 PROCEDURE — B2111ZZ FLUOROSCOPY OF MULTIPLE CORONARY ARTERIES USING LOW OSMOLAR CONTRAST: ICD-10-PCS | Performed by: INTERNAL MEDICINE

## 2021-04-24 PROCEDURE — 85025 COMPLETE CBC W/AUTO DIFF WBC: CPT

## 2021-04-24 PROCEDURE — C1894 INTRO/SHEATH, NON-LASER: HCPCS

## 2021-04-24 PROCEDURE — 96367 TX/PROPH/DG ADDL SEQ IV INF: CPT

## 2021-04-24 PROCEDURE — 99223 1ST HOSP IP/OBS HIGH 75: CPT | Performed by: INTERNAL MEDICINE

## 2021-04-24 PROCEDURE — B2151ZZ FLUOROSCOPY OF LEFT HEART USING LOW OSMOLAR CONTRAST: ICD-10-PCS | Performed by: INTERNAL MEDICINE

## 2021-04-24 PROCEDURE — 71045 X-RAY EXAM CHEST 1 VIEW: CPT

## 2021-04-24 PROCEDURE — 74011250636 HC RX REV CODE- 250/636: Performed by: INTERNAL MEDICINE

## 2021-04-24 RX ORDER — GUAIFENESIN 100 MG/5ML
81 LIQUID (ML) ORAL EVERY 12 HOURS
Status: DISCONTINUED | OUTPATIENT
Start: 2021-04-24 | End: 2021-04-24

## 2021-04-24 RX ORDER — MIDAZOLAM HYDROCHLORIDE 1 MG/ML
.5-2 INJECTION, SOLUTION INTRAMUSCULAR; INTRAVENOUS
Status: DISCONTINUED | OUTPATIENT
Start: 2021-04-24 | End: 2021-04-24

## 2021-04-24 RX ORDER — INSULIN LISPRO 100 [IU]/ML
INJECTION, SOLUTION INTRAVENOUS; SUBCUTANEOUS
Status: DISCONTINUED | OUTPATIENT
Start: 2021-04-24 | End: 2021-04-26 | Stop reason: HOSPADM

## 2021-04-24 RX ORDER — HYDROCODONE BITARTRATE AND ACETAMINOPHEN 5; 325 MG/1; MG/1
1 TABLET ORAL
Status: DISCONTINUED | OUTPATIENT
Start: 2021-04-24 | End: 2021-04-26 | Stop reason: HOSPADM

## 2021-04-24 RX ORDER — NITROGLYCERIN 0.4 MG/1
0.4 TABLET SUBLINGUAL
Status: DISCONTINUED | OUTPATIENT
Start: 2021-04-24 | End: 2021-04-26 | Stop reason: HOSPADM

## 2021-04-24 RX ORDER — ONDANSETRON 2 MG/ML
4 INJECTION INTRAMUSCULAR; INTRAVENOUS
Status: COMPLETED | OUTPATIENT
Start: 2021-04-24 | End: 2021-04-24

## 2021-04-24 RX ORDER — ONDANSETRON 2 MG/ML
4 INJECTION INTRAMUSCULAR; INTRAVENOUS
Status: DISCONTINUED | OUTPATIENT
Start: 2021-04-24 | End: 2021-04-26 | Stop reason: HOSPADM

## 2021-04-24 RX ORDER — ACETAMINOPHEN 325 MG/1
650 TABLET ORAL
Status: DISCONTINUED | OUTPATIENT
Start: 2021-04-24 | End: 2021-04-26 | Stop reason: HOSPADM

## 2021-04-24 RX ORDER — PRAVASTATIN SODIUM 20 MG/1
40 TABLET ORAL
Status: DISCONTINUED | OUTPATIENT
Start: 2021-04-24 | End: 2021-04-26 | Stop reason: HOSPADM

## 2021-04-24 RX ORDER — HEPARIN SODIUM 5000 [USP'U]/100ML
12-25 INJECTION, SOLUTION INTRAVENOUS
Status: DISCONTINUED | OUTPATIENT
Start: 2021-04-24 | End: 2021-04-24

## 2021-04-24 RX ORDER — HEPARIN SODIUM 5000 [USP'U]/ML
4000 INJECTION, SOLUTION INTRAVENOUS; SUBCUTANEOUS
Status: COMPLETED | OUTPATIENT
Start: 2021-04-24 | End: 2021-04-24

## 2021-04-24 RX ORDER — HEPARIN SODIUM 200 [USP'U]/100ML
2 INJECTION, SOLUTION INTRAVENOUS CONTINUOUS
Status: DISCONTINUED | OUTPATIENT
Start: 2021-04-24 | End: 2021-04-24

## 2021-04-24 RX ORDER — SODIUM CHLORIDE 9 MG/ML
75 INJECTION, SOLUTION INTRAVENOUS CONTINUOUS
Status: DISCONTINUED | OUTPATIENT
Start: 2021-04-24 | End: 2021-04-26 | Stop reason: HOSPADM

## 2021-04-24 RX ORDER — INSULIN GLARGINE 100 [IU]/ML
34 INJECTION, SOLUTION SUBCUTANEOUS
Status: DISCONTINUED | OUTPATIENT
Start: 2021-04-24 | End: 2021-04-26 | Stop reason: HOSPADM

## 2021-04-24 RX ORDER — NITROGLYCERIN 0.4 MG/1
0.4 TABLET SUBLINGUAL
Status: DISCONTINUED | OUTPATIENT
Start: 2021-04-24 | End: 2021-04-24

## 2021-04-24 RX ORDER — METOPROLOL TARTRATE 25 MG/1
25 TABLET, FILM COATED ORAL EVERY 12 HOURS
Status: DISCONTINUED | OUTPATIENT
Start: 2021-04-24 | End: 2021-04-26 | Stop reason: HOSPADM

## 2021-04-24 RX ORDER — VALSARTAN 160 MG/1
320 TABLET ORAL DAILY
Status: DISCONTINUED | OUTPATIENT
Start: 2021-04-24 | End: 2021-04-26 | Stop reason: HOSPADM

## 2021-04-24 RX ORDER — SODIUM CHLORIDE 0.9 % (FLUSH) 0.9 %
5-40 SYRINGE (ML) INJECTION EVERY 8 HOURS
Status: DISCONTINUED | OUTPATIENT
Start: 2021-04-24 | End: 2021-04-26 | Stop reason: HOSPADM

## 2021-04-24 RX ORDER — GUAIFENESIN 100 MG/5ML
81 LIQUID (ML) ORAL DAILY
Status: DISCONTINUED | OUTPATIENT
Start: 2021-04-25 | End: 2021-04-26 | Stop reason: HOSPADM

## 2021-04-24 RX ORDER — MAG HYDROX/ALUMINUM HYD/SIMETH 200-200-20
30 SUSPENSION, ORAL (FINAL DOSE FORM) ORAL
Status: DISCONTINUED | OUTPATIENT
Start: 2021-04-24 | End: 2021-04-26 | Stop reason: HOSPADM

## 2021-04-24 RX ORDER — PANTOPRAZOLE SODIUM 40 MG/1
40 TABLET, DELAYED RELEASE ORAL
Status: DISCONTINUED | OUTPATIENT
Start: 2021-04-24 | End: 2021-04-26 | Stop reason: HOSPADM

## 2021-04-24 RX ORDER — LIDOCAINE HYDROCHLORIDE 10 MG/ML
1-20 INJECTION, SOLUTION EPIDURAL; INFILTRATION; INTRACAUDAL; PERINEURAL ONCE
Status: COMPLETED | OUTPATIENT
Start: 2021-04-24 | End: 2021-04-24

## 2021-04-24 RX ORDER — DOCUSATE SODIUM 100 MG/1
100 CAPSULE, LIQUID FILLED ORAL
Status: DISCONTINUED | OUTPATIENT
Start: 2021-04-24 | End: 2021-04-26 | Stop reason: HOSPADM

## 2021-04-24 RX ORDER — MORPHINE SULFATE 2 MG/ML
2 INJECTION, SOLUTION INTRAMUSCULAR; INTRAVENOUS
Status: DISCONTINUED | OUTPATIENT
Start: 2021-04-24 | End: 2021-04-26 | Stop reason: HOSPADM

## 2021-04-24 RX ORDER — EZETIMIBE 10 MG/1
10 TABLET ORAL DAILY
Status: DISCONTINUED | OUTPATIENT
Start: 2021-04-24 | End: 2021-04-26 | Stop reason: HOSPADM

## 2021-04-24 RX ORDER — DIPHENHYDRAMINE HCL 25 MG
25 CAPSULE ORAL
Status: DISCONTINUED | OUTPATIENT
Start: 2021-04-24 | End: 2021-04-26 | Stop reason: HOSPADM

## 2021-04-24 RX ORDER — MORPHINE SULFATE 4 MG/ML
4 INJECTION INTRAVENOUS
Status: COMPLETED | OUTPATIENT
Start: 2021-04-24 | End: 2021-04-24

## 2021-04-24 RX ORDER — SODIUM CHLORIDE 0.9 % (FLUSH) 0.9 %
5-40 SYRINGE (ML) INJECTION AS NEEDED
Status: DISCONTINUED | OUTPATIENT
Start: 2021-04-24 | End: 2021-04-26 | Stop reason: HOSPADM

## 2021-04-24 RX ADMIN — INSULIN GLARGINE 34 UNITS: 100 INJECTION, SOLUTION SUBCUTANEOUS at 22:30

## 2021-04-24 RX ADMIN — TICAGRELOR 180 MG: 90 TABLET ORAL at 06:05

## 2021-04-24 RX ADMIN — IOPAMIDOL 170 ML: 755 INJECTION, SOLUTION INTRAVENOUS at 06:06

## 2021-04-24 RX ADMIN — BIVALIRUDIN 1.75 MG/KG/HR: 250 INJECTION, POWDER, LYOPHILIZED, FOR SOLUTION INTRAVENOUS at 05:24

## 2021-04-24 RX ADMIN — METOPROLOL TARTRATE 25 MG: 25 TABLET, FILM COATED ORAL at 22:14

## 2021-04-24 RX ADMIN — ACETAMINOPHEN 650 MG: 325 TABLET, FILM COATED ORAL at 13:32

## 2021-04-24 RX ADMIN — PERFLUTREN 1 ML: 6.52 INJECTION, SUSPENSION INTRAVENOUS at 09:15

## 2021-04-24 RX ADMIN — TICAGRELOR 90 MG: 90 TABLET ORAL at 17:20

## 2021-04-24 RX ADMIN — ONDANSETRON 4 MG: 2 INJECTION INTRAMUSCULAR; INTRAVENOUS at 04:26

## 2021-04-24 RX ADMIN — HEPARIN SODIUM 4000 UNITS: 5000 INJECTION INTRAVENOUS; SUBCUTANEOUS at 04:27

## 2021-04-24 RX ADMIN — MORPHINE SULFATE 4 MG: 4 INJECTION INTRAVENOUS at 04:19

## 2021-04-24 RX ADMIN — SODIUM CHLORIDE 75 ML/HR: 900 INJECTION, SOLUTION INTRAVENOUS at 06:58

## 2021-04-24 RX ADMIN — VALSARTAN 320 MG: 320 TABLET, FILM COATED ORAL at 09:18

## 2021-04-24 RX ADMIN — EZETIMIBE 10 MG: 10 TABLET ORAL at 09:15

## 2021-04-24 RX ADMIN — Medication 10 ML: at 07:00

## 2021-04-24 RX ADMIN — LIDOCAINE HYDROCHLORIDE 3 ML: 10 INJECTION, SOLUTION EPIDURAL; INFILTRATION; INTRACAUDAL; PERINEURAL at 05:19

## 2021-04-24 RX ADMIN — Medication 10 ML: at 13:32

## 2021-04-24 RX ADMIN — PRAVASTATIN SODIUM 40 MG: 20 TABLET ORAL at 22:17

## 2021-04-24 RX ADMIN — HEPARIN SODIUM 2 ML: 10000 INJECTION, SOLUTION INTRAVENOUS; SUBCUTANEOUS at 05:19

## 2021-04-24 RX ADMIN — HEPARIN SODIUM 2 UNITS/HR: 200 INJECTION, SOLUTION INTRAVENOUS at 05:19

## 2021-04-24 RX ADMIN — HEPARIN SODIUM AND DEXTROSE 12 UNITS/KG/HR: 5000; 5 INJECTION INTRAVENOUS at 04:34

## 2021-04-24 RX ADMIN — SODIUM CHLORIDE 500 ML: 900 INJECTION, SOLUTION INTRAVENOUS at 04:36

## 2021-04-24 RX ADMIN — Medication 20 ML: at 22:29

## 2021-04-24 RX ADMIN — PANTOPRAZOLE SODIUM 40 MG: 40 TABLET, DELAYED RELEASE ORAL at 09:18

## 2021-04-24 NOTE — PROGRESS NOTES
TRANSFER - IN REPORT:    Verbal report received from [address]  being received from [id number] for routine progression of care      Report consisted of patients Situation, Background, Assessment and   Recommendations(SBAR). Information from the following report(s) SBAR, Kardex, ED Summary, Procedure Summary, Intake/Output, MAR, Recent Results and Cardiac Rhythm NSR was reviewed with the receiving nurse. Opportunity for questions and clarification was provided. Assessment completed upon patients arrival to unit and care assumed. No

## 2021-04-24 NOTE — PROCEDURES
[address] [facility]  CARDIAC CATH    Name:  Leanne Duarte  MR#:  [id number]  :  1950  ACCOUNT #:  [de-identified]  DATE OF SERVICE:  2021    PROCEDURES PERFORMED:  Left heart cath, selective coronary angiography, left ventriculogram with stenting x4 of an infarct-related right coronary artery. PREOPERATIVE DIAGNOSES:  cp    POSTOPERATIVE DIAGNOSES:  cad    SURGEON:  Argenis Cid MD    ASSISTANT:  None. ESTIMATED BLOOD LOSS:  5 mL    SPECIMENS REMOVED:  na    COMPLICATIONS:  None. IMPLANTS:  Four drug-eluting stents. ANESTHESIA:  None. Start time 5:10, completion time 6:00 a.m. Nurse, Herb Ballard. INDICATION:  Acute non-STEMI. ACCESS:  Right radial.    CATHETERS USED:  TIG-4 and pigtail were diagnostic catheters. Aubrey right 4, GuideLiner, Prowater were used for intervention. FINDINGS:  The patient presented with acute unstable coronary syndrome and was brought urgently to the lab for elevated troponins and continued chest pain. Right radial access was obtained. Images showed left ventriculogram with EF 55% to 60% with inferior wall hypokinesis. LVEDP of 26. No gradient on pullback. Left main is calcified but not severely diseased. It divides into LAD and circumflex. LAD courses to the apex and has diffuse moderate to severe calcification with 30% to 40% disease throughout and what appears to be a 70% to 90% mid-LAD lesion. Diagonals have diffuse moderate disease. Circumflex artery in the AV groove supplies two OMs and has diffuse moderate 40% plaquing throughout but no high-grade or culprit lesions. The left does provide collaterals to the distal right, the PDA and PL branches specifically. Right coronary artery arises off the right cusp. It is moderate to heavily calcified, moderately tortuous, and has an abrupt 100% acute occlusion in the mid RCA. The patient was given therapeutic dose of Angiomax.   Prowater wire was used to manipulate to the area of occlusion. Wire manipulation was difficult but we were able to place a wire into the distal PDA. Balloon angioplasty was performed again with moderate level of difficulty due to calcification and tortuosity. Eventually, the right coronary artery was stented with four [place] drug-eluting stents placed with the assistance of a GuideLiner. These were three 2.5 x 15-mm Fabrizio stents and a 2.5 x 8-mm [place] stent. These were all in contiguous fashion. They were post dilated with an NC balloon to high pressure with good results. There was resultant MIGUEL 3 flow where previously it had been MIGUEL 0 flow. CONCLUSION:  Successful stenting of a culprit infarct-related right coronary artery with four drug-eluting stents and restitution of MIGUEL 3 flow. The patient has what appears to be hemodynamically significant high-grade LAD disease that will be addressed at a later date.       Lanette Castro MD      [id number]  D:  04/24/2021 6:25  T:  04/24/2021 13:55  JOB #:  [id number]

## 2021-04-24 NOTE — ED PROVIDER NOTES
27-year-old male presents with chest pain left arm pain. Patient was out eating dinner last night around 7 PM wife states he got very ashen in color and did not feel well. He lost his appetite only ate a few bites. They went home he laid down around midnight he started complaining of a cramp-like feeling in his chest continued to bother him and became more intense and down his left arm. Brought to the ER. Patient has a history of CAD with a stent placed 7 years ago. His only anticoagulation therapy is baby aspirin. The history is provided by the patient. Chest Pain   This is a new problem. The current episode started 3 to 5 hours ago. The problem has been gradually worsening. The pain is associated with rest. The pain is present in the substernal region. The pain is at a severity of 7/10. The pain is moderate. Quality: \"Cramp\" The pain radiates to the left arm. Associated symptoms include malaise/fatigue and weakness. Pertinent negatives include no back pain, no cough, no diaphoresis, no irregular heartbeat and no shortness of breath. He has tried aspirin for the symptoms.         Past Medical History:   Diagnosis Date    Acute myocardial infarction, subendocardial infarction, initial episode of care ([facility] 05/29/2014    NSTEMI-- stent x 1---2014-- followed by dr Jae Bender Adverse effect of anesthesia     slow to wake up per pt    B[facility] carotid artery [facility] ([facility] 03/03/2017    followed by dr Willy Kelley CAD (coronary artery disease)     mi  2014--- stent x 1--- 2014--- followed by dr Rob Grider kidney disease, stage II (mild) 05/29/2014    pt denies--- followed by dr. Daya Lama    Diabetes [facility])     type 2-- sqbs am avg 100-115--- no hypo    History of CVA (cerebrovascular accident) 03/03/2017    \" light\"-- 2017-- no residual    HLD (hyperlipidemia) 8/7/2015    PVC (premature ventricular contraction) 3/3/2017    Right ankle pain     r/t fall from ladder    Tobacco use disorder 2014       Past Surgical History:   Procedure Laterality Date    HX COLONOSCOPY  10/16/15    Dr. Raymond Stahl; colon polyp; repeat 5 yrs    HX CORONARY STENT PLACEMENT      HX ENDOSCOPY  10/16/15    Dr. Raymond Stahl; Grade A esophagitis    HX ORTHOPAEDIC  20 yrs ago    bilateral shoulder scope    HX ORTHOPAEDIC Right 10/20/2020    Dr. Fawad Barkley; ankle ORIF    [place] LEFT HEART CATH,PERCUTANEOUS  2014    xience to mRCA         Family History:   Problem Relation Age of Onset    Cancer Mother 72        breast    Psychiatric Disorder Mother         Alzheimers    Heart Disease Father     Heart Disease Sister     Heart Disease Brother 64        5 bypass at one time    COPD Brother     Heart Disease Sister     No Known Problems Sister        Social History     Socioeconomic History    Marital status:      Spouse name: Not on file    Number of children: Not on file    Years of education: Not on file    Highest education level: Not on file   Occupational History    Not on file   Social Needs    Financial resource strain: Not on file    Food insecurity     Worry: Not on file     Inability: Not on file    Transportation needs     Medical: Not on file     Non-medical: Not on file   Tobacco Use    Smoking status: Former Smoker     Years: 40.00     Quit date: 10/20/2020     Years since quittin.5    Smokeless tobacco: Never Used    Tobacco comment: did smoke 1ppd x 40 yrs-- now only occ   Substance and Sexual Activity    Alcohol use:  Yes     Alcohol/week: 0.0 standard drinks     Comment: maybe once a month    Drug use: No    Sexual activity: Not on file   Lifestyle    Physical activity     Days per week: Not on file     Minutes per session: Not on file    Stress: Not on file   Relationships    Social connections     Talks on phone: Not on file     Gets together: Not on file     Attends Worship service: Not on file     Active member of club or organization: Not on file     Attends meetings of clubs or organizations: Not on file     Relationship status: Not on file    Intimate partner violence     Fear of current or ex partner: Not on file     Emotionally abused: Not on file     Physically abused: Not on file     Forced sexual activity: Not on file   Other Topics Concern    Not on file   Social History Narrative    Not on file         ALLERGIES: Crestor [rosuvastatin], Lipitor [atorvastatin], and Zocor [simvastatin]    Review of Systems   Constitutional: Positive for malaise/fatigue. Negative for activity change and diaphoresis. HENT: Negative. Eyes: Negative. Respiratory: Negative. Negative for cough and shortness of breath. Cardiovascular: Positive for chest pain. Gastrointestinal: Negative. Genitourinary: Negative. Musculoskeletal: Negative. Negative for back pain. Skin: Negative. Neurological: Positive for weakness. Psychiatric/Behavioral: Negative. All other systems reviewed and are negative. Vitals:    04/24/21 0412 04/24/21 0415 04/24/21 0426 04/24/21 0434   BP: (!) 107/59 (!) 102/59 (!) 87/50 (!) 106/57   Pulse: 80 68 67 82   Resp: 16 18 12    Temp:       SpO2: 95% 96% 93% 95%   Weight:       Height:                Physical Exam  Vitals signs and nursing note reviewed. Constitutional:       General: He is not in acute distress. Appearance: Normal appearance. He is well-developed. He is not ill-appearing, toxic-appearing or diaphoretic. HENT:      Head: Normocephalic and atraumatic. No right periorbital erythema or left periorbital erythema. Jaw: There is normal jaw occlusion. Salivary Glands: Right salivary gland is not diffusely enlarged. Left salivary gland is not diffusely enlarged. Right Ear: External ear normal.      Left Ear: External ear normal.      Nose: Nose normal. No congestion or rhinorrhea.       Mouth/Throat:      Mouth: Mucous membranes are moist.      Pharynx: No oropharyngeal exudate or posterior oropharyngeal erythema. Eyes:      General: Lids are normal. No scleral icterus. Right eye: No discharge. Left eye: No discharge. Extraocular Movements: Extraocular movements intact. Conjunctiva/sclera: Conjunctivae normal.      Right eye: Right conjunctiva is not injected. Left eye: Left conjunctiva is not injected. Pupils: Pupils are equal, round, and reactive to light. Neck:      Musculoskeletal: Full passive range of motion without pain, normal range of motion and neck supple. Normal range of motion. No erythema, neck rigidity, injury, pain with movement or muscular tenderness. Thyroid: No thyroid mass. Vascular: No JVD. Trachea: Trachea and phonation normal.   Cardiovascular:      Rate and Rhythm: Normal rate and regular rhythm. Pulses: Normal pulses. Heart sounds: Normal heart sounds. Heart sounds not distant. No murmur. No friction rub. No gallop. Pulmonary:      Effort: Pulmonary effort is normal. No tachypnea, accessory muscle usage, respiratory distress or retractions. Breath sounds: No stridor. No decreased breath sounds, wheezing, rhonchi or rales. Chest:      Chest wall: No tenderness. Abdominal:      General: Abdomen is flat. Bowel sounds are normal. There is no distension. There are no signs of injury. Palpations: Abdomen is soft. There is no fluid wave, mass or pulsatile mass. Tenderness: There is no abdominal tenderness. There is no guarding or rebound. Musculoskeletal: Normal range of motion. General: No swelling, tenderness, deformity or signs of injury. Right lower leg: No edema. Left lower leg: No edema. Lymphadenopathy:      Cervical: No cervical adenopathy. Skin:     General: Skin is warm and dry. Capillary Refill: Capillary refill takes less than 2 seconds. Coloration: Skin is not jaundiced or pale. Findings: No bruising, erythema or rash.    Neurological:      General: No focal deficit present. Mental Status: He is alert and oriented to person, place, and time. Mental status is at baseline. GCS: GCS eye subscore is 4. GCS verbal subscore is 5. GCS motor subscore is 6. Cranial Nerves: No dysarthria or facial asymmetry. Sensory: No sensory deficit. Motor: No weakness or tremor. Coordination: Coordination normal.   Psychiatric:         Mood and Affect: Mood normal.         Behavior: Behavior normal. Behavior is cooperative. Thought Content: Thought content normal.         Judgment: Judgment normal.          MDM  Number of Diagnoses or Management Options  Diagnosis management comments: EKG suspicious in the inferior leads with an inverted T wave in lead II. Second EKG possible 1 mm elevation in lead II and lead III. Elevated troponin. STEMI called.        Amount and/or Complexity of Data Reviewed  Clinical lab tests: ordered and reviewed  Tests in the radiology section of CPT®: reviewed and ordered  Tests in the medicine section of CPT®: ordered and reviewed  Decide to obtain previous medical records or to obtain history from someone other than the patient: yes  Review and summarize past medical records: yes  Independent visualization of images, tracings, or specimens: yes    Risk of Complications, Morbidity, and/or Mortality  Presenting problems: high  Diagnostic procedures: high  Management options: high    Patient Progress  Patient progress: stable         Procedures

## 2021-04-24 NOTE — ED NOTES
Pt medicated with morphine and zofran. Dr. Bipin Queen states to hold Nitro at this time due to pt's bp. Saline bolus running. Heparin drip running. Bilat groin and wrist clipped for cath lab.

## 2021-04-24 NOTE — PROCEDURES
Cardiac Catheterization Procedure Note    Patient ID:     Name: Kylee Powell   Medical Record Number: [id number]   YOB: 1950    Date of Procedure: 4/24/2021     Pre-procedure Diagnosis:  NSTEMI    Post-procedure Diagnosis: Coronary Artery Disease    Reason for Procedure: ACS < or = 24 Hours    Blood loss less than 5 ml    Sedation. Pt received 0 mg versed and 0 mcg fentanyl for monitored conscious sedation from 510to 600. independent trained observer to assist in the monitoring of the patient's level of consciousness and physiological status was present    Nurse cara    Specimen: None    No complications    No assistants    Time out, Mallampati, and ASA performed    Procedure:  After informed consent, patient was prepped and draped in the usual sterile fashion. Right radial approach was used. 170cc Visipaque contrast were utilized for the entire procedure. no closure device used        FINDINGS    Left Ventricle: 60  LVEDP: 26    Left Main:ok    Left Anterior descending coronary artery: diffuse disease mid 90%       Left Circumflex coronary artery: diffuse 30-40%        Right coronary artery: 100% mid rca            Graft anatomy: na    Intervention if done: stent x 4 to open the rca difficult due to tortuosity and calcification    Conclusions: one vessel pci for nstemi    Recommentations: dapt    No complications    Family and or significant other were sought out and discussion of the procedure and findings took place. Procedure and findings including pertinent sequele were discussed with the patient immediately post procedure. Opportunity to ask questions was offered. If no one was available in the post procedure waiting area, I can be reached thru paging system or at [phone].           Signed By: Nu Law MD

## 2021-04-24 NOTE — H&P
Subjective:     Patient is a 79 y.o.  male presents withchest pain, chest pressure/discomfort. Onset of symptoms was abrupt with gradually worsening course since that time. The chest pain, chest pressure/discomfort is constant and is rated as moderate. The chest pain, chest pressure/discomfort. is aggravated exertion with mild activity  . Symptoms improve with none.     Patient Active Problem List    Diagnosis Date Noted    ACS [facility]acute [facility] syndrome) ([facility] 04/24/2021    Erectile dysfunction 09/21/2020    Primary osteoarthritis of left knee 03/02/2020    Statin intolerance 03/02/2020    Gastroesophageal reflux disease without esophagitis 08/27/2019    Controlled type 2 diabetes mellitus with stage 2 chronic kidney disease, with long-term current use of insulin ([address] 01/11/2018    History of CVA (cerebrovascular accident) 03/03/2017    PVC (premature ventricular contraction) 03/03/2017    Bilateral carotid artery disease ([facility] 03/03/2017    Chronic diastolic congestive heart failure ([facility] 02/23/2017    Syncope 02/11/2017    Essential hypertension 08/07/2015    Dyslipidemia 08/07/2015    Chronic kidney disease, stage II (mild) 05/29/2014     Past Medical History:   Diagnosis Date    Acute myocardial infarction, subendocardial infarction, initial episode of care ([facility] 05/29/2014    NSTEMI-- stent x 1---2014-- followed by dr Anika Lang Adverse effect of anesthesia     slow to wake up per pt    Bilateral carotid artery disease ([facility] 03/03/2017    followed by dr Vik Allan CAD (coronary artery disease)     mi  2014--- stent x 1--- 2014--- followed by dr Gamaliel Mejia kidney disease, stage II (mild) 05/29/2014    pt denies--- followed by dr. Chantel Pemberton    Diabetes [facility])     type 2-- sqbs am avg 100-115--- no hypo    History of CVA (cerebrovascular accident) 03/03/2017    \" light\"-- 2017-- no residual    HLD (hyperlipidemia) 8/7/2015    PVC (premature ventricular contraction) 3/3/2017    Right ankle pain     r/t fall from ladder    Tobacco use disorder 2014      Past Surgical History:   Procedure Laterality Date    HX COLONOSCOPY  10/16/15    Dr. Tank Mahmood; colon polyp; repeat 5 yrs    HX CORONARY STENT PLACEMENT      HX ENDOSCOPY  10/16/15    Dr. Tank Mahmood; Grade A esophagitis    HX ORTHOPAEDIC  20 yrs ago    bilateral shoulder scope    HX ORTHOPAEDIC Right 10/20/2020    Dr. Lisa Sanchez; ankle ORIF    IA LEFT HEART CATH,PERCUTANEOUS  2014    xience to mRCA      [unfilled]  Allergies   Allergen Reactions    Crestor [Rosuvastatin] Myalgia    Lipitor [Atorvastatin] Myalgia    Zocor [Simvastatin] Myalgia      Social History     Tobacco Use    Smoking status: Former Smoker     Years: 40.00     Quit date: 10/20/2020     Years since quittin.5    Smokeless tobacco: Never Used    Tobacco comment: did smoke 1ppd x 40 yrs-- now only occ   Substance Use Topics    Alcohol use:  Yes     Alcohol/week: 0.0 standard drinks     Comment: maybe once a month      Family History   Problem Relation Age of Onset    Cancer Mother 72        breast    Psychiatric Disorder Mother         Alzheimers    Heart Disease Father     Heart Disease Sister     Heart Disease Brother 64        5 bypass at one time    COPD Brother     Heart Disease Sister     No Known Problems Sister       Review of Systems  Constitutional: negative for fevers and chills  Eyes: negative for visual disturbance  Ears, nose, mouth, throat, and face: negative for hearing loss and tinnitus  Respiratory: negative for cough or sputum  Cardiovascular: positive for chest pain, chest pressure/discomfort  Gastrointestinal: negative for dysphagia, nausea and vomiting    Objective:     Patient Vitals for the past 8 hrs:   BP Temp Pulse Resp SpO2 Height Weight   21 0434 (!) 106/57  82  95 %     21 0426 (!) 87/50  67 12 93 %     21 0415 (!) 102/59  68 18 96 %     21 0412 (!) 107/59  80 16 95 %   04/24/21 0330 (!) 146/73  85 16 94 %     04/24/21 0304 (!) 144/88 97.9 °F (36.6 °C) 94 16 96 % 6' 1\" (1.854 m) 205 lb (93 kg)     No intake/output data recorded. No intake/output data recorded. [unfilled]  [unfilled]    Physical Exam:  GENERAL: alert, cooperative, mild distress, appears stated age  THROAT & NECK: normal and no erythema or exudates noted. LUNG: clear to auscultation bilaterally  HEART: regular rate and rhythm, S1, S2 normal, no murmur, click, rub or gallop  ABDOMEN: soft, non-tender. Bowel sounds normal. No masses,  no organomegaly  EXTREMITIES:  extremities normal, atraumatic, no cyanosis or edema  SKIN: no rash or abnormalities        Data included below includes an independent review by me    ECG: normal sinus rhythm, nonspecific ST and T waves changes     Data Review:     Recent Results (from the past 24 hour(s))   TROPONIN-HIGH SENSITIVITY    Collection Time: 04/24/21  3:11 AM   Result Value Ref Range    Troponin-High Sensitivity 2,816.6 (HH) 0 - 14 pg/mL   CBC WITH AUTOMATED DIFF    Collection Time: 04/24/21  3:11 AM   Result Value Ref Range    WBC 12.1 (H) 4.3 - 11.1 K/uL    RBC 4.69 4.23 - 5.6 M/uL    HGB 15.8 13.6 - 17.2 g/dL    HCT 44.8 41.1 - 50.3 %    MCV 95.5 79.6 - 97.8 FL    MCH 33.7 (H) 26.1 - 32.9 PG    MCHC 35.3 (H) 31.4 - 35.0 g/dL    RDW 11.7 (L) 11.9 - 14.6 %    PLATELET 478 192 - 127 K/uL    MPV 8.7 (L) 9.4 - 12.3 FL    ABSOLUTE NRBC 0.00 0.0 - 0.2 K/uL    DF AUTOMATED      NEUTROPHILS 66 43 - 78 %    LYMPHOCYTES 25 13 - 44 %    MONOCYTES 7 4.0 - 12.0 %    EOSINOPHILS 1 0.5 - 7.8 %    BASOPHILS 1 0.0 - 2.0 %    IMMATURE GRANULOCYTES 0 0.0 - 5.0 %    ABS. NEUTROPHILS 8.1 1.7 - 8.2 K/UL    ABS. LYMPHOCYTES 3.1 0.5 - 4.6 K/UL    ABS. MONOCYTES 0.8 0.1 - 1.3 K/UL    ABS. EOSINOPHILS 0.1 0.0 - 0.8 K/UL    ABS. BASOPHILS 0.1 0.0 - 0.2 K/UL    ABS. IMM.  GRANS. 0.0 0.0 - 0.5 K/UL   METABOLIC PANEL, COMPREHENSIVE    Collection Time: 04/24/21  3:11 AM   Result Value Ref Range Sodium 133 (L) 136 - 145 mmol/L    Potassium 3.6 3.5 - 5.1 mmol/L    Chloride 98 98 - 107 mmol/L    CO2 24 21 - 32 mmol/L    Anion gap 11 7 - 16 mmol/L    Glucose 133 (H) 65 - 100 mg/dL    BUN 20 8 - 23 MG/DL    Creatinine 1.10 0.8 - 1.5 MG/DL    GFR est AA >60 >60 ml/min/1.73m2    GFR est non-AA >60 >60 ml/min/1.73m2    Calcium 9.3 8.3 - 10.4 MG/DL    Bilirubin, total 0.7 0.2 - 1.1 MG/DL    ALT (SGPT) 32 12 - 65 U/L    AST (SGOT) 60 (H) 15 - 37 U/L    Alk. phosphatase 88 50 - 136 U/L    Protein, total 7.3 6.3 - 8.2 g/dL    Albumin 3.8 3.2 - 4.6 g/dL    Globulin 3.5 2.3 - 3.5 g/dL    A-G Ratio 1.1 (L) 1.2 - 3.5     LIPASE    Collection Time: 04/24/21  3:11 AM   Result Value Ref Range    Lipase 72 (L) 73 - 393 U/L   MAGNESIUM    Collection Time: 04/24/21  3:11 AM   Result Value Ref Range    Magnesium 2.1 1.8 - 2.4 mg/dL   PTT    Collection Time: 04/24/21  3:11 AM   Result Value Ref Range    aPTT 30.1 24.1 - 35.1 SEC       was negative for infiltrate, effusion, pneumothorax, or wide mediastinum    Assessment:     Active Problems:    Chronic kidney disease, stage II (mild) (5/29/2014)    Estimated Creatinine Clearance: 70.6 mL/min (based on SCr of 1.1 mg/dL). Essential hypertension (8/7/2015)    The current medical regimen is effective;  continue present plan and medications. Dyslipidemia (8/7/2015)    The current medical regimen is effective;  continue present plan and medications. Bilateral carotid artery disease ([facility] (3/3/2017)    monitor      Controlled type 2 diabetes mellitus with stage 2 chronic kidney disease, with long-term current use of insulin ([facility] (1/11/2018)    Monitor blood sugar      ACS (acute coronary syndrome) ([facility] (4/24/2021)    Plan for primary cath and angioplasty. Pt set up for procedure. Risks benefits and alternatives discussed. Pt agrees to proceed.  Risks of bleeding infection emergent surgical procedure loss of life or limb renal failure and other known risks discussed. Pt agrees to proceed and agrees to sign consent form.            Maura Carlton MD

## 2021-04-24 NOTE — PROGRESS NOTES
TRANSFER - OUT REPORT:    STEMI   R radial   Angiomax  Brilinta 180 mg  4 Stents to the RCA  TR band 12 ml  No bleeding or hematoma noted at site. SIte soft    Verbal report given to CVICU RN(name) on Braydon Zendejas  being transferred to [facility] for routine progression of care       Report consisted of patients Situation, Background, Assessment and   Recommendations(SBAR). Information from the following report(s) Procedure Summary and MAR was reviewed with the receiving nurse. Lines:   Peripheral IV 04/24/21 Left Antecubital (Active)   Site Assessment Clean, dry, & intact 04/24/21 0423   Phlebitis Assessment 0 04/24/21 0423   Infiltration Assessment 0 04/24/21 0423   Dressing Status Clean, dry, & intact 04/24/21 0423   Dressing Type 4 X 4 04/24/21 0423   Hub Color/Line Status Pink 04/24/21 0423   Alcohol Cap Used Yes 04/24/21 0423       Peripheral IV 04/24/21 Right Antecubital (Active)        Opportunity for questions and clarification was provided.       Patient transported with:   Registered Nurse

## 2021-04-24 NOTE — ED TRIAGE NOTES
Patient arrives to ED pov from home. Patient complaining of chest pain that started x 3 hours ago. Patient describes the chest pain as \"heavy and constant\". Denies SOB. Patient states he has nausea. Denies vomiting. Patient took 2 baby Aspirin prior to his arrival to ED. Patient has cardiac history.

## 2021-04-24 NOTE — PROGRESS NOTES
Bedside and Verbal shift change report received from Fabian Zuniga, [facility].

## 2021-04-24 NOTE — PROGRESS NOTES
Dual skin assessment completed with Estelita Mejia RN. No redness or breakdown noted. Right radial TR band in place.

## 2021-04-25 LAB
ANION GAP SERPL CALC-SCNC: 8 MMOL/L (ref 7–16)
BUN SERPL-MCNC: 16 MG/DL (ref 8–23)
CALCIUM SERPL-MCNC: 8.7 MG/DL (ref 8.3–10.4)
CHLORIDE SERPL-SCNC: 105 MMOL/L (ref 98–107)
CO2 SERPL-SCNC: 25 MMOL/L (ref 21–32)
CREAT SERPL-MCNC: 1.16 MG/DL (ref 0.8–1.5)
ERYTHROCYTE [DISTWIDTH] IN BLOOD BY AUTOMATED COUNT: 11.9 % (ref 11.9–14.6)
GLUCOSE BLD STRIP.AUTO-MCNC: 112 MG/DL (ref 65–100)
GLUCOSE BLD STRIP.AUTO-MCNC: 112 MG/DL (ref 65–100)
GLUCOSE BLD STRIP.AUTO-MCNC: 120 MG/DL (ref 65–100)
GLUCOSE BLD STRIP.AUTO-MCNC: 138 MG/DL (ref 65–100)
GLUCOSE SERPL-MCNC: 152 MG/DL (ref 65–100)
HCT VFR BLD AUTO: 41 % (ref 41.1–50.3)
HGB BLD-MCNC: 14.3 G/DL (ref 13.6–17.2)
MAGNESIUM SERPL-MCNC: 2 MG/DL (ref 1.8–2.4)
MCH RBC QN AUTO: 33.6 PG (ref 26.1–32.9)
MCHC RBC AUTO-ENTMCNC: 34.9 G/DL (ref 31.4–35)
MCV RBC AUTO: 96.2 FL (ref 79.6–97.8)
NRBC # BLD: 0 K/UL (ref 0–0.2)
PLATELET # BLD AUTO: 269 K/UL (ref 150–450)
PMV BLD AUTO: 8.9 FL (ref 9.4–12.3)
POTASSIUM SERPL-SCNC: 3.9 MMOL/L (ref 3.5–5.1)
RBC # BLD AUTO: 4.26 M/UL (ref 4.23–5.6)
SERVICE CMNT-IMP: ABNORMAL
SODIUM SERPL-SCNC: 138 MMOL/L (ref 138–145)
WBC # BLD AUTO: 11 K/UL (ref 4.3–11.1)

## 2021-04-25 PROCEDURE — 99232 SBSQ HOSP IP/OBS MODERATE 35: CPT | Performed by: INTERNAL MEDICINE

## 2021-04-25 PROCEDURE — 82962 GLUCOSE BLOOD TEST: CPT

## 2021-04-25 PROCEDURE — 74011636637 HC RX REV CODE- 636/637: Performed by: NURSE PRACTITIONER

## 2021-04-25 PROCEDURE — 36415 COLL VENOUS BLD VENIPUNCTURE: CPT

## 2021-04-25 PROCEDURE — 80048 BASIC METABOLIC PNL TOTAL CA: CPT

## 2021-04-25 PROCEDURE — 65660000000 HC RM CCU STEPDOWN

## 2021-04-25 PROCEDURE — 85027 COMPLETE CBC AUTOMATED: CPT

## 2021-04-25 PROCEDURE — 74011250637 HC RX REV CODE- 250/637: Performed by: NURSE PRACTITIONER

## 2021-04-25 PROCEDURE — 83735 ASSAY OF MAGNESIUM: CPT

## 2021-04-25 RX ADMIN — Medication 5 ML: at 20:29

## 2021-04-25 RX ADMIN — Medication 10 ML: at 06:36

## 2021-04-25 RX ADMIN — METOPROLOL TARTRATE 25 MG: 25 TABLET, FILM COATED ORAL at 20:26

## 2021-04-25 RX ADMIN — ASPIRIN 81 MG: 81 TABLET, CHEWABLE ORAL at 08:56

## 2021-04-25 RX ADMIN — INSULIN GLARGINE 17 UNITS: 100 INJECTION, SOLUTION SUBCUTANEOUS at 20:36

## 2021-04-25 RX ADMIN — PRAVASTATIN SODIUM 40 MG: 20 TABLET ORAL at 20:28

## 2021-04-25 RX ADMIN — TICAGRELOR 90 MG: 90 TABLET ORAL at 18:07

## 2021-04-25 RX ADMIN — VALSARTAN 320 MG: 320 TABLET, FILM COATED ORAL at 08:56

## 2021-04-25 RX ADMIN — TICAGRELOR 90 MG: 90 TABLET ORAL at 06:31

## 2021-04-25 RX ADMIN — EZETIMIBE 10 MG: 10 TABLET ORAL at 08:56

## 2021-04-25 RX ADMIN — METOPROLOL TARTRATE 25 MG: 25 TABLET, FILM COATED ORAL at 08:56

## 2021-04-25 RX ADMIN — PANTOPRAZOLE SODIUM 40 MG: 40 TABLET, DELAYED RELEASE ORAL at 08:56

## 2021-04-25 NOTE — ROUTINE PROCESS
Admission skin assessment completed with second RN and reveals the following: all skin intact with exception of right radial puncture status post heart cath. Sacrum and heels noted without breakdown.

## 2021-04-25 NOTE — PROGRESS NOTES
TRANSFER - OUT REPORT:    Verbal report given to Jessy Schlatter, RN(name) on [employer]  being transferred to 3rd floor, telemetry(unit) for routine progression of care       Report consisted of patients Situation, Background, Assessment and   Recommendations(SBAR). Information from the following report(s) SBAR, Kardex, Intake/Output, MAR, Recent Results and Cardiac Rhythm NSR was reviewed with the receiving nurse. Lines:   Peripheral IV 04/24/21 Left Antecubital (Active)   Site Assessment Clean, dry, & intact 04/25/21 0700   Phlebitis Assessment 0 04/25/21 0700   Infiltration Assessment 0 04/25/21 0700   Dressing Status Clean, dry, & intact 04/25/21 0700   Dressing Type Tape;Transparent 04/25/21 0700   Hub Color/Line Status Pink;Flushed;Capped; Patent 04/25/21 0700   Action Taken Open ports on tubing capped 04/24/21 1900   Alcohol Cap Used Yes 04/24/21 0423       Peripheral IV 04/24/21 Right Antecubital (Active)   Site Assessment Clean, dry, & intact 04/25/21 0700   Phlebitis Assessment 0 04/25/21 0700   Infiltration Assessment 0 04/25/21 0700   Dressing Status Clean, dry, & intact 04/25/21 0700   Dressing Type Tape;Transparent 04/25/21 0700   Hub Color/Line Status Green;Flushed;Capped; Patent 04/25/21 0700        Opportunity for questions and clarification was provided.       Patient transported with:   Monitor  Registered Nurse

## 2021-04-25 NOTE — ROUTINE PROCESS
TRANSFER - IN REPORT: 
 
Verbal report received from [address] on [address] being received from CVICU for routine progression of care Report consisted of patients Situation, Background, Assessment and Recommendations(SBAR). Information from the following report(s) SBAR, Kardex, Procedure Summary, Intake/Output, Med Rec Status and Cardiac Rhythm SR was reviewed with the receiving nurse. Opportunity for questions and clarification was provided. Assessment completed upon patients arrival to unit and care assumed.

## 2021-04-25 NOTE — PROGRESS NOTES
Bedside shift change report received from [place], [facility].

## 2021-04-25 NOTE — PROGRESS NOTES
am  4/25/2021 7:55 AM    Admit Date: 4/24/2021    Admit Diagnosis: NSTEMI (non-ST elevated myocardial infarction) ([facility] [I21.4]      Subjective:    Patient : alert, well appearing, and in no distress and oriented to person, place, and time. Presenting cardiac complaints are rapidly improving presented with stemi. Feels better has LAD lesion that we may address tomorrow    Objective:      Visit Vitals  /60 (BP 1 Location: Left upper arm, BP Patient Position: Sitting; At rest)   Pulse 66   Temp 97.6 °F (36.4 °C)   Resp 25   Ht 6' 1\" (1.854 m)   Wt 205 lb 0.4 oz (93 kg)   SpO2 94%   BMI 27.05 kg/m²       ROS:  General ROS: negative for - chills  Hematological and Lymphatic ROS: negative for - blood clots or jaundice  Respiratory ROS: no cough, shortness of breath, or wheezing  Cardiovascular ROS: no chest pain or dyspnea on exertion  Gastrointestinal ROS: no abdominal pain, change in bowel habits, or black or bloody stools  Neurological ROS: no TIA or stroke symptoms    Physical Exam:      Physical Examination: General appearance - Appearance: alert, well appearing, and in no distress.    Neck/lymph - supple, no significant adenopathy  Chest/CV - clear to auscultation, no wheezes, rales or rhonchi, symmetric air entry  Heart - normal rate, regular rhythm, normal S1, S2, no murmurs, rubs, clicks or gallops  Abdomen/GI - soft, nontender, nondistended, no masses or organomegaly   Musculoskeletal - no joint tenderness, deformity or swelling  Extremities - peripheral pulses normal, no pedal edema, no clubbing or cyanosis  Skin - normal coloration and turgor, no rashes, no suspicious skin lesions noted    Current Facility-Administered Medications   Medication Dose Route Frequency    ezetimibe (ZETIA) tablet 10 mg  10 mg Oral DAILY    insulin glargine (LANTUS) injection 34 Units  34 Units SubCUTAneous QHS    insulin lispro (HUMALOG) injection   SubCUTAneous ACB&D    pantoprazole (PROTONIX) tablet 40 mg  40 mg Oral ACB  pravastatin (PRAVACHOL) tablet 40 mg  40 mg Oral QHS    valsartan (DIOVAN) tablet 320 mg  320 mg Oral DAILY    alum-mag hydroxide-simeth (MYLANTA) oral suspension 30 mL  30 mL Oral Q4H PRN    diphenhydrAMINE (BENADRYL) capsule 25 mg  25 mg Oral Q6H PRN    sodium chloride (NS) flush 5-40 mL  5-40 mL IntraVENous Q8H    sodium chloride (NS) flush 5-40 mL  5-40 mL IntraVENous PRN    acetaminophen (TYLENOL) tablet 650 mg  650 mg Oral Q6H PRN    HYDROcodone-acetaminophen (NORCO) 5-325 mg per tablet 1 Tab  1 Tab Oral Q6H PRN    morphine injection 2 mg  2 mg IntraVENous Q4H PRN    nitroglycerin (NITROSTAT) tablet 0.4 mg  0.4 mg SubLINGual Q5MIN PRN    metoprolol tartrate (LOPRESSOR) tablet 25 mg  25 mg Oral Q12H    aspirin chewable tablet 81 mg  81 mg Oral DAILY    ticagrelor (BRILINTA) tablet 90 mg  90 mg Oral Q12H    ondansetron (ZOFRAN) injection 4 mg  4 mg IntraVENous Q4H PRN    docusate sodium (COLACE) capsule 100 mg  100 mg Oral BID PRN    0.9% sodium chloride infusion  75 mL/hr IntraVENous CONTINUOUS       Data Review: data included in this note has been independently reviewed by the author   CMP:   Lab Results   Component Value Date/Time     04/25/2021 03:10 AM    K 3.9 04/25/2021 03:10 AM     04/25/2021 03:10 AM    CO2 25 04/25/2021 03:10 AM    AGAP 8 04/25/2021 03:10 AM     (H) 04/25/2021 03:10 AM    BUN 16 04/25/2021 03:10 AM    CREA 1.16 04/25/2021 03:10 AM    GFRAA >60 04/25/2021 03:10 AM    GFRNA >60 04/25/2021 03:10 AM    CA 8.7 04/25/2021 03:10 AM    MG 2.0 04/25/2021 03:10 AM     CBC:   Lab Results   Component Value Date/Time    WBC 11.0 04/25/2021 03:10 AM    HGB 14.3 04/25/2021 03:10 AM    HCT 41.0 (L) 04/25/2021 03:10 AM     04/25/2021 03:10 AM        TELEMETRY: nsr    Assessment/Plan:     Active Problems:    Chronic kidney disease, stage II (mild) (5/29/2014)    Estimated Creatinine Clearance: 67 mL/min (based on SCr of 1.16 mg/dL).   monitor      Essential hypertension (8/7/2015)    The current medical regimen is effective;  continue present plan and medications. Dyslipidemia (8/7/2015)    The current medical regimen is effective;  continue present plan and medications. [facility] (3/3/2017)           Controlled type 2 diabetes mellitus with stage 2 chronic kidney disease, with long-term current use of insulin ([facility] (1/11/2018)    The current medical regimen is effective;  continue present plan and medications.        ACS (acute coronary syndrome) ([facility] (4/24/2021)    Better after primary PCI      NSTEMI (non-ST elevated myocardial infarction) ([facility] (4/24/2021)     On KEVINT             Eduardo Sainz MD

## 2021-04-25 NOTE — ROUTINE PROCESS
Verbal bedside report received from [place], [facility]. Assumed care of patient. R radial cath site visualized

## 2021-04-26 VITALS
HEART RATE: 61 BPM | SYSTOLIC BLOOD PRESSURE: 120 MMHG | DIASTOLIC BLOOD PRESSURE: 65 MMHG | BODY MASS INDEX: 26.66 KG/M2 | WEIGHT: 201.2 LBS | TEMPERATURE: 97.6 F | RESPIRATION RATE: 18 BRPM | OXYGEN SATURATION: 96 % | HEIGHT: 73 IN

## 2021-04-26 LAB
ANION GAP SERPL CALC-SCNC: 10 MMOL/L (ref 7–16)
BUN SERPL-MCNC: 16 MG/DL (ref 8–23)
CALCIUM SERPL-MCNC: 8.8 MG/DL (ref 8.3–10.4)
CHLORIDE SERPL-SCNC: 105 MMOL/L (ref 98–107)
CO2 SERPL-SCNC: 23 MMOL/L (ref 21–32)
CREAT SERPL-MCNC: 1.01 MG/DL (ref 0.8–1.5)
ERYTHROCYTE [DISTWIDTH] IN BLOOD BY AUTOMATED COUNT: 11.7 % (ref 11.9–14.6)
GLUCOSE BLD STRIP.AUTO-MCNC: 104 MG/DL (ref 65–100)
GLUCOSE SERPL-MCNC: 112 MG/DL (ref 65–100)
HCT VFR BLD AUTO: 41.9 % (ref 41.1–50.3)
HGB BLD-MCNC: 14.6 G/DL (ref 13.6–17.2)
MAGNESIUM SERPL-MCNC: 2.2 MG/DL (ref 1.8–2.4)
MCH RBC QN AUTO: 33.6 PG (ref 26.1–32.9)
MCHC RBC AUTO-ENTMCNC: 34.8 G/DL (ref 31.4–35)
MCV RBC AUTO: 96.5 FL (ref 79.6–97.8)
NRBC # BLD: 0 K/UL (ref 0–0.2)
PLATELET # BLD AUTO: 254 K/UL (ref 150–450)
PMV BLD AUTO: 8.8 FL (ref 9.4–12.3)
POTASSIUM SERPL-SCNC: 3.7 MMOL/L (ref 3.5–5.1)
RBC # BLD AUTO: 4.34 M/UL (ref 4.23–5.6)
SERVICE CMNT-IMP: ABNORMAL
SODIUM SERPL-SCNC: 138 MMOL/L (ref 136–145)
WBC # BLD AUTO: 9.5 K/UL (ref 4.3–11.1)

## 2021-04-26 PROCEDURE — C1769 GUIDE WIRE: HCPCS

## 2021-04-26 PROCEDURE — 4A023N7 MEASUREMENT OF CARDIAC SAMPLING AND PRESSURE, LEFT HEART, PERCUTANEOUS APPROACH: ICD-10-PCS | Performed by: INTERNAL MEDICINE

## 2021-04-26 PROCEDURE — 77030016699 HC CATH ANGI DX INFN1 CARD -A

## 2021-04-26 PROCEDURE — 74011000258 HC RX REV CODE- 258: Performed by: INTERNAL MEDICINE

## 2021-04-26 PROCEDURE — 77030012468 HC VLV BLEEDBK CNTRL ABBT -B

## 2021-04-26 PROCEDURE — 027034Z DILATION OF CORONARY ARTERY, ONE ARTERY WITH DRUG-ELUTING INTRALUMINAL DEVICE, PERCUTANEOUS APPROACH: ICD-10-PCS | Performed by: INTERNAL MEDICINE

## 2021-04-26 PROCEDURE — 99152 MOD SED SAME PHYS/QHP 5/>YRS: CPT

## 2021-04-26 PROCEDURE — 80048 BASIC METABOLIC PNL TOTAL CA: CPT

## 2021-04-26 PROCEDURE — 92928 PRQ TCAT PLMT NTRAC ST 1 LES: CPT

## 2021-04-26 PROCEDURE — 2709999900 HC NON-CHARGEABLE SUPPLY

## 2021-04-26 PROCEDURE — 99152 MOD SED SAME PHYS/QHP 5/>YRS: CPT | Performed by: INTERNAL MEDICINE

## 2021-04-26 PROCEDURE — 74011250636 HC RX REV CODE- 250/636: Performed by: INTERNAL MEDICINE

## 2021-04-26 PROCEDURE — C1894 INTRO/SHEATH, NON-LASER: HCPCS

## 2021-04-26 PROCEDURE — C1887 CATHETER, GUIDING: HCPCS

## 2021-04-26 PROCEDURE — 92928 PRQ TCAT PLMT NTRAC ST 1 LES: CPT | Performed by: INTERNAL MEDICINE

## 2021-04-26 PROCEDURE — C1874 STENT, COATED/COV W/DEL SYS: HCPCS

## 2021-04-26 PROCEDURE — 74011000636 HC RX REV CODE- 636: Performed by: INTERNAL MEDICINE

## 2021-04-26 PROCEDURE — 36415 COLL VENOUS BLD VENIPUNCTURE: CPT

## 2021-04-26 PROCEDURE — 85027 COMPLETE CBC AUTOMATED: CPT

## 2021-04-26 PROCEDURE — 74011250637 HC RX REV CODE- 250/637: Performed by: NURSE PRACTITIONER

## 2021-04-26 PROCEDURE — 74011000250 HC RX REV CODE- 250: Performed by: INTERNAL MEDICINE

## 2021-04-26 PROCEDURE — 77030019569 HC BND COMPR RAD TERU -B

## 2021-04-26 PROCEDURE — 82962 GLUCOSE BLOOD TEST: CPT

## 2021-04-26 PROCEDURE — B2111ZZ FLUOROSCOPY OF MULTIPLE CORONARY ARTERIES USING LOW OSMOLAR CONTRAST: ICD-10-PCS | Performed by: INTERNAL MEDICINE

## 2021-04-26 PROCEDURE — 83735 ASSAY OF MAGNESIUM: CPT

## 2021-04-26 PROCEDURE — 99238 HOSP IP/OBS DSCHRG MGMT 30/<: CPT | Performed by: INTERNAL MEDICINE

## 2021-04-26 RX ORDER — HEPARIN SODIUM 200 [USP'U]/100ML
2 INJECTION, SOLUTION INTRAVENOUS CONTINUOUS
Status: DISCONTINUED | OUTPATIENT
Start: 2021-04-26 | End: 2021-04-26 | Stop reason: HOSPADM

## 2021-04-26 RX ORDER — MIDAZOLAM HYDROCHLORIDE 1 MG/ML
.5-2 INJECTION, SOLUTION INTRAMUSCULAR; INTRAVENOUS
Status: DISCONTINUED | OUTPATIENT
Start: 2021-04-26 | End: 2021-04-26 | Stop reason: HOSPADM

## 2021-04-26 RX ORDER — NITROGLYCERIN 0.4 MG/1
0.4 TABLET SUBLINGUAL
Qty: 25 TAB | Refills: 11 | Status: SHIPPED | OUTPATIENT
Start: 2021-04-26

## 2021-04-26 RX ORDER — METOPROLOL SUCCINATE 25 MG/1
25 TABLET, EXTENDED RELEASE ORAL DAILY
Qty: 30 TAB | Refills: 3 | Status: SHIPPED | OUTPATIENT
Start: 2021-04-26 | End: 2022-01-10

## 2021-04-26 RX ORDER — LIDOCAINE HYDROCHLORIDE 10 MG/ML
1-20 INJECTION, SOLUTION EPIDURAL; INFILTRATION; INTRACAUDAL; PERINEURAL ONCE
Status: COMPLETED | OUTPATIENT
Start: 2021-04-26 | End: 2021-04-26

## 2021-04-26 RX ADMIN — METOPROLOL TARTRATE 25 MG: 25 TABLET, FILM COATED ORAL at 08:23

## 2021-04-26 RX ADMIN — IOPAMIDOL 80 ML: 755 INJECTION, SOLUTION INTRAVENOUS at 10:51

## 2021-04-26 RX ADMIN — EZETIMIBE 10 MG: 10 TABLET ORAL at 08:23

## 2021-04-26 RX ADMIN — VERAPAMIL HYDROCHLORIDE 2 ML: 2.5 INJECTION, SOLUTION INTRAVENOUS at 10:36

## 2021-04-26 RX ADMIN — ASPIRIN 81 MG: 81 TABLET, CHEWABLE ORAL at 08:23

## 2021-04-26 RX ADMIN — Medication 5 ML: at 05:47

## 2021-04-26 RX ADMIN — PANTOPRAZOLE SODIUM 40 MG: 40 TABLET, DELAYED RELEASE ORAL at 05:43

## 2021-04-26 RX ADMIN — HEPARIN SODIUM 2 UNITS/HR: 200 INJECTION, SOLUTION INTRAVENOUS at 10:35

## 2021-04-26 RX ADMIN — TICAGRELOR 90 MG: 90 TABLET ORAL at 05:43

## 2021-04-26 RX ADMIN — MIDAZOLAM 2 MG: 1 INJECTION INTRAMUSCULAR; INTRAVENOUS at 10:46

## 2021-04-26 RX ADMIN — BIVALIRUDIN 1.75 MG/KG/HR: 250 INJECTION, POWDER, LYOPHILIZED, FOR SOLUTION INTRAVENOUS at 10:37

## 2021-04-26 RX ADMIN — VALSARTAN 320 MG: 320 TABLET, FILM COATED ORAL at 08:23

## 2021-04-26 RX ADMIN — LIDOCAINE HYDROCHLORIDE 3 ML: 10 INJECTION, SOLUTION EPIDURAL; INFILTRATION; INTRACAUDAL; PERINEURAL at 10:36

## 2021-04-26 NOTE — ROUTINE PROCESS
Verbal bedside report given to [facility], oncoming RN. Patient's situation, background, assessment and recommendations provided. Opportunity for questions provided. Oncoming RN assumed care of patient

## 2021-04-26 NOTE — PROGRESS NOTES
TRANSFER - OUT REPORT:    Verbal report given to Kendal Burgess on Tj Louis  being transferred to 3rd floor for routine progression of care       Report consisted of patients Situation, Background, Assessment and Recommendations(SBAR). Information from the following report(s) SBAR, Kardex, Procedure Summary, MAR and Recent Results was reviewed with the receiving nurse. Opportunity for questions and clarification was provided. Right radial TR band C/D/I without bleeding or hematoma.

## 2021-04-26 NOTE — PROGRESS NOTES
am  4/26/2021 7:55 AM    Admit Date: 4/24/2021    Admit Diagnosis: NSTEMI (non-ST elevated myocardial infarction) ([facility] [I21.4]      Subjective:    Patient : alert, well appearing, and in no distress and oriented to person, place, and time. Presenting cardiac complaints are rapidly improving presented with stemi. Feels better has LAD lesion that we may address monday    Objective:      Visit Vitals  BP (!) 115/56 (BP 1 Location: Right arm, BP Patient Position: At rest)   Pulse 76   Temp 98 °F (36.7 °C)   Resp 20   Ht 6' 1\" (1.854 m)   Wt 201 lb 3.2 oz (91.3 kg)   SpO2 93%   BMI 26.55 kg/m²       ROS:  General ROS: negative for - chills  Hematological and Lymphatic ROS: negative for - blood clots or jaundice  Respiratory ROS: no cough, shortness of breath, or wheezing  Cardiovascular ROS: no chest pain or dyspnea on exertion  Gastrointestinal ROS: no abdominal pain, change in bowel habits, or black or bloody stools  Neurological ROS: no TIA or stroke symptoms    Physical Exam:      Physical Examination: General appearance - Appearance: alert, well appearing, and in no distress.    Neck/lymph - supple, no significant adenopathy  Chest/CV - clear to auscultation, no wheezes, rales or rhonchi, symmetric air entry  Heart - normal rate, regular rhythm, normal S1, S2, no murmurs, rubs, clicks or gallops  Abdomen/GI - soft, nontender, nondistended, no masses or organomegaly   Musculoskeletal - no joint tenderness, deformity or swelling  Extremities - peripheral pulses normal, no pedal edema, no clubbing or cyanosis  Skin - normal coloration and turgor, no rashes, no suspicious skin lesions noted    Current Facility-Administered Medications   Medication Dose Route Frequency    ezetimibe (ZETIA) tablet 10 mg  10 mg Oral DAILY    insulin glargine (LANTUS) injection 34 Units  34 Units SubCUTAneous QHS    insulin lispro (HUMALOG) injection   SubCUTAneous ACB&D    pantoprazole (PROTONIX) tablet 40 mg  40 mg Oral ACB    pravastatin (PRAVACHOL) tablet 40 mg  40 mg Oral QHS    valsartan (DIOVAN) tablet 320 mg  320 mg Oral DAILY    alum-mag hydroxide-simeth (MYLANTA) oral suspension 30 mL  30 mL Oral Q4H PRN    diphenhydrAMINE (BENADRYL) capsule 25 mg  25 mg Oral Q6H PRN    sodium chloride (NS) flush 5-40 mL  5-40 mL IntraVENous Q8H    sodium chloride (NS) flush 5-40 mL  5-40 mL IntraVENous PRN    acetaminophen (TYLENOL) tablet 650 mg  650 mg Oral Q6H PRN    HYDROcodone-acetaminophen (NORCO) 5-325 mg per tablet 1 Tab  1 Tab Oral Q6H PRN    morphine injection 2 mg  2 mg IntraVENous Q4H PRN    nitroglycerin (NITROSTAT) tablet 0.4 mg  0.4 mg SubLINGual Q5MIN PRN    metoprolol tartrate (LOPRESSOR) tablet 25 mg  25 mg Oral Q12H    aspirin chewable tablet 81 mg  81 mg Oral DAILY    ticagrelor (BRILINTA) tablet 90 mg  90 mg Oral Q12H    ondansetron (ZOFRAN) injection 4 mg  4 mg IntraVENous Q4H PRN    docusate sodium (COLACE) capsule 100 mg  100 mg Oral BID PRN    0.9% sodium chloride infusion  75 mL/hr IntraVENous CONTINUOUS       Data Review: data included in this note has been independently reviewed by the author   CMP:   Lab Results   Component Value Date/Time     04/26/2021 04:56 AM    K 3.7 04/26/2021 04:56 AM     04/26/2021 04:56 AM    CO2 23 04/26/2021 04:56 AM    AGAP 10 04/26/2021 04:56 AM     (H) 04/26/2021 04:56 AM    BUN 16 04/26/2021 04:56 AM    CREA 1.01 04/26/2021 04:56 AM    GFRAA >60 04/26/2021 04:56 AM    GFRNA >60 04/26/2021 04:56 AM    CA 8.8 04/26/2021 04:56 AM    MG 2.2 04/26/2021 04:56 AM     CBC:   Lab Results   Component Value Date/Time    WBC 9.5 04/26/2021 04:56 AM    HGB 14.6 04/26/2021 04:56 AM    HCT 41.9 04/26/2021 04:56 AM     04/26/2021 04:56 AM        TELEMETRY: nsr    Assessment/Plan:     Active Problems:    Chronic kidney disease, stage II (mild) (5/29/2014)    Estimated Creatinine Clearance: 76.9 mL/min (based on SCr of 1.01 mg/dL).   monitor      Essential hypertension (8/7/2015)    The current medical regimen is effective;  continue present plan and medications. Dyslipidemia (8/7/2015)    The current medical regimen is effective;  continue present plan and medications. B[facility] (3/3/2017)           Controlled type 2 diabetes mellitus with stage 2 chronic kidney disease, with long-term current use of insulin ([facility] (1/11/2018)    The current medical regimen is effective;  continue present plan and medications.        ACS (acute coronary syndrome) ([facility] (4/24/2021)    Better after primary PCI      NSTEMI (non-ST elevated myocardial infarction) ([facility] (4/24/2021)     On OMT             Susi Whaley MD

## 2021-04-26 NOTE — ROUTINE PROCESS
Cardiac Rehab: Spoke with patient regarding referral to cardiac rehab. Patient meets admission criteria based on NSTEMI with PCI (4/24/21). Written information about Cardiac Rehab given and reviewed with patient. Discussed lifestyle modifications to promote cardiac wellness. Patient indicated that he does not want to participate in the cardiac rehab program but will consider participation. Pt encouraged to tour our program when in for his follow up appt. His Cardiologist is Dr. Yaritza Clark. Thank you, NANO AguilarN, RN Cardiopulmonary Rehabilitation Nurse Liaison [facility]

## 2021-04-26 NOTE — PROGRESS NOTES
TRANSFER - IN REPORT:    Verbal report received from [facility] on [address] [facility] being received from [address] [place] [address] for routine progression of care      Report consisted of patients Situation, Background, Assessment and Recommendations(SBAR). Information from the following report(s) Procedure Summary was reviewed with the receiving nurse. Opportunity for questions and clarification was provided. Assessment completed upon patients arrival to unit and care assumed.

## 2021-04-26 NOTE — PROGRESS NOTES
TRANSFER - OUT REPORT:    R radial planned PCI with Dr Chris Edwards  Versed 2 mg  Angiomax running at 31.5 ml/hr until finished  Stent to the LAD  TR band 12 ml  No bleeding or hematoma noted at site. Site soft    Verbal report given to Anu(name) on Kylee Powell  being transferred to [facility]) for routine progression of care       Report consisted of patients Situation, Background, Assessment and   Recommendations(SBAR). Information from the following report(s) Procedure Summary and MAR was reviewed with the receiving nurse. Lines:   Peripheral IV 04/24/21 Left Antecubital (Active)   Site Assessment Clean, dry, & intact 04/26/21 0824   Phlebitis Assessment 0 04/26/21 0824   Infiltration Assessment 0 04/26/21 0824   Dressing Status Clean, dry, & intact 04/26/21 0824   Dressing Type Transparent 04/26/21 0824   Hub Color/Line Status Patent 04/26/21 0415   Action Taken Open ports on tubing capped 04/24/21 1900   Alcohol Cap Used No 04/26/21 0415       Peripheral IV 04/24/21 Right Antecubital (Active)   Site Assessment Clean, dry, & intact 04/26/21 0824   Phlebitis Assessment 0 04/26/21 0824   Infiltration Assessment 0 04/26/21 0824   Dressing Status Clean, dry, & intact 04/26/21 0824   Dressing Type Transparent 04/26/21 0824   Hub Color/Line Status Patent 04/26/21 0415   Alcohol Cap Used No 04/26/21 0415        Opportunity for questions and clarification was provided.       Patient transported with:   Registered Nurse

## 2021-04-26 NOTE — PROCEDURES
Cardiac Catheterization Procedure Note    Patient ID:     Name: Ema Stephens   Medical Record Number: [id number]   YOB: 1950    Date of Procedure: 4/26/2021     Pre-procedure Diagnosis:  Coronary Artery Disease    Post-procedure Diagnosis: Coronary Artery Disease    Reason for Procedure: Stable Known CAD    Blood loss less than 5 ml    Sedation. Pt received 2 mg versed and 0 mcg fentanyl for monitored conscious sedation from 1030to 1050. independent trained observer to assist in the monitoring of the patient's level of consciousness and physiological status was present    Nurse cara    Specimen: None    No complications    No assistants    Time out, Mallampati, and ASA performed    Procedure:  After informed consent, patient was prepped and draped in the usual sterile fashion. Right radial approach was used. 80cc Visipaque contrast were utilized for the entire procedure. no closure device used        FINDINGS    Left Ventricle: 55 inferior hypokinesis  LVEDP: 22    Left Main:ok     Left Anterior descending coronary artery: diffuse 30% disease mid 90       Left Circumflex coronary artery: diffuse mild disease        Right coronary artery: not imaged            Graft anatomy: na    Intervention if done: 2.5x15 arash to mLAD    Conclusions: one vessel pci    Recommentations: dapt    No complications    Family and or significant other were sought out and discussion of the procedure and findings took place. Procedure and findings including pertinent sequele were discussed with the patient immediately post procedure. Opportunity to ask questions was offered. If no one was available in the post procedure waiting area, I can be reached thru paging system or at [phone].           Signed By: Lluvia Pelaez MD

## 2021-04-26 NOTE — DISCHARGE SUMMARY
[place] Cardiology Discharge Summary     Patient ID:  Jayne Magaña  [id number]  79 y.o.  1950    Admit date: 4/24/2021    Discharge date and time:  4-    Admitting Physician: Leonarda Lang MD     Discharge Physician: Ming Richardson PA-C/Dr. King    Admission Diagnoses: NSTEMI (non-ST elevated myocardial infarction) [facility]) [I21.4]    Discharge Diagnoses:   Patient Active Problem List    Diagnosis Date Noted    ACS (acute coronary syndrome) ([facility] 04/24/2021    NSTEMI (non-ST elevated myocardial infarction) ([facility] 04/24/2021    Erectile dysfunction 09/21/2020    Primary osteoarthritis of left knee 03/02/2020    Statin intolerance 03/02/2020    Gastroesophageal reflux disease without esophagitis 08/27/2019    Controlled type 2 diabetes mellitus with stage 2 chronic kidney disease, with long-term current use of insulin ([facility] 01/11/2018    History of CVA (cerebrovascular accident) 03/03/2017    PVC (premature ventricular contraction) 03/03/2017    Bilateral carotid artery disease ([facility] 03/03/2017    Chronic diastolic congestive heart failure ([facility] 02/23/2017    Syncope 02/11/2017    Essential hypertension 08/07/2015    Dyslipidemia 08/07/2015    Chronic kidney disease, stage II (mild) 05/29/2014       Cardiology Procedures this admission:  Left heart catheterization with PCI x2, echo  Consults: None    Hospital Course: Pt is a 79 y. o. WM who presented to [facility] ED with c/o chest pain, chest pressure/discomfort. Initial HS troponin was elevated and went up to 24,225.1. Pt was taken to the cath lab by Dr. Vicenta Sanchez on 4/24. Pt was found to have a occluded mRCA that was stented with three 2.5x 15 mm Beulah ANOOP and a 2.5 x 8 mm Beulah ANOOP with MIGUEL 3 flow and good result. Pt had noted 90% mLAD lesion that would be treated in staged procedure. Pt tolerated the procedure well and was taken to the telemetry floor for recovery. Echo showed EF 60-65%, hypokinesis of the basal-mid inferior wall(s).  Pt was up feeling well without any complaints of CP, SOB or palpitations. Pt was taken back to cath lab by Dr. Tracie Levine on Monday 4/26 for staged PCO to mLAD. Pt had 2.5 x 15 mm Fabrizio ANOOP placed with good result and MIGUEL 3 flow. Dr. Tracie Levine reported Pt can be DC today after bedrest completed and TR band removed. Pt's right radial cath site was clean, dry and intact without hematoma or bruit. Pt's labs were WNL. Pt was seen and examined by Dr. Tracie Levine and determined stable and ready for discharge. Pt was instructed on the importance of taking aspirin and Brilinta everyday without missing a dose. Pt will need to take dual antiplatelet therapy for at least one year. DISPOSITION: The patient is being discharged home in stable condition on a low saturated fat, low cholesterol and low salt diet. Pt is instructed to advance activities as tolerated limited to fatigue or shortness of breath. Pt is instructed to do no heavy lifting, straining, stooping or squatting for 1 week. Pt is instructed to watch cath site for bleeding/oozing, if seen Pt is instructed to apply firm pressure with clean cloth and call office at [phone]. Pt is instructed to watch for signs of infection which include increasing area of redness around site, fever/hot to touch or purulent drainage. Pt is instructed not to soak in a tub bath for 1 week, but it is okay to shower. Pt is instructed to call office or return to ER for immediate evaluation of any shortness of breath or chest pain not relieved by NTG. Follow up with [address] [place] [address] [address] Adeline Fiore on Monday May 10th at [facility] office for TC 14 appointment  Pt is being referred to out patient cardiac rehab. Discharge Exam:   Visit Vitals  /74   Pulse 67   Temp 98.3 °F (36.8 °C)   Resp 18   Ht 6' 1\" (1.854 m)   Wt 91.3 kg (201 lb 3.2 oz)   SpO2 95%   BMI 26.55 kg/m²   Pt has been seen by Dr. Tracie Levine: see his progress note for exam details.     Recent Results (from the past 24 hour(s)) GLUCOSE, POC    Collection Time: 04/25/21  3:38 PM   Result Value Ref Range    Glucose (POC) 112 (H) 65 - 100 mg/dL    Performed by Bebe    GLUCOSE, POC    Collection Time: 04/25/21  8:12 PM   Result Value Ref Range    Glucose (POC) 138 (H) 65 - 100 mg/dL    Performed by Ayse    MAGNESIUM    Collection Time: 04/26/21  4:56 AM   Result Value Ref Range    Magnesium 2.2 1.8 - 2.4 mg/dL   METABOLIC PANEL, BASIC    Collection Time: 04/26/21  4:56 AM   Result Value Ref Range    Sodium 138 136 - 145 mmol/L    Potassium 3.7 3.5 - 5.1 mmol/L    Chloride 105 98 - 107 mmol/L    CO2 23 21 - 32 mmol/L    Anion gap 10 7 - 16 mmol/L    Glucose 112 (H) 65 - 100 mg/dL    BUN 16 8 - 23 MG/DL    Creatinine 1.01 0.8 - 1.5 MG/DL    GFR est AA >60 >60 ml/min/1.73m2    GFR est non-AA >60 >60 ml/min/1.73m2    Calcium 8.8 8.3 - 10.4 MG/DL   CBC W/O DIFF    Collection Time: 04/26/21  4:56 AM   Result Value Ref Range    WBC 9.5 4.3 - 11.1 K/uL    RBC 4.34 4.23 - 5.6 M/uL    HGB 14.6 13.6 - 17.2 g/dL    HCT 41.9 41.1 - 50.3 %    MCV 96.5 79.6 - 97.8 FL    MCH 33.6 (H) 26.1 - 32.9 PG    MCHC 34.8 31.4 - 35.0 g/dL    RDW 11.7 (L) 11.9 - 14.6 %    PLATELET 972 179 - 358 K/uL    MPV 8.8 (L) 9.4 - 12.3 FL    ABSOLUTE NRBC 0.00 0.0 - 0.2 K/uL   GLUCOSE, POC    Collection Time: 04/26/21  6:53 AM   Result Value Ref Range    Glucose (POC) 104 (H) 65 - 100 mg/dL    Performed by Ayse          Patient Instructions:   Current Discharge Medication List      START taking these medications    Details   ticagrelor (BRILINTA) 90 mg tablet Take 1 Tab by mouth every twelve (12) hours every twelve (12) hours. Qty: 60 Tab, Refills: 11      nitroglycerin (NITROSTAT) 0.4 mg SL tablet 1 Tab by SubLINGual route every five (5) minutes as needed for Chest Pain. Up to 3 doses. Qty: 25 Tab, Refills: 11      metoprolol succinate (TOPROL-XL) 25 mg XL tablet Take 1 Tab by mouth daily.   Qty: 30 Tab, Refills: 3         CONTINUE these medications which have NOT CHANGED    Details   dulaglutide (Trulicity) 4.47 XG/2.5 mL sub-q pen 0.5 mL by SubCUTAneous route every seven (7) days. Qty: 4 Each, Refills: 3    Associated Diagnoses: Uncontrolled type 2 diabetes mellitus with hyperglycemia, with long-term current use of insulin ([facility])      pravastatin (PRAVACHOL) 20 mg tablet Take 1 Tab by mouth nightly. Qty: 30 Tab, Refills: 3    Associated Diagnoses: Dyslipidemia      ezetimibe (ZETIA) 10 mg tablet Take 1 Tab by mouth daily. Qty: 90 Tab, Refills: 3    Associated Diagnoses: Dyslipidemia      valsartan (DIOVAN) 320 mg tablet Take 1 Tab by mouth daily. Qty: 90 Tab, Refills: 1    Associated Diagnoses: Essential hypertension      flash glucose scanning reader (FreeStyle Froy 14 Day [place]) misc Use as directed to track blood sugars. Qty: 1 Each, Refills: 0    Associated Diagnoses: Controlled type 2 diabetes mellitus with stage 2 chronic kidney disease, with long-term current use of insulin ([facility])      flash glucose sensor (FreeStyle Froy 14 Day Sensor) kit 1 sensor every 2 weeks as directed to assess blood sugars. Qty: 2 Kit, Refills: 6    Associated Diagnoses: Controlled type 2 diabetes mellitus with stage 2 chronic kidney disease, with long-term current use of insulin ([facility])      aspirin 81 mg chewable tablet Take 1 Tab by mouth every twelve (12) hours.  Indications: DVT prevention  Qty: 60 Tab, Refills: 0      insulin degludec Buzzy Pi FlexTouch U-100) 100 unit/mL (3 mL) inpn INJECT 34 UNITS SUBCUTANEOUSLY NIGHTLY  Qty: 45 mL, Refills: 3    Associated Diagnoses: Controlled type 2 diabetes mellitus with stage 2 chronic kidney disease, with long-term current use of insulin ([facility])      metFORMIN (GLUCOPHAGE) 500 mg tablet TAKE 1 TABLET BY MOUTH TWICE DAILY WITH MEALS  Qty: 180 Tab, Refills: 3    Associated Diagnoses: Controlled type 2 diabetes mellitus with stage 2 chronic kidney disease, with long-term current use of insulin ([facility])      omeprazole (PRILOSEC) 20 mg capsule TAKE ONE CAPSULE BY MOUTH ONCE DAILY 30 MINUTES BEFORE A MEAL  Qty: 90 Cap, Refills: 3    Associated Diagnoses: Gastroesophageal reflux disease without esophagitis      varicella-zoster recombinant, PF, (Shingrix, PF,) 50 mcg/0.5 mL susr injection 0.5mL by IntraMUSCular route once now and then repeat in 2-6 months  Qty: 0.5 mL, Refills: 1    Associated Diagnoses: Encounter for immunization         STOP taking these medications       sildenafil citrate (VIAGRA) 100 mg tablet Comments:   Reason for Stopping:                 Signed:  Alex Figueroa PA-C  4/26/2021  11:22 AM

## 2021-04-26 NOTE — DISCHARGE INSTRUCTIONS
May resume Metformin 2 days after last heart cath    Percutaneous Coronary Intervention: What to Expect at [facility]  Percutaneous coronary intervention (PCI) is the name for procedures that are used to open a blocked coronary artery. The two most common PCI procedures are coronary angioplasty and coronary stent placement. Your groin or arm may have a bruise and feel sore for a day or two after a percutaneous coronary intervention (PCI). You can do light activities around the house, but nothing strenuous for several days. This care sheet gives you a general idea about how long it will take for you to recover. But each person recovers at a different pace. Follow the steps below to get better as quickly as possible. How can you care for yourself at home? Activity  · Do not do strenuous exercise and do not lift anything heavy until your doctor says it is okay. You can walk around the house and do light activity, such as cooking. · For 7-10 days after you go home, do not take baths. Showers are okay. · Try not to walk up stairs for the first couple of days (if the catheter was placed in the artery in your groin). · Go back to regular exercise after seen by cardiologist. Exercise is good for your heart. Get at least 30 minutes of physical activity on most days of the week. Walking is a good choice. If your doctor says it is okay, you also may want to do other activities, such as running, swimming, cycling, or playing tennis. Diet  · Drink plenty of fluids to help your body flush out the dye. If you have kidney, heart, or liver disease and have to limit fluids, talk with your doctor before you increase the amount of fluids you drink. · Keep eating a heart-healthy, low-fat diet that has lots of fruits, vegetables, and whole grains. If you have not been eating this way, talk to your doctor. You also may want to talk to a dietitian.  This expert can help you to learn about healthy foods and plan meals.  Medicines  · Your doctor may have prescribed a blood-thinning medicine like aspirin and/or Plavix. It is very important that you take these medicines daily exactly as directed in order to keep the coronary artery open and reduce your risk of a heart attack. · Call your doctor if you think you are having a problem with your medicine. Care of the catheter site  · For 1 or 2 days, you may keep a bandage over the spot where the catheter was inserted if needed. Most often, the bandage may be removed at discharge. · Put ice or a cold pack on the area for 10 to 15 minutes at a time to help with soreness or swelling. Put a thin cloth between the ice and your skin. Follow-up care is a key part of your treatment and safety. Be sure to make and go to all appointments, and call your doctor if you are having problems. Its also a good idea to know your test results. Keep an updated list of your medicines to show all medical providers. When should you call for help? Call 911 anytime you think you may need emergency care. For example, call if:  · You pass out (lose consciousness). · You have severe trouble breathing. · You have sudden chest pain and shortness of breath, or you cough up blood. · You have chest pain or pressure. This may occur with:  ¨ Sweating. ¨ Shortness of breath. ¨ Nausea or vomiting. ¨ Pain that spreads from the chest to the neck, jaw, or one or both shoulders or arms. ¨ Dizziness or lightheadedness. ¨ A fast or uneven pulse. After calling 911, chew 1 adult aspirin. Wait for an ambulance. Do not try to drive yourself. · You have been diagnosed with angina, and you have chest pain that does not go away with rest or is not getting better within 5 minutes after you take a dose of nitroglycerin. Call your doctor now or seek immediate medical care if:  · You are bleeding from the area where the catheter was put in your artery.   · You have signs of infection, such as:  ¨ Increased pain, swelling, warmth, or redness. ¨ Red streaks leading from the catheter site. ¨ Pus draining from the catheter site. ¨ Swollen lymph nodes in your neck, armpits, or groin. ¨ A fever. · Your leg or arm looks blue or feels cold, numb, or tingly. Watch closely for changes in your health, and be sure to contact your doctor if you have any problems. Where can you learn more? Go to [employer]  Enter Q742 in the search box to learn more about \"Percutaneous Coronary Intervention: What to Expect at Home\". This care instruction is for use with your licensed healthcare professional. If you have questions about a medical condition or this instruction, always ask your healthcare professional. Care instructions adapted by [facility] (which disclaims liability or warranty for this information) from [facility], [address] [place] [address]. [employer] disclaims any warranty or liability for your use of this information. Patient Education        Heart Attack: Care Instructions  Overview     A heart attack is an event that occurs when part of the heart muscle does not get enough blood and oxygen. This part of the heart starts to die. A heart attack is also called a myocardial infarction, or MI. A heart attack most often happens because blood flow through one or more of the coronary arteries is blocked. This blockage is usually caused by a blood clot that forms when plaque in the artery breaks open. After a heart attack, you may be worried about your future. Over the next several weeks, your heart will start to heal. Though it can be hard to break old habits, you can reduce your risk of having another heart attack. You can do this by making some lifestyle changes and by taking medicines. Follow-up care is a key part of your treatment and safety. Be sure to make and go to all appointments, and call your doctor if you are having problems.  It's also a good idea to know your test results and keep a list of the medicines you take. How can you care for yourself at home? Activity    · Until your doctor says it is okay, do not do strenuous exercise. And do not lift, pull, or push anything heavy. Ask your doctor what types of activities are safe for you.     · If your doctor has not set you up with a cardiac rehabilitation (rehab) program, talk to him or her about whether that is right for you. Cardiac rehab includes supervised exercise. It also includes help with diet and lifestyle changes and emotional support. It may reduce your risk of future heart problems.     · Increase your activities slowly. Take short rest breaks when you get tired.     · If your doctor recommends it, get more exercise. Walking is a good choice. Bit by bit, increase the amount you walk every day. Try for at least 30 minutes on most days of the week. You also may want to swim, bike, or do other activities. Talk with your doctor before you start an exercise program to make sure it is safe for you.     · Ask your doctor when you can drive, go back to work, and do other daily activities again.     · You can have sex as soon as you feel ready for it. Often this means when you can easily walk around or climb stairs. Talk with your doctor if you have any concerns. If you are taking nitroglycerin, do not take erection-enhancing medicine such as sildenafil (Viagra), tadalafil (Cialis), or vardenafil (Levitra) . Lifestyle changes    · Do not smoke. Smoking increases your risk of another heart attack. If you need help quitting, talk to your doctor about stop-smoking programs and medicines. These can increase your chances of quitting for good.     · Eat a heart-healthy diet that is low in saturated fat and salt, and is full of fruits, vegetables and whole-grains. You may get more details about how to eat healthy. But these tips can help you get started.     · Stay at a healthy weight, or lose weight if you need to.    Medicines    · Be safe with medicines. Take your medicines exactly as prescribed. Call your doctor if you think you are having a problem with your medicine. You will get more details on the specific medicines your doctor prescribes. Do not stop taking your medicine unless your doctor tells you to. Not taking your medicine might raise your risk of having another heart attack.     · You may need several medicines to help lower your risk of another heart attack. These include:  ? Blood pressure medicines such as angiotensin-converting enzyme (ACE) inhibitors, ARBs (angiotensin II receptor blockers), and beta-blockers. ? Cholesterol medicine called statins. ? Aspirin and other blood thinners. These prevent blood clots that can cause a heart attack.     · If your doctor has given you nitroglycerin, keep it with you at all times. If you have angina symptoms, such as chest pain or pressure, sit down and rest. Take the first dose of nitroglycerin as directed. If symptoms get worse or are not getting better within 5 minutes, call 911 right away. Stay on the phone. The emergency  will tell you what to do.     · Do not take any over-the-counter medicines, vitamins, or herbal products without talking to your doctor first.   Staying healthy    · Manage other health conditions such as high blood pressure and diabetes.     · Avoid colds and flu. Get a pneumococcal vaccine shot. If you have had one before, ask your doctor whether you need another dose. Get the flu vaccine every year. If you must be around people with colds or flu, wash your hands often.     · Be sure to tell your doctor about any angina symptoms you have had, even if they went away. Pay attention to your angina symptoms. Know what is typical for you and learn how to control it. Know when to call for help.     · Talk to your family, friends, or a counselor about your feelings. It is normal to feel frightened, angry, hopeless, helpless, and even guilty.  Talking openly about bad feelings can help you cope. If you have symptoms of depression, talk to your doctor. When should you call for help? Call 911 anytime you think you may need emergency care. For example, call if:    · You have symptoms of a heart attack. These may include:  ? Chest pain or pressure, or a strange feeling in the chest.  ? Sweating. ? Shortness of breath. ? Nausea or vomiting. ? Pain, pressure, or a strange feeling in the back, neck, jaw, or upper belly or in one or both shoulders or arms. ? Lightheadedness or sudden weakness. ? A fast or irregular heartbeat. After you call 911, the  may tell you to chew 1 adult-strength or 2 to 4 low-dose aspirin. Wait for an ambulance. Do not try to drive yourself.     · You have angina symptoms (such as chest pain or pressure) that do not go away with rest or are not getting better within 5 minutes after you take a dose of nitroglycerin.     · You passed out (lost consciousness).     · You feel like you are having another heart attack. Call your doctor now or seek immediate medical care if:    · You are having angina symptoms, such as chest pain or pressure, more often than usual, or the symptoms are different or worse than usual.     · You have new or increased shortness of breath.     · You are dizzy or lightheaded, or you feel like you may faint. Watch closely for changes in your health, and be sure to contact your doctor if you have any problems. Where can you learn more? Go to http://www.gray.com/  Enter H564 in the search box to learn more about \"Heart Attack: Care Instructions. \"  Current as of: August 31, 2020               Content Version: 12.8  © 9975-4750 [employer]. Care instructions adapted under license by [employer] (which disclaims liability or warranty for this information).  If you have questions about a medical condition or this instruction, always ask your healthcare professional. [employer] disclaims any warranty or liability for your use of this information. Patient Education   Metoprolol (By mouth)   Metoprolol (met-oh-PROE-lol)  Treats high blood pressure, angina (chest pain), and heart failure. May lower the risk of death after a heart attack. This medicine is a beta-blocker. Brand Name(s): Lopressor, Toprol XL   There may be other brand names for this medicine. When This Medicine Should Not Be Used: This medicine is not right for everyone. Do not use if you had an allergic reaction to metoprolol or similar medicines. Do not use this medicine if you have certain blood circulation or heart problems. Ask your doctor about these problems. How to Use This Medicine:   Tablet, Long Acting Tablet  · Take your medicine as directed. Your dose may need to be changed several times to find what works best for you. · Take this medicine with a meal or right after a meal. Take this medicine the same way every day, at the same time. · Swallow the tablet whole with a glass of water. You may break the extended-release tablet in half, but do not chew or crush it. · Missed dose: Take a dose as soon as you remember. If it is almost time for your next dose, wait until then and take a regular dose. Do not take extra medicine to make up for a missed dose. · Store the medicine in a closed container at room temperature, away from heat, moisture, and direct light. Drugs and Foods to Avoid:   Ask your doctor or pharmacist before using any other medicine, including over-the-counter medicines, vitamins, and herbal products. · Some medicines can affect how metoprolol works.  Tell your doctor if you are taking any of the following:   ¨ Digoxin, dipyridamole, hydralazine, hydroxychloroquine, methyldopa, quinidine  ¨ Medicine to treat depression (such as bupropion, clomipramine, desipramine, fluoxetine, fluvoxamine, paroxetine, sertraline), medicine to treat mental illness (such as chlorpromazine, fluphenazine, haloperidol, thioridazine), medicine for heart rhythm problems (such as propafenone), HIV/AIDS medicine (such as ritonavir), medicine to treat a fungus infection (such as terbinafine), a monoamine oxidase inhibitor (MAOI), an ergot medicine for headaches, a calcium channel blocker (such as amlodipine, diltiazem, verapamil), or an alpha blocker (such as clonidine, prazosin, reserpine, guanethidine)  Warnings While Using This Medicine:   · Tell your doctor if you are pregnant or breastfeeding, or if you have blood vessel, heart, or circulation problems (such as heart failure, rhythm problems, or slow heartbeat). Tell your doctor if you have kidney disease, liver disease, diabetes, lung disease (such as asthma), an overactive thyroid, or a history of allergies. · This medicine may cause worse symptoms of heart failure while the dose is being adjusted. · Do not stop using this medicine suddenly. Your doctor will need to slowly decrease your dose before you stop it completely. · Tell any doctor or dentist who treats you that you are using this medicine. You may need to stop using this medicine several days before you have surgery or medical tests. · This medicine could lower your blood pressure too much, especially when you first use it or if you are dehydrated. Stand or sit up slowly if you feel lightheaded or dizzy. · This medicine may make you dizzy or drowsy. Do not drive or do anything else that could be dangerous until you know how this medicine affects you. · Your doctor will check your progress and the effects of this medicine at regular visits. Keep all appointments. · Keep all medicine out of the reach of children. Never share your medicine with anyone.   Possible Side Effects While Using This Medicine:   Call your doctor right away if you notice any of these side effects:  · Allergic reaction: Itching or hives, swelling in your face or hands, swelling or tingling in your mouth or throat, chest tightness, trouble breathing  · Lightheadedness, dizziness, or fainting  · Slow heartbeat  · Swelling in your hands, ankles, or feet, trouble breathing, tiredness  · Worsening chest pain  If you notice these less serious side effects, talk with your doctor:   · Diarrhea  · Mild dizziness or tiredness  If you notice other side effects that you think are caused by this medicine, tell your doctor. Call your doctor for medical advice about side effects. You may report side effects to FDA at 0-406-LAD-2399  © 2017 [facility] Information is for End User's use only and may not be sold, redistributed or otherwise used for commercial purposes. The above information is an  only. It is not intended as medical advice for individual conditions or treatments. Talk to your doctor, nurse or pharmacist before following any medical regimen to see if it is safe and effective for you. Patient Education   Nitroglycerin (By mouth)   Nitroglycerin (zgy-wana-OUSY-er-in)  Treats or prevents angina (chest pain). This medicine is a nitrate. Brand Name(s): Nitro-Time, Nitrostat   There may be other brand names for this medicine. When This Medicine Should Not Be Used: This medicine is not right for everyone. Do not use it if you had an allergic reaction to nitroglycerin or similar medicines. How to Use This Medicine:   Long Acting Capsule, Tablet, Long Acting Tablet  · Your doctor will tell you how much medicine to use. Do not use more than directed. Sit down before you take this medicine, because it could make you lightheaded. · Most people use this medicine for only part of the day or as needed. · Extended-release capsule or extended-release tablet:   ¨ Take on an empty stomach with a full glass of water. ¨ Swallow whole. Do not crush, break, or chew it. · Buccal tablet:   ¨ Place it between your gum and upper cheek or upper lip.  Let the tablet dissolve slowly in your mouth over several hours. Do not chew, crush, or swallow the tablet or put it under your tongue. ¨ Avoid drinking anything hot while the tablet is in your mouth and do not touch the tablet with your tongue. ¨ Do not go to sleep with a buccal tablet in your mouth. · Sublingual tablet:   ¨ Wet the tablet with saliva and place it under your tongue or inside your cheek. Let the tablet dissolve. Do not chew, crush, or swallow the tablet. Wait 5 minutes. If you still have pain, take a second tablet. Do not take more than 3 tablets in 15 minutes. If you still have pain after you take a total of 3 tablets, this is an emergency. Call 911. Do not drive yourself to the hospital.  ¨ You may use this medicine 5 to 10 minutes before an activity that can cause angina. This may help prevent the attack. · Read and follow the patient instructions that come with this medicine. Talk to your doctor or pharmacist if you have any questions. · Missed dose: Take a dose as soon as you remember. If it is almost time for your next dose, wait until then and take a regular dose. Do not take extra medicine to make up for a missed dose. · Store the medicine in a closed container at room temperature, away from heat, moisture, and direct light. Store the sublingual tablets at room temperature in the original glass container, away from heat, moisture, and direct light. How to Store and Dispose of This Medicine:   · Store the medicine at room temperature in a closed container, away from heat, moisture, and direct light. Ask your pharmacist, doctor, or health caregiver about the best way to dispose of any outdated medicine or medicine no longer needed. · Keep all medicine out of the reach of children and never share your medicine with anyone. Drugs and Foods to Avoid:   Ask your doctor or pharmacist before using any other medicine, including over-the-counter medicines, vitamins, and herbal products.   · Do not use this medicine if you are also using avanafil, riociguat, sildenafil, tadalafil, or vardenafil. · Some medicines can affect how nitroglycerin works. Tell your doctor if you are using any of the following:  ¨ Alteplase, aspirin, heparin  ¨ Blood pressure medicine  ¨ Diuretic (water pill)  ¨ Ergot medicine  · Tell your doctor if you are using any medicine that makes your mouth dry, such as medicines that treat depression. · Do not drink alcohol while you are using this medicine. Warnings While Using This Medicine:   · Tell your doctor if you are pregnant or breastfeeding, or if you have kidney disease, anemia, congestive heart failure, enlarged heart, low blood pressure, or a recent heart attack. Tell your doctor if you have a history of a head injury. · This medicine may make you dizzy or lightheaded. Do not drive or do anything else that could be dangerous until you know how this medicine affects you. These symptoms may be worse if you drink alcohol. · Medicines that treat chest pain sometimes cause headaches when you first start using it. This is normal. Do not stop using the medicine or change the time you use it to avoid headaches. Ask your doctor if you can take aspirin or acetaminophen to treat the headache. · Tell any doctor or dentist who treats you that you are using this medicine. This medicine may affect certain medical test results. · Keep all medicine out of the reach of children. Never share your medicine with anyone.   Possible Side Effects While Using This Medicine:   Call your doctor right away if you notice any of these side effects:  · Allergic reaction: Itching or hives, swelling in your face or hands, swelling or tingling in your mouth or throat, chest tightness, trouble breathing  · Blurred vision, dry mouth  · Increased chest pain, fast or slow heartbeat  · Severe or ongoing dizziness, lightheadedness, or fainting  · Throbbing, severe, or ongoing headache, confusion, low fever, or trouble seeing  · Trouble breathing, cold sweat, blue skin, lips, or nails  · Warmth or redness in your face, neck, arms, or upper chest  If you notice these less serious side effects, talk with your doctor:   · Nausea, vomiting, weakness  If you notice other side effects that you think are caused by this medicine, tell your doctor. Call your doctor for medical advice about side effects. You may report side effects to FDA at 4-090-DXC-8763  © 2017 [facility] Information is for End User's use only and may not be sold, redistributed or otherwise used for commercial purposes. The above information is an  only. It is not intended as medical advice for individual conditions or treatments. Talk to your doctor, nurse or pharmacist before following any medical regimen to see if it is safe and effective for you. Patient Education   Ticagrelor (By mouth)   Ticagrelor (kew-XX-feyx-or)  Helps prevent stroke, heart attack, and other heart problems. This medicine is a blood thinner. Brand Name(s): Sonia   There may be other brand names for this medicine. When This Medicine Should Not Be Used: This medicine is not right for everyone. Do not use it if you had an allergic reaction to ticagrelor, or if you have bleeding problems (such as a bleeding stomach ulcer) or a history of bleeding in your brain. How to Use This Medicine:   Tablet  · Your doctor will tell you how much medicine to use. Do not use more than directed. Take this medicine at the same time every day. · Your doctor may tell you to take aspirin with this medicine. Do not use more than 100 milligrams of aspirin per day. Check the labels of other medicines to make sure they do not contain aspirin. · If you cannot swallow the tablet, you may do this:   ¨ Crush the tablet and mix it in a glass of water. Drink it right away. Rinse the glass with more water and drink that too, so you get all the medicine.   ¨ You may give the tablet and water mixture through a nasogastric tube. Flush the tube with more water so you receive all the medicine. · This medicine should come with a Medication Guide. Ask your pharmacist for a copy if you do not have one. · Missed dose: Skip the missed dose and take your next dose as usual. Do not take extra medicine to make up for a missed dose. · Store the medicine in a closed container at room temperature, away from heat, moisture, and direct light. Drugs and Foods to Avoid:   Ask your doctor or pharmacist before using any other medicine, including over-the-counter medicines, vitamins, and herbal products. · Some medicines can affect how ticagrelor works. Tell your doctor if you are using any of the following:  ¨ Atazanavir, carbamazepine, clarithromycin, digoxin, indinavir, itraconazole, ketoconazole, lovastatin, nefazodone, nelfinavir, phenobarbital, phenytoin, rifampin, ritonavir, saquinavir, simvastatin, telithromycin, or voriconazole  ¨ Blood thinner (including warfarin or heparin)  ¨ NSAID pain or arthritis medicine (including celecoxib, diclofenac, ibuprofen, naproxen)  Warnings While Using This Medicine:   · Tell your doctor if you are pregnant or breastfeeding, or if you have liver disease, heart rhythm problems (including slow heartbeat), lung or breathing problems (such as asthma or COPD), or a history of bleeding problems. · This medicine may cause you to bleed and bruise more easily, and it may take longer than usual for bleeding to stop. Be careful to avoid injuries. · Do not stop using this medicine unless your doctor tells you to. To stop it may increase your risk of a heart attack, blood clot, or other serious problem. · Tell any doctor or dentist who treats you that you are using this medicine. With your doctor's permission, you may need to stop using this medicine several days before you have surgery to reduce the risk of bleeding problems. Follow your doctor's instructions carefully.   · Your doctor will do lab tests at regular visits to check on the effects of this medicine. Keep all appointments. · Keep all medicine out of the reach of children. Never share your medicine with anyone. Possible Side Effects While Using This Medicine:   Call your doctor right away if you notice any of these side effects:  · Allergic reaction: Itching or hives, swelling in your face or hands, swelling or tingling in your mouth or throat, chest tightness, trouble breathing  · Bloody or black, tarry stools, red or dark brown urine  · Fast, slow, or pounding heartbeat  · Trouble breathing  · Unusual bleeding, bruising, or weakness  · Vomiting of blood or material that looks like coffee grounds  If you notice other side effects that you think are caused by this medicine, tell your doctor. Call your doctor for medical advice about side effects. You may report side effects to FDA at 2-557-FDA-2853  © 2017 [facility] Information is for End User's use only and may not be sold, redistributed or otherwise used for commercial purposes. The above information is an  only. It is not intended as medical advice for individual conditions or treatments. Talk to your doctor, nurse or pharmacist before following any medical regimen to see if it is safe and effective for you.

## 2021-04-26 NOTE — RESEARCH NURSE
Pt. Awake alert and oriented x3. Wife at bedside. Patient approached for the AEGIS II Study at Dr. Ekta Mason  request.  ICF presented and reviewed in detail and left with patient to read and consider. After thorough review of the ICF and discussion the patient was able to verbalize understanding of the study purpose, design, risks/benefits, alternatives and costs. Patient understands that study participation is voluntary and he can withdraw from the study at any time. Patient and wife will read over the ICF and consider. My contact information was provided and they were encouraged to call me with any questions or concerns.

## 2021-04-26 NOTE — PROGRESS NOTES
Care Management Interventions  PCP Verified by CM: Yes(Dr Ceballos)  Last Visit to PCP: 03/26/21  Mode of Transport at Discharge: Other (see comment)(Adri Magaña Spouse [phone] )  Transition of Care Consult (CM Consult): Discharge Planning  Discharge Durable Medical Equipment: No  Occupational Therapy Consult: No  Current Support Network: Lives with Spouse, Own Home  Confirm Follow Up Transport: Family  Discharge Location  Discharge Placement: Home    CM received a consult for possible discharge needs. CM met with pt and his spouse in pt's room. Pt is s/p PCI to LAD 4/26. Pt's demographic, insurance, and PCP verified. Pt lives with his spouse. Pt is able to complete all ADLs. Pt has no home DMEs. Spouse here to transport pt home. No CM needs identified at discharge.

## 2021-04-27 NOTE — PROCEDURES
[facility]  CARDIAC CATH    Name:  Sony Darden  MR#:  [id number]  :  1950  ACCOUNT #:  [de-identified]  DATE OF SERVICE:  2021      PROCEDURES PERFORMED:  Left heart cath, selective coronary angiography, percutaneous intervention to mid LAD. INDICATION:  Known CAD. PREOPERATIVE DIAGNOSIS:  Coronary artery disease. POSTOPERATIVE DIAGNOSIS:  Successful stenting of left anterior descending. SURGEON:  Sonia King MD    ASSISTANT:  na.    ESTIMATED BLOOD LOSS:  3cc. SPECIMENS REMOVED:  na.    COMPLICATIONS:  None. IMPLANTS:  One drug-eluting stent. ANESTHESIA:  Sedation start time 10:32, finish time 10:53, 2 mg of Versed were given intravenously by Bob Bro RN, who monitored the patient's response. Aortic pressure 87/58. LVEDP 20. CONTRAST:  Total contrast used 80 mL. EQUIPMENT:  Aubrey left 3.5 guide, Prowater wire 2.5 x 15 mm Resolute Claremont stent. ACCESS:  Right radial access was used. PROCEDURE:  The patient underwent standard protocol access from the right radial artery. Imaging done with a JL-3.5 guide demonstrates the previously identified 90% mid-LAD lesion. There was diffuse mild disease in the proximal and distal LAD. There was diffuse mild disease in the circumflex. The high-grade mid to mid distal LAD is the target of this staged intervention. The patient was given therapeutic dose of Angiomax. Prowater wire was then placed in the distal LAD and direct stenting was able to be performed with a 2.5 x 15 Claremont stent with excellent results and no residual.  There was MIGUEL III flow. Left ventriculogram done at the end of the procedure showed inferior basal hypokinesis, but overall EF 55% to 60% with an LVEDP of 20. CONCLUSION:  Successful stenting of mid LAD with a 2.5 x 15 Claremont drug-eluting stent. The patient will be continued on dual antiplatelet therapy.       Mp Caicedo MD      [id number]  D: 04/26/2021 11:10  T:  04/26/2021 23:35  JOB #:  [id number]

## 2022-01-10 PROBLEM — G89.29 CHRONIC PAIN OF RIGHT ANKLE: Status: ACTIVE | Noted: 2022-01-10

## 2022-01-10 PROBLEM — M25.571 CHRONIC PAIN OF RIGHT ANKLE: Status: ACTIVE | Noted: 2022-01-10

## 2022-03-18 PROBLEM — K21.9 GASTROESOPHAGEAL REFLUX DISEASE WITHOUT ESOPHAGITIS: Status: ACTIVE | Noted: 2019-08-27

## 2022-03-18 PROBLEM — G89.29 CHRONIC PAIN OF RIGHT ANKLE: Status: ACTIVE | Noted: 2022-01-10

## 2022-03-18 PROBLEM — R55 SYNCOPE: Status: ACTIVE | Noted: 2017-02-11

## 2022-03-18 PROBLEM — I21.4 NSTEMI (NON-ST ELEVATED MYOCARDIAL INFARCTION) (HCC): Status: ACTIVE | Noted: 2021-04-24

## 2022-03-18 PROBLEM — M25.571 CHRONIC PAIN OF RIGHT ANKLE: Status: ACTIVE | Noted: 2022-01-10

## 2022-03-19 PROBLEM — N18.2 CONTROLLED TYPE 2 DIABETES MELLITUS WITH STAGE 2 CHRONIC KIDNEY DISEASE, WITH LONG-TERM CURRENT USE OF INSULIN (HCC): Status: ACTIVE | Noted: 2018-01-11

## 2022-03-19 PROBLEM — N52.9 ERECTILE DYSFUNCTION: Status: ACTIVE | Noted: 2020-09-21

## 2022-03-19 PROBLEM — I50.32 CHRONIC DIASTOLIC CONGESTIVE HEART FAILURE (HCC): Status: ACTIVE | Noted: 2017-02-23

## 2022-03-19 PROBLEM — E11.22 CONTROLLED TYPE 2 DIABETES MELLITUS WITH STAGE 2 CHRONIC KIDNEY DISEASE, WITH LONG-TERM CURRENT USE OF INSULIN (HCC): Status: ACTIVE | Noted: 2018-01-11

## 2022-03-19 PROBLEM — I24.9 ACS (ACUTE CORONARY SYNDROME) (HCC): Status: ACTIVE | Noted: 2021-04-24

## 2022-03-19 PROBLEM — Z79.4 CONTROLLED TYPE 2 DIABETES MELLITUS WITH STAGE 2 CHRONIC KIDNEY DISEASE, WITH LONG-TERM CURRENT USE OF INSULIN (HCC): Status: ACTIVE | Noted: 2018-01-11

## 2022-03-19 PROBLEM — Z86.73 HISTORY OF CVA (CEREBROVASCULAR ACCIDENT): Status: ACTIVE | Noted: 2017-03-03

## 2022-03-19 PROBLEM — I49.3 PVC (PREMATURE VENTRICULAR CONTRACTION): Status: ACTIVE | Noted: 2017-03-03

## 2022-03-20 PROBLEM — M17.12 PRIMARY OSTEOARTHRITIS OF LEFT KNEE: Status: ACTIVE | Noted: 2020-03-02

## 2022-03-20 PROBLEM — I77.9 BILATERAL CAROTID ARTERY DISEASE (HCC): Status: ACTIVE | Noted: 2017-03-03

## 2022-03-20 PROBLEM — Z78.9 STATIN INTOLERANCE: Status: ACTIVE | Noted: 2020-03-02

## 2022-06-10 NOTE — TELEPHONE ENCOUNTER
Pt called requesting a refill on his Brilinta. Pt states he is completely out and was prescribed this in the hospital. Asked the patient if he has seen Cardiology since his Hospital visit, Pt states he has not.

## 2022-08-08 NOTE — TELEPHONE ENCOUNTER
[address] called. Requesting refill for Brilinta. Pt was last seen 02/14/22. Pt has an appt scheduled 8/30/22.

## 2022-08-30 ENCOUNTER — OFFICE VISIT (OUTPATIENT)
Dept: FAMILY MEDICINE CLINIC | Facility: CLINIC | Age: 72
End: 2022-08-30
Payer: MEDICARE

## 2022-08-30 ENCOUNTER — NURSE ONLY (OUTPATIENT)
Dept: FAMILY MEDICINE CLINIC | Facility: CLINIC | Age: 72
End: 2022-08-30

## 2022-08-30 VITALS
SYSTOLIC BLOOD PRESSURE: 148 MMHG | BODY MASS INDEX: 27.77 KG/M2 | TEMPERATURE: 97.8 F | OXYGEN SATURATION: 93 % | HEART RATE: 99 BPM | HEIGHT: 73 IN | RESPIRATION RATE: 18 BRPM | WEIGHT: 209.5 LBS | DIASTOLIC BLOOD PRESSURE: 90 MMHG

## 2022-08-30 DIAGNOSIS — R35.1 NOCTURIA: ICD-10-CM

## 2022-08-30 DIAGNOSIS — I10 UNCONTROLLED HYPERTENSION: ICD-10-CM

## 2022-08-30 DIAGNOSIS — E11.65 UNCONTROLLED TYPE 2 DIABETES MELLITUS WITH HYPERGLYCEMIA (HCC): ICD-10-CM

## 2022-08-30 DIAGNOSIS — E11.65 UNCONTROLLED TYPE 2 DIABETES MELLITUS WITH HYPERGLYCEMIA (HCC): Primary | ICD-10-CM

## 2022-08-30 DIAGNOSIS — I65.23 BILATERAL CAROTID ARTERY STENOSIS: ICD-10-CM

## 2022-08-30 DIAGNOSIS — I50.32 CHRONIC DIASTOLIC (CONGESTIVE) HEART FAILURE (HCC): ICD-10-CM

## 2022-08-30 DIAGNOSIS — I25.10 ASCVD (ARTERIOSCLEROTIC CARDIOVASCULAR DISEASE): ICD-10-CM

## 2022-08-30 DIAGNOSIS — E11.43 DIABETIC AUTONOMIC NEUROPATHY ASSOCIATED WITH TYPE 2 DIABETES MELLITUS (HCC): ICD-10-CM

## 2022-08-30 LAB — PSA SERPL-MCNC: 4 NG/ML

## 2022-08-30 PROCEDURE — G8417 CALC BMI ABV UP PARAM F/U: HCPCS | Performed by: FAMILY MEDICINE

## 2022-08-30 PROCEDURE — 99214 OFFICE O/P EST MOD 30 MIN: CPT | Performed by: FAMILY MEDICINE

## 2022-08-30 PROCEDURE — G8427 DOCREV CUR MEDS BY ELIG CLIN: HCPCS | Performed by: FAMILY MEDICINE

## 2022-08-30 PROCEDURE — 3052F HG A1C>EQUAL 8.0%<EQUAL 9.0%: CPT | Performed by: FAMILY MEDICINE

## 2022-08-30 PROCEDURE — 1123F ACP DISCUSS/DSCN MKR DOCD: CPT | Performed by: FAMILY MEDICINE

## 2022-08-30 PROCEDURE — 3017F COLORECTAL CA SCREEN DOC REV: CPT | Performed by: FAMILY MEDICINE

## 2022-08-30 PROCEDURE — 2022F DILAT RTA XM EVC RTNOPTHY: CPT | Performed by: FAMILY MEDICINE

## 2022-08-30 PROCEDURE — 4004F PT TOBACCO SCREEN RCVD TLK: CPT | Performed by: FAMILY MEDICINE

## 2022-08-30 RX ORDER — METOPROLOL SUCCINATE 25 MG/1
25 TABLET, EXTENDED RELEASE ORAL DAILY
Qty: 90 TABLET | Refills: 0 | Status: SHIPPED | OUTPATIENT
Start: 2022-08-30 | End: 2022-11-01 | Stop reason: SDUPTHER

## 2022-08-30 RX ORDER — GABAPENTIN 300 MG/1
CAPSULE ORAL
Qty: 90 CAPSULE | Refills: 1 | Status: SHIPPED | OUTPATIENT
Start: 2022-08-30 | End: 2023-02-23

## 2022-08-30 RX ORDER — INSULIN DEGLUDEC INJECTION 100 U/ML
INJECTION, SOLUTION SUBCUTANEOUS
Qty: 30 ML | Refills: 5 | Status: CANCELLED | OUTPATIENT
Start: 2022-08-30

## 2022-08-30 RX ORDER — GABAPENTIN 300 MG/1
CAPSULE ORAL
Qty: 90 CAPSULE | Refills: 0 | OUTPATIENT
Start: 2022-08-30

## 2022-08-30 RX ORDER — VALSARTAN 160 MG/1
160 TABLET ORAL DAILY
Qty: 90 TABLET | Refills: 0 | Status: SHIPPED | OUTPATIENT
Start: 2022-08-30 | End: 2022-11-01 | Stop reason: SDUPTHER

## 2022-08-30 NOTE — PROGRESS NOTES
syndrome) ([facility] 2021    Uncontrolled hypertension 2015    Chronic diastolic congestive heart failure ([facility] 2017    History of CVA (cerebrovascular accident) 2017    Erectile dysfunction 2020    Primary osteoarthritis of left knee 2020    Statin intolerance 2020    Bilateral carotid artery disease ([facility] 2017    Diabetic autonomic neuropathy associated with type 2 diabetes mellitus ([facility] 2022    Uncontrolled type 2 diabetes mellitus with hyperglycemia ([facility] 2022     Resolved Ambulatory Problems     Diagnosis Date Noted    No Resolved Ambulatory Problems     Past Medical History:   Diagnosis Date    Acute myocardial infarction, subendocardial infarction, initial episode of care ([facility] 2014    Adverse effect of anesthesia     CAD (coronary artery disease)     Diabetes (HCC)     HLD (hyperlipidemia) 2015    Right ankle pain     Tobacco use disorder 2014        Past Surgical History:   Procedure Laterality Date    COLONOSCOPY  10/16/15    Dr. Jason Spann; colon polyp; repeat 5 yrs    CORONARY ANGIOPLASTY WITH STENT PLACEMENT  21 & 21    Occluded mRCA:3 2.5 X 15 [place] AZUCENA& 1 2.5 X 8 [place] AZUCENA. 90% mLAD:2.5 x 15 OnyxDES    LEFT HEART CATH,PERCUTANEOUS  2014    xience to [address] Right 10/20/2020    Dr. Ela Duran; ankle ORIF    ORTHOPEDIC SURGERY  20 yrs ago    bilateral shoulder scope    UPPER GASTROINTESTINAL ENDOSCOPY  10/16/15    Dr. Jason Spann; Grade A esophagitis        Social History     Tobacco Use    Smoking status: Some Days     Types: Cigarettes     Last attempt to quit: 10/20/2020     Years since quittin.8    Smokeless tobacco: Never    Tobacco comments:     Quit smoking: did smoke 1ppd x 40 yrs-- now only occ   Substance Use Topics    Alcohol use:  Yes     Alcohol/week: 0.0 standard drinks    Drug use: No       Physical Exam:  Blood pressure (!) 148/90, pulse 99, temperature 97.8 °F (36.6 °C), temperature source Temporal, resp. rate 18, height 6' 1\" (1.854 m), weight 209 lb 8 oz (95 kg), SpO2 93 %. Pleasant male in no apparent distress. Affect is appropriate. Close smell of significant smoke. HEENT grossly normal.  Oral mucosa is moist and intact. No cervical lymphadenopathy or thyromegaly or nodularity. Lungs somewhat distant but with fair air exchange and no rhonchi, wheeze, crackles. No JVD or hepatojugular reflux. Cardiovascular regular S1-S2 without murmurs rubs or gallops. Radial pulses 2+. Abdomen is soft and nontender with positive bowel sounds. No masses palpated. No peripheral edema or cyanosis. Moves all extremities well. Assessment and Plan:   Diagnosis Orders   1. Uncontrolled type 2 diabetes mellitus with hyperglycemia (HCC)  Insulin Degludec 200 UNIT/ML SOPN    Hemoglobin A1C      2. Uncontrolled hypertension  valsartan (DIOVAN) 160 MG tablet    metoprolol succinate (TOPROL XL) 25 MG extended release tablet    [facility] Marisela Sanchez MD, Cardiology, [place]      3. Diabetic autonomic neuropathy associated with type 2 diabetes mellitus (HCC)  gabapentin (NEURONTIN) 300 MG capsule      4. Nocturia  PSA, Diagnostic      5. Chronic diastolic (congestive) heart failure (HCC)  ticagrelor (BRILINTA) 90 MG TABS tablet    GILLIAN Sanchez MD, Cardiology, [place]      6. ASCVD (arteriosclerotic cardiovascular disease)  ticagrelor (BRILINTA) 90 MG TABS tablet    metoprolol succinate (TOPROL XL) 25 MG extended release tablet    [facility] Marisela Sanchez MD, Cardiology, [place]      7. Bilateral carotid artery stenosis  [facility] - Karli Sanchez MD, Cardiology, [facility]        Video information on taking care of your heart with diabetes, moving forward with coronary artery disease, heart healthy diet, 3 steps to problem solving diabetes, and how to build her plate with diabetes provided.   Also information on avoiding heart failure triggers, diabetic neuropathy, hypertension, and quitting smoking provided. Reviewed with patient his May 2021 visit with Dr. Hayes Crane. Add back valsartan at 160 mg daily and Toprol-XL 25 mg daily. Refilled his Brilinta. Referred patient back to Dr. Hayes Crane for follow-up. Continue the Tresiba at 38 units daily but await nonfasting lab work from today. Recommend stopping smoking if patient is currently smoking any. Encouraged 150 minutes of low impact aerobic activity weekly. Also encouraged resistance training every other day with 24-48 hours of muscle glucose uptake subsequently. Plan reassessment here in 2 months or more quickly as needed. Discharge Meds:  Current Outpatient Medications   Medication Sig Dispense Refill    gabapentin (NEURONTIN) 300 MG capsule Take 1 capsule by mouth once daily 90 capsule 1    Insulin Degludec 200 UNIT/ML SOPN Inject 50 Units into the skin Daily with lunch 10 pen 3    ticagrelor (BRILINTA) 90 MG TABS tablet Take 1 tablet by mouth in the morning and 1 tablet in the evening. 180 tablet 0    valsartan (DIOVAN) 160 MG tablet Take 1 tablet by mouth daily 90 tablet 0    metoprolol succinate (TOPROL XL) 25 MG extended release tablet Take 1 tablet by mouth daily 90 tablet 0    aspirin 81 MG chewable tablet Take 81 mg by mouth every 12 hours      cyclobenzaprine (FLEXERIL) 5 MG tablet Take 5 mg by mouth 3 times daily as needed      metFORMIN (GLUCOPHAGE) 500 MG tablet Take 500 mg by mouth 2 times daily (with meals)      nitroGLYCERIN (NITROSTAT) 0.4 MG SL tablet Place 0.4 mg under the tongue      omeprazole (PRILOSEC) 20 MG delayed release capsule TAKE 1 CAPSULE BY MOUTH ONCE DAILY 30 MINUTES BEFORE A MEAL      pravastatin (PRAVACHOL) 20 MG tablet Take 20 mg by mouth      zoster recombinant adjuvanted vaccine [facility]) 50 MCG/0.5ML SUSR injection 0.5mL by IntraMUSCular route once now and then repeat in 2-6 months (Patient not taking: Reported on 8/30/2022)       No current facility-administered medications for this visit. Return in about 2 months (around 10/30/2022). Any new medications were explained today including any potential drug interactions or significant side effects. The patient's questions in regards to this were answered today. Dictated using voice recognition software.   Proofread, but unrecognized errors may exist.

## 2022-08-31 LAB
EST. AVERAGE GLUCOSE BLD GHB EST-MCNC: 197 MG/DL
HBA1C MFR BLD: 8.5 % (ref 4.8–5.6)

## 2022-09-02 DIAGNOSIS — E11.65 UNCONTROLLED TYPE 2 DIABETES MELLITUS WITH HYPERGLYCEMIA (HCC): Primary | ICD-10-CM

## 2022-09-02 RX ORDER — ORAL SEMAGLUTIDE 7 MG/1
1 TABLET ORAL DAILY
Qty: 30 TABLET | Refills: 2 | Status: SHIPPED | OUTPATIENT
Start: 2022-09-30 | End: 2022-11-01 | Stop reason: SDUPTHER

## 2022-10-21 ENCOUNTER — OFFICE VISIT (OUTPATIENT)
Dept: CARDIOLOGY CLINIC | Age: 72
End: 2022-10-21
Payer: MEDICARE

## 2022-10-21 VITALS
BODY MASS INDEX: 27.57 KG/M2 | DIASTOLIC BLOOD PRESSURE: 81 MMHG | HEART RATE: 62 BPM | SYSTOLIC BLOOD PRESSURE: 137 MMHG | HEIGHT: 73 IN | WEIGHT: 208 LBS

## 2022-10-21 DIAGNOSIS — I65.23 BILATERAL CAROTID ARTERY STENOSIS: ICD-10-CM

## 2022-10-21 DIAGNOSIS — I10 UNCONTROLLED HYPERTENSION: ICD-10-CM

## 2022-10-21 DIAGNOSIS — I21.4 NSTEMI (NON-ST ELEVATED MYOCARDIAL INFARCTION) (HCC): ICD-10-CM

## 2022-10-21 DIAGNOSIS — Z78.9 STATIN INTOLERANCE: ICD-10-CM

## 2022-10-21 DIAGNOSIS — I25.10 ATHEROSCLEROSIS OF NATIVE CORONARY ARTERY OF NATIVE HEART WITHOUT ANGINA PECTORIS: Primary | ICD-10-CM

## 2022-10-21 PROCEDURE — G8484 FLU IMMUNIZE NO ADMIN: HCPCS | Performed by: INTERNAL MEDICINE

## 2022-10-21 PROCEDURE — G8427 DOCREV CUR MEDS BY ELIG CLIN: HCPCS | Performed by: INTERNAL MEDICINE

## 2022-10-21 PROCEDURE — 93000 ELECTROCARDIOGRAM COMPLETE: CPT | Performed by: INTERNAL MEDICINE

## 2022-10-21 PROCEDURE — 4004F PT TOBACCO SCREEN RCVD TLK: CPT | Performed by: INTERNAL MEDICINE

## 2022-10-21 PROCEDURE — 3017F COLORECTAL CA SCREEN DOC REV: CPT | Performed by: INTERNAL MEDICINE

## 2022-10-21 PROCEDURE — 1123F ACP DISCUSS/DSCN MKR DOCD: CPT | Performed by: INTERNAL MEDICINE

## 2022-10-21 PROCEDURE — G8417 CALC BMI ABV UP PARAM F/U: HCPCS | Performed by: INTERNAL MEDICINE

## 2022-10-21 PROCEDURE — 99214 OFFICE O/P EST MOD 30 MIN: CPT | Performed by: INTERNAL MEDICINE

## 2022-10-21 ASSESSMENT — ENCOUNTER SYMPTOMS
HEMATEMESIS: 0
ORTHOPNEA: 0
WHEEZING: 0
COLOR CHANGE: 0
BLURRED VISION: 0
HOARSE VOICE: 0
SPUTUM PRODUCTION: 0
DIARRHEA: 0
ABDOMINAL PAIN: 0
SHORTNESS OF BREATH: 0
CHEST TIGHTNESS: 0
HEMATOCHEZIA: 0
BOWEL INCONTINENCE: 0

## 2022-10-21 NOTE — PROGRESS NOTES
[facility] CARDIOLOGY  [address], [address]  PHONE: [phone]        10/21/22        NAME:  Cadence Hwang  : 1950  MRN: [id number]       SUBJECTIVE:   Rosabel Lesch is a 67 y.o. male seen for a follow up visit regarding the following: The patient has a hx of CAD with hx of PCI with AZUCENA to RCA &LAD. He was last seen in 2021. Additional problems include primary hypertension,T2DM,carotid artery disease,and hyperlipidemia. Presently,he reports doing well with no complaints. Chief Complaint   Patient presents with    Coronary Artery Disease     Late yearly follow up       HPI:    Coronary Artery Disease  Presents for follow-up visit. Pertinent negatives include no chest pain, chest pressure, chest tightness, dizziness, leg swelling, muscle weakness, palpitations, shortness of breath or weight gain. The symptoms have been stable. Compliance with diet is good. Compliance with exercise is good. Compliance with medications is good. Past Medical History, Past Surgical History, Family history, Social History, and Medications were all reviewed with the patient today and updated as necessary.          Current Outpatient Medications:     ticagrelor (BRILINTA) 60 MG TABS tablet, Take 1 tablet by mouth 2 times daily, Disp: 180 tablet, Rfl: 3    Semaglutide (RYBELSUS) 7 MG TABS, Take 1 tablet by mouth daily, Disp: 30 tablet, Rfl: 2    gabapentin (NEURONTIN) 300 MG capsule, Take 1 capsule by mouth once daily, Disp: 90 capsule, Rfl: 1    Insulin Degludec 200 UNIT/ML SOPN, Inject 50 Units into the skin Daily with lunch, Disp: 10 pen, Rfl: 3    valsartan (DIOVAN) 160 MG tablet, Take 1 tablet by mouth daily, Disp: 90 tablet, Rfl: 0    metoprolol succinate (TOPROL XL) 25 MG extended release tablet, Take 1 tablet by mouth daily, Disp: 90 tablet, Rfl: 0    aspirin 81 MG chewable tablet, Take 81 mg by mouth every 12 hours, Disp: , Rfl:     cyclobenzaprine (FLEXERIL) 5 MG tablet, Take 5 mg by mouth 3 times daily as needed, Disp: , Rfl:     metFORMIN (GLUCOPHAGE) 500 MG tablet, Take 500 mg by mouth 2 times daily (with meals), Disp: , Rfl:     nitroGLYCERIN (NITROSTAT) 0.4 MG SL tablet, Place 0.4 mg under the tongue, Disp: , Rfl:     omeprazole (PRILOSEC) 20 MG delayed release capsule, TAKE 1 CAPSULE BY MOUTH ONCE DAILY 30 MINUTES BEFORE A MEAL, Disp: , Rfl:     pravastatin (PRAVACHOL) 20 MG tablet, Take 20 mg by mouth, Disp: , Rfl:     zoster recombinant adjuvanted vaccine [facility]) 50 MCG/0.5ML SUSR injection, 0.5mL by IntraMUSCular route once now and then repeat in 2-6 months, Disp: , Rfl:   Allergies   Allergen Reactions    Atorvastatin Other (See Comments)    Rosuvastatin Other (See Comments)    Simvastatin Other (See Comments)     Past Medical History:   Diagnosis Date    Acute myocardial infarction, subendocardial infarction, initial episode of care ([facility] 05/29/2014    NSTEMI-- stent x 1---2014-- followed by dr Feliz Kern    Adverse effect of anesthesia     slow to wake up per pt    Bilateral carotid artery disease ([facility] 03/03/2017    followed by dr Randolph Cerda    CAD (coronary artery disease)     mi  2014--- stent x 1--- 2014--- followed by dr Feliz Kern    Chronic kidney disease, stage II (mild) 05/29/2014    pt denies--- followed by dr. Juni Pelaez [facility])     type 2-- sqbs am avg 100-115--- no hypo    History of CVA (cerebrovascular accident) 03/03/2017    \" light\"-- 2017-- no residual    HLD (hyperlipidemia) 8/7/2015    NSTEMI (non-ST elevated myocardial infarction) ([facility] 04/24/2021    NSTEMI with RCA stents x 4 & LAD stent x 1    PVC (premature ventricular contraction) 3/3/2017    Right ankle pain     r/t fall from ladder    Tobacco use disorder 5/29/2014     Past Surgical History:   Procedure Laterality Date    COLONOSCOPY  10/16/15    Dr. Abhinav Orellana; colon polyp; repeat 5 yrs    CORONARY ANGIOPLASTY WITH STENT PLACEMENT  04/24/21 & 04/26/21    Occluded mRCA:3 2.5 X 15 [place] AZUCENA& 1 2.5 X 8 [place] AZUCENA. 90% mLAD:2.5 x 15 OnyxDES    LEFT HEART CATH,PERCUTANEOUS  2014    [address] to [address] Right 10/20/2020    Dr. Dariela Caballero; ankle ORIF    ORTHOPEDIC SURGERY  20 yrs ago    bilateral shoulder scope    UPPER GASTROINTESTINAL ENDOSCOPY  10/16/15    Dr. Arianna Roberts; Grade A esophagitis     Family History   Problem Relation Age of Onset    Heart Disease Brother 64        5 bypass at one time    Cancer Mother 72        breast    Psychiatric Disorder Mother         Alzheimers    Heart Disease Father     Heart Disease Sister     COPD Brother     Heart Disease Sister     No Known Problems Sister       Social History     Tobacco Use    Smoking status: Some Days     Types: Cigarettes     Last attempt to quit: 10/20/2020     Years since quittin.0    Smokeless tobacco: Never    Tobacco comments:     Quit smoking: did smoke 1ppd x 40 yrs-- now only occ   Substance Use Topics    Alcohol use: Yes     Alcohol/week: 0.0 standard drinks       ROS:    Review of Systems   Constitutional: Negative for chills, decreased appetite, diaphoresis, fever, malaise/fatigue and weight gain. HENT:  Negative for congestion, hearing loss, hoarse voice and nosebleeds. Eyes:  Negative for blurred vision. Cardiovascular:  Negative for chest pain, claudication, cyanosis, dyspnea on exertion, irregular heartbeat, leg swelling, near-syncope, orthopnea, palpitations, paroxysmal nocturnal dyspnea and syncope. Respiratory:  Negative for chest tightness, shortness of breath, sputum production and wheezing. Endocrine: Negative for polydipsia, polyphagia and polyuria. Skin:  Negative for color change. Musculoskeletal:  Negative for muscle weakness. Gastrointestinal:  Negative for abdominal pain, bowel incontinence, diarrhea, hematemesis and hematochezia. Genitourinary:  Negative for dysuria, frequency and hematuria.    Neurological:  Negative for dizziness, focal weakness, light-headedness, loss of balance, numbness, sensory change and weakness. Psychiatric/Behavioral:  Negative for altered mental status and memory loss. PHYSICAL EXAM:   /81   Pulse 62   Ht 6' 1\" (1.854 m)   Wt 208 lb (94.3 kg)   BMI 27.44 kg/m²      Physical Exam  Constitutional:       Appearance: Normal appearance. HENT:      Head: Normocephalic and atraumatic. Nose: Nose normal.   Eyes:      Extraocular Movements: Extraocular movements intact. Pupils: Pupils are equal, round, and reactive to light. Neck:      Vascular: No carotid bruit. Cardiovascular:      Rate and Rhythm: Regular rhythm. Pulses: Normal pulses. Heart sounds: No murmur heard. Pulmonary:      Effort: Pulmonary effort is normal.      Breath sounds: Normal breath sounds. Abdominal:      General: Abdomen is flat. Bowel sounds are normal.      Palpations: Abdomen is soft. Musculoskeletal:         General: Normal range of motion. Cervical back: Normal range of motion and neck supple. Skin:     General: Skin is warm and dry. Neurological:      General: No focal deficit present. Mental Status: He is alert and oriented to person, place, and time. Psychiatric:         Mood and Affect: Mood normal.       Medical problems and test results were reviewed with the patient today. No results found for this or any previous visit (from the past 672 hour(s)). Lab Results   Component Value Date/Time    CHOL 211 01/10/2022 01:37 PM    HDL 36 01/10/2022 01:37 PM    VLDL 29 01/10/2022 01:37 PM     Results for orders placed or performed in visit on 10/21/22   EKG 12 lead    Impression    Sinus  Rhythm  -First degree A-V block   Shaye = 220  -RSR(V1) -nondiagnostic.    -Old inferior infarct. ABNORMAL        ASSESSMENT and PLAN    [place] was seen today for coronary artery disease. Diagnoses and all orders for this visit:    Atherosclerosis of native coronary artery of native heart without angina pectoris:Stable. Reduce Brilinta dose since >12 months since PCI. Continue ASA,Toprol,Diovan,and Pravastatin. -     EKG 12 lead  -     ticagrelor (BRILINTA) 60 MG TABS tablet; Take 1 tablet by mouth 2 times daily    NSTEMI (non-ST elevated myocardial infarction) (HCC):Continue DAPT. Reduce Brilinta dose. -     ticagrelor (BRILINTA) 60 MG TABS tablet; Take 1 tablet by mouth 2 times daily    Bilateral carotid artery stenosis:Check follow up ultrasound before next visit. -     Vascular duplex carotid bilateral; Future    Uncontrolled hypertension:Stable and improved. Continue Diovan and Toprol. Statin intolerance:Seems to be tolerating Pravastatin. Lipids monitored by PCP. Consider increasing dose or adding Repatha if next lipid panel shows TC>200 an or LDL>70. Disposition:    Return in about 6 months (around 4/21/2023) for Follow up after procedure.                 Beverley Litten, MD  10/21/2022  8:34 AM

## 2022-11-01 ENCOUNTER — OFFICE VISIT (OUTPATIENT)
Dept: FAMILY MEDICINE CLINIC | Facility: CLINIC | Age: 72
End: 2022-11-01
Payer: MEDICARE

## 2022-11-01 ENCOUNTER — NURSE ONLY (OUTPATIENT)
Dept: FAMILY MEDICINE CLINIC | Facility: CLINIC | Age: 72
End: 2022-11-01

## 2022-11-01 VITALS
DIASTOLIC BLOOD PRESSURE: 66 MMHG | HEART RATE: 82 BPM | BODY MASS INDEX: 27.45 KG/M2 | RESPIRATION RATE: 18 BRPM | HEIGHT: 73 IN | WEIGHT: 207.1 LBS | OXYGEN SATURATION: 92 % | SYSTOLIC BLOOD PRESSURE: 132 MMHG | TEMPERATURE: 98.1 F

## 2022-11-01 DIAGNOSIS — I25.10 ATHEROSCLEROSIS OF NATIVE CORONARY ARTERY OF NATIVE HEART WITHOUT ANGINA PECTORIS: ICD-10-CM

## 2022-11-01 DIAGNOSIS — E11.65 UNCONTROLLED TYPE 2 DIABETES MELLITUS WITH HYPERGLYCEMIA (HCC): ICD-10-CM

## 2022-11-01 DIAGNOSIS — R97.20 ELEVATED PSA: ICD-10-CM

## 2022-11-01 DIAGNOSIS — I10 ESSENTIAL HYPERTENSION: ICD-10-CM

## 2022-11-01 DIAGNOSIS — Z23 NEED FOR INFLUENZA VACCINATION: ICD-10-CM

## 2022-11-01 DIAGNOSIS — I65.23 BILATERAL CAROTID ARTERY STENOSIS: ICD-10-CM

## 2022-11-01 DIAGNOSIS — E11.65 UNCONTROLLED TYPE 2 DIABETES MELLITUS WITH HYPERGLYCEMIA (HCC): Primary | ICD-10-CM

## 2022-11-01 LAB — PSA SERPL-MCNC: 1.3 NG/ML

## 2022-11-01 PROCEDURE — 4004F PT TOBACCO SCREEN RCVD TLK: CPT | Performed by: FAMILY MEDICINE

## 2022-11-01 PROCEDURE — 3017F COLORECTAL CA SCREEN DOC REV: CPT | Performed by: FAMILY MEDICINE

## 2022-11-01 PROCEDURE — 2022F DILAT RTA XM EVC RTNOPTHY: CPT | Performed by: FAMILY MEDICINE

## 2022-11-01 PROCEDURE — G8427 DOCREV CUR MEDS BY ELIG CLIN: HCPCS | Performed by: FAMILY MEDICINE

## 2022-11-01 PROCEDURE — G8484 FLU IMMUNIZE NO ADMIN: HCPCS | Performed by: FAMILY MEDICINE

## 2022-11-01 PROCEDURE — 99214 OFFICE O/P EST MOD 30 MIN: CPT | Performed by: FAMILY MEDICINE

## 2022-11-01 PROCEDURE — 3078F DIAST BP <80 MM HG: CPT | Performed by: FAMILY MEDICINE

## 2022-11-01 PROCEDURE — G0008 ADMIN INFLUENZA VIRUS VAC: HCPCS | Performed by: FAMILY MEDICINE

## 2022-11-01 PROCEDURE — G8417 CALC BMI ABV UP PARAM F/U: HCPCS | Performed by: FAMILY MEDICINE

## 2022-11-01 PROCEDURE — 3074F SYST BP LT 130 MM HG: CPT | Performed by: FAMILY MEDICINE

## 2022-11-01 PROCEDURE — 90694 VACC AIIV4 NO PRSRV 0.5ML IM: CPT | Performed by: FAMILY MEDICINE

## 2022-11-01 PROCEDURE — 1123F ACP DISCUSS/DSCN MKR DOCD: CPT | Performed by: FAMILY MEDICINE

## 2022-11-01 PROCEDURE — 3052F HG A1C>EQUAL 8.0%<EQUAL 9.0%: CPT | Performed by: FAMILY MEDICINE

## 2022-11-01 RX ORDER — INSULIN DEGLUDEC INJECTION 100 U/ML
INJECTION, SOLUTION SUBCUTANEOUS
Qty: 12 ADJUSTABLE DOSE PRE-FILLED PEN SYRINGE | Refills: 1 | Status: SHIPPED | OUTPATIENT
Start: 2022-11-01

## 2022-11-01 RX ORDER — ORAL SEMAGLUTIDE 7 MG/1
1 TABLET ORAL DAILY
Qty: 90 TABLET | Refills: 1 | Status: SHIPPED | OUTPATIENT
Start: 2022-11-01 | End: 2022-11-03 | Stop reason: DRUGHIGH

## 2022-11-01 RX ORDER — METOPROLOL SUCCINATE 25 MG/1
25 TABLET, EXTENDED RELEASE ORAL DAILY
Qty: 90 TABLET | Refills: 1 | Status: SHIPPED | OUTPATIENT
Start: 2022-11-01

## 2022-11-01 RX ORDER — VALSARTAN 160 MG/1
160 TABLET ORAL DAILY
Qty: 90 TABLET | Refills: 1 | Status: SHIPPED | OUTPATIENT
Start: 2022-11-01

## 2022-11-01 NOTE — PROGRESS NOTES
[address] [facility] Tiff Mattson  Phone: [phone] Fax [phone]  Jenelle Mark. Herlinda Mcneal M.D.  11/1/2022        Shawn Matson is a 67 y.o. male     HPI:  Patient is seen for Follow-up (2 months)  Patient has tolerated starting back the valsartan and the addition of Toprol-XL. Additionally he saw Dr. Mulu Barrow, cardiologist, with reduction of his Brilinta down to 60 mg twice daily. Prior to his follow-up next April with cardiology, patient will have carotid studies done with prior history of stenosis. Patient's PSA was elevated at 4.0 having been increased prior from 1.3. He reports one episode of nocturia each night. Has not seen a urologist in the past.  Prior PSA elevation did decline subsequently. He denies any dysuria hematuria or significant urinary hesitancy/dribbling. No family history of prostate cancer. A1c level was once again elevated at 8.5% last visit. He continues to take Virgene Raveling 38 units daily. The cost of his Virgene Raveling increased significantly with patient having been changed from 100 units/mL pens to 200 units/mL pens. He would like to change back. He has tolerated the addition of Rybelsus and is taking 7 mg orally daily at present. ROS:  Denies any chest pain dyspnea diaphoresis or edema. No reported palpitations or tachycardia. No large weight fluctuations. No abdominal pain. No hematochezia melena diarrhea or constipation. No dysuria or hematuria. Denies any depression.      Allergies   Allergen Reactions    Atorvastatin Other (See Comments)    Rosuvastatin Other (See Comments)    Simvastatin Other (See Comments)       Active Ambulatory Problems     Diagnosis Date Noted    Gastroesophageal reflux disease without esophagitis 08/27/2019    Chronic pain of right ankle 01/10/2022    Chronic kidney disease, stage II (mild) 05/29/2014    NSTEMI (non-ST elevated myocardial infarction) ([facility] [address] 04/24/2021    Syncope 02/11/2017 Controlled type 2 diabetes mellitus with stage 2 chronic kidney disease, with long-term current use of insulin ([facility] [address] 01/11/2018    Atherosclerosis of native coronary artery of native heart without angina pectoris 08/07/2015    Dyslipidemia 08/07/2015    PVC (premature ventricular contraction) 03/03/2017    ACS (acute coronary syndrome) ([address] 04/24/2021    Uncontrolled hypertension 08/07/2015    Chronic diastolic congestive heart failure ([address] 02/23/2017    History of CVA (cerebrovascular accident) 03/03/2017    Erectile dysfunction 09/21/2020    Primary osteoarthritis of left knee 03/02/2020    Statin intolerance 03/02/2020    Bilateral carotid artery disease ([address] 03/03/2017    Diabetic autonomic neuropathy associated with type 2 diabetes mellitus ([address] 08/30/2022    Uncontrolled type 2 diabetes mellitus with hyperglycemia ([address] 08/30/2022    Elevated PSA 11/01/2022     Resolved Ambulatory Problems     Diagnosis Date Noted    No Resolved Ambulatory Problems     Past Medical History:   Diagnosis Date    Acute myocardial infarction, subendocardial infarction, initial episode of care ([address] 05/29/2014    Adverse effect of anesthesia     CAD (coronary artery disease)     Diabetes ([address]     HLD (hyperlipidemia) 8/7/2015    Right ankle pain     Tobacco use disorder 5/29/2014        Past Surgical History:   Procedure Laterality Date    COLONOSCOPY  10/16/15    Dr. Santos Lawrence; colon polyp; repeat 5 yrs    CORONARY ANGIOPLASTY WITH STENT PLACEMENT  04/24/21 & 04/26/21    Occluded mRCA:3 2.5 X 15 Antione AZUCENA& 1 2.5 X 8 Antione AZUCENA.  90% mLAD:2.5 x 15 OnyxDES    LEFT HEART CATH,PERCUTANEOUS  5/29/2014    xience to [address] Right 10/20/2020    Dr. Trino Dobbs; ankle ORIF    ORTHOPEDIC SURGERY  20 yrs ago    bilateral shoulder scope    UPPER GASTROINTESTINAL ENDOSCOPY  10/16/15    Dr. Santos Lawrence; Grade A esophagitis        Social History     Tobacco Use    Smoking status: Former     Types: Cigarettes     Quit date: 10/20/2020     Years since quittin.0    Smokeless tobacco: Never    Tobacco comments:     Quit smoking: did smoke 1ppd x 40 yrs-- now only occ   Substance Use Topics    Alcohol use: Yes     Alcohol/week: 0.0 standard drinks    Drug use: No     Immunization History   Administered Date(s) Administered    Influenza Trivalent 2016    Influenza, FLUAD, (age 72 y+), Adjuvanted, 0.5mL 2020, 01/10/2022, 2022    Influenza, High Dose (Fluzone 65 yrs and older) 2016, 10/11/2017, 2018    Influenza, Triv, inactivated, subunit, adjuvanted, IM (Fluad 65 yrs and older) 2020    Pneumococcal Conjugate 13-valent ([id number]) 2016    Pneumococcal Polysaccharide ([id number]) 08/10/2018    Td (Adult), 2 Lf Tetanus Toxoid, Pf (Td, Absorbed) 2020    Tdap (Boostrix, Adacel) 2014       Physical Exam:  Blood pressure 132/66, pulse 82, temperature 98.1 °F (36.7 °C), temperature source Temporal, resp. rate 18, height 6' 1\" (1.854 m), weight 207 lb 1.6 oz (93.9 kg), SpO2 92 %. Pleasant male in no apparent distress. Affect is appropriate. HEENT grossly normal.  Oral mucosa is moist and intact. No cervical lymphadenopathy or thyromegaly or nodularity. Lungs slightly distant but with fair air exchange and no rhonchi, wheeze, crackles. No JVD or hepatojugular reflux. Cardiovascular regular S1-S2 without murmurs rubs or gallops. Radial pulses 2+. Abdomen is soft and nontender with positive bowel sounds. No masses palpated. No peripheral edema or cyanosis. Moves all extremities well. Assessment and Plan:   Diagnosis Orders   1. Uncontrolled type 2 diabetes mellitus with hyperglycemia (HCC)  Hemoglobin A1C    Semaglutide (RYBELSUS) 7 MG TABS    Insulin Degludec (TRESIBA FLEXTOUCH) 100 UNIT/ML SOPN      2. Essential hypertension  valsartan (DIOVAN) 160 MG tablet    metoprolol succinate (TOPROL XL) 25 MG extended release tablet      3. Elevated PSA  PSA, Diagnostic      4. Bilateral carotid artery stenosis        5. Atherosclerosis of native coronary artery of native heart without angina pectoris  metoprolol succinate (TOPROL XL) 25 MG extended release tablet      6. Need for influenza vaccination  Influenza, FLUAD, (age 72 y+), IM, Preservative Free, 0.5 mL        Videos on A1c testing and making a plan, how exercise can help with diabetes, how others stay motivated with diabetes, and information on hypertension, PSA, and influenza vaccine provided. Await repeat lab work from today. High-dose flu shot given today.   Refilled above medications with change patient back to the 100 units/mL of his [place] secondary to cost.  Plan reassessment here in mid January fasting and for subsequent wellness exam.    Discharge Meds:  Current Outpatient Medications   Medication Sig Dispense Refill    Semaglutide (RYBELSUS) 7 MG TABS Take 1 tablet by mouth daily 90 tablet 1    Insulin Degludec (TRESIBA FLEXTOUCH) 100 UNIT/ML SOPN 40 units injected subcutaneously daily 12 Adjustable Dose Pre-filled Pen Syringe 1    valsartan (DIOVAN) 160 MG tablet Take 1 tablet by mouth daily 90 tablet 1    metoprolol succinate (TOPROL XL) 25 MG extended release tablet Take 1 tablet by mouth daily 90 tablet 1    ticagrelor (BRILINTA) 60 MG TABS tablet Take 1 tablet by mouth 2 times daily 180 tablet 3    gabapentin (NEURONTIN) 300 MG capsule Take 1 capsule by mouth once daily 90 capsule 1    aspirin 81 MG chewable tablet Take 81 mg by mouth every 12 hours      cyclobenzaprine (FLEXERIL) 5 MG tablet Take 5 mg by mouth 3 times daily as needed      metFORMIN (GLUCOPHAGE) 500 MG tablet Take 500 mg by mouth 2 times daily (with meals)      nitroGLYCERIN (NITROSTAT) 0.4 MG SL tablet Place 0.4 mg under the tongue      omeprazole (PRILOSEC) 20 MG delayed release capsule TAKE 1 CAPSULE BY MOUTH ONCE DAILY 30 MINUTES BEFORE A MEAL      pravastatin (PRAVACHOL) 20 MG tablet Take 20 mg by mouth       No current facility-administered medications for this visit. Return in about 11 weeks (around 1/17/2023) for Fasting and subsequent wellness. Any new medications were explained today including any potential drug interactions or significant side effects. The patient's questions in regards to this were answered today. Dictated using voice recognition software.   Proofread, but unrecognized errors may exist.

## 2022-11-02 LAB
EST. AVERAGE GLUCOSE BLD GHB EST-MCNC: 197 MG/DL
HBA1C MFR BLD: 8.5 % (ref 4.8–5.6)

## 2022-11-03 DIAGNOSIS — E11.65 UNCONTROLLED TYPE 2 DIABETES MELLITUS WITH HYPERGLYCEMIA (HCC): ICD-10-CM

## 2022-11-03 RX ORDER — ORAL SEMAGLUTIDE 14 MG/1
1 TABLET ORAL
Qty: 90 TABLET | Refills: 1 | Status: SHIPPED | OUTPATIENT
Start: 2022-11-03

## 2023-01-13 RX ORDER — PRAVASTATIN SODIUM 20 MG
TABLET ORAL
Qty: 90 TABLET | Refills: 0 | Status: SHIPPED | OUTPATIENT
Start: 2023-01-13 | End: 2023-01-18 | Stop reason: SDUPTHER

## 2023-01-13 RX ORDER — OMEPRAZOLE 20 MG/1
CAPSULE, DELAYED RELEASE ORAL
Qty: 90 CAPSULE | Refills: 0 | Status: SHIPPED | OUTPATIENT
Start: 2023-01-13 | End: 2023-01-18 | Stop reason: SDUPTHER

## 2023-01-18 ENCOUNTER — OFFICE VISIT (OUTPATIENT)
Dept: FAMILY MEDICINE CLINIC | Facility: CLINIC | Age: 73
End: 2023-01-18
Payer: MEDICARE

## 2023-01-18 ENCOUNTER — NURSE ONLY (OUTPATIENT)
Dept: FAMILY MEDICINE CLINIC | Facility: CLINIC | Age: 73
End: 2023-01-18

## 2023-01-18 VITALS
TEMPERATURE: 98.1 F | RESPIRATION RATE: 18 BRPM | SYSTOLIC BLOOD PRESSURE: 143 MMHG | DIASTOLIC BLOOD PRESSURE: 71 MMHG | HEART RATE: 82 BPM | WEIGHT: 212.1 LBS | OXYGEN SATURATION: 92 % | BODY MASS INDEX: 28.11 KG/M2 | HEIGHT: 73 IN

## 2023-01-18 DIAGNOSIS — E11.43 DIABETIC AUTONOMIC NEUROPATHY ASSOCIATED WITH TYPE 2 DIABETES MELLITUS (HCC): ICD-10-CM

## 2023-01-18 DIAGNOSIS — R97.20 ELEVATED PSA: ICD-10-CM

## 2023-01-18 DIAGNOSIS — K21.9 GASTROESOPHAGEAL REFLUX DISEASE WITHOUT ESOPHAGITIS: ICD-10-CM

## 2023-01-18 DIAGNOSIS — E78.5 DYSLIPIDEMIA: ICD-10-CM

## 2023-01-18 DIAGNOSIS — E11.65 UNCONTROLLED TYPE 2 DIABETES MELLITUS WITH HYPERGLYCEMIA (HCC): ICD-10-CM

## 2023-01-18 DIAGNOSIS — Z00.00 MEDICARE ANNUAL WELLNESS VISIT, SUBSEQUENT: Primary | ICD-10-CM

## 2023-01-18 DIAGNOSIS — Z87.891 PERSONAL HISTORY OF TOBACCO USE, PRESENTING HAZARDS TO HEALTH: ICD-10-CM

## 2023-01-18 DIAGNOSIS — I10 UNCONTROLLED HYPERTENSION: ICD-10-CM

## 2023-01-18 DIAGNOSIS — Z23 NEED FOR PROPHYLACTIC VACCINATION AND INOCULATION AGAINST VARICELLA: ICD-10-CM

## 2023-01-18 DIAGNOSIS — Z86.010 HISTORY OF COLON POLYPS: ICD-10-CM

## 2023-01-18 DIAGNOSIS — I50.32 CHRONIC DIASTOLIC (CONGESTIVE) HEART FAILURE (HCC): ICD-10-CM

## 2023-01-18 DIAGNOSIS — I65.23 BILATERAL CAROTID ARTERY STENOSIS: ICD-10-CM

## 2023-01-18 PROBLEM — I24.9 ACS (ACUTE CORONARY SYNDROME) (HCC): Status: RESOLVED | Noted: 2021-04-24 | Resolved: 2023-01-18

## 2023-01-18 PROBLEM — Z86.0100 HISTORY OF COLON POLYPS: Status: ACTIVE | Noted: 2023-01-18

## 2023-01-18 PROBLEM — I21.4 NSTEMI (NON-ST ELEVATED MYOCARDIAL INFARCTION) (HCC): Status: RESOLVED | Noted: 2021-04-24 | Resolved: 2023-01-18

## 2023-01-18 LAB
BASOPHILS # BLD: 0.1 K/UL (ref 0–0.2)
BASOPHILS NFR BLD: 0 % (ref 0–2)
BILIRUBIN, URINE, POC: NEGATIVE
BLOOD URINE, POC: NEGATIVE
CREAT UR-MCNC: 57 MG/DL
DIFFERENTIAL METHOD BLD: ABNORMAL
EOSINOPHIL # BLD: 0.1 K/UL (ref 0–0.8)
EOSINOPHIL NFR BLD: 1 % (ref 0.5–7.8)
ERYTHROCYTE [DISTWIDTH] IN BLOOD BY AUTOMATED COUNT: 12 % (ref 11.9–14.6)
EST. AVERAGE GLUCOSE BLD GHB EST-MCNC: 206 MG/DL
GLUCOSE URINE, POC: NORMAL
HBA1C MFR BLD: 8.8 % (ref 4.8–5.6)
HCT VFR BLD AUTO: 46.7 % (ref 41.1–50.3)
HGB BLD-MCNC: 15.7 G/DL (ref 13.6–17.2)
IMM GRANULOCYTES # BLD AUTO: 0 K/UL (ref 0–0.5)
IMM GRANULOCYTES NFR BLD AUTO: 0 % (ref 0–5)
KETONES, URINE, POC: NEGATIVE
LEUKOCYTE ESTERASE, URINE, POC: NEGATIVE
LYMPHOCYTES # BLD: 1.9 K/UL (ref 0.5–4.6)
LYMPHOCYTES NFR BLD: 16 % (ref 13–44)
MCH RBC QN AUTO: 33.6 PG (ref 26.1–32.9)
MCHC RBC AUTO-ENTMCNC: 33.6 G/DL (ref 31.4–35)
MCV RBC AUTO: 100 FL (ref 82–102)
MICROALBUMIN UR-MCNC: 1.63 MG/DL (ref 0–3)
MICROALBUMIN/CREAT UR-RTO: 29 MG/G (ref 0–30)
MONOCYTES # BLD: 0.6 K/UL (ref 0.1–1.3)
MONOCYTES NFR BLD: 5 % (ref 4–12)
NEUTS SEG # BLD: 9.1 K/UL (ref 1.7–8.2)
NEUTS SEG NFR BLD: 78 % (ref 43–78)
NITRITE, URINE, POC: NEGATIVE
NRBC # BLD: 0 K/UL (ref 0–0.2)
PH, URINE, POC: 5 (ref 4.6–8)
PLATELET # BLD AUTO: 311 K/UL (ref 150–450)
PMV BLD AUTO: 9.2 FL (ref 9.4–12.3)
PROTEIN,URINE, POC: NORMAL
PSA SERPL-MCNC: 1.2 NG/ML
RBC # BLD AUTO: 4.67 M/UL (ref 4.23–5.6)
SPECIFIC GRAVITY, URINE, POC: 1 (ref 1–1.03)
URINALYSIS CLARITY, POC: CLEAR
URINALYSIS COLOR, POC: YELLOW
UROBILINOGEN, POC: 0.2
WBC # BLD AUTO: 11.8 K/UL (ref 4.3–11.1)

## 2023-01-18 PROCEDURE — G8427 DOCREV CUR MEDS BY ELIG CLIN: HCPCS | Performed by: FAMILY MEDICINE

## 2023-01-18 PROCEDURE — G0296 VISIT TO DETERM LDCT ELIG: HCPCS | Performed by: FAMILY MEDICINE

## 2023-01-18 PROCEDURE — 3078F DIAST BP <80 MM HG: CPT | Performed by: FAMILY MEDICINE

## 2023-01-18 PROCEDURE — G8484 FLU IMMUNIZE NO ADMIN: HCPCS | Performed by: FAMILY MEDICINE

## 2023-01-18 PROCEDURE — 99214 OFFICE O/P EST MOD 30 MIN: CPT | Performed by: FAMILY MEDICINE

## 2023-01-18 PROCEDURE — G8417 CALC BMI ABV UP PARAM F/U: HCPCS | Performed by: FAMILY MEDICINE

## 2023-01-18 PROCEDURE — 3077F SYST BP >= 140 MM HG: CPT | Performed by: FAMILY MEDICINE

## 2023-01-18 PROCEDURE — G0439 PPPS, SUBSEQ VISIT: HCPCS | Performed by: FAMILY MEDICINE

## 2023-01-18 PROCEDURE — 3017F COLORECTAL CA SCREEN DOC REV: CPT | Performed by: FAMILY MEDICINE

## 2023-01-18 PROCEDURE — 3046F HEMOGLOBIN A1C LEVEL >9.0%: CPT | Performed by: FAMILY MEDICINE

## 2023-01-18 PROCEDURE — 2022F DILAT RTA XM EVC RTNOPTHY: CPT | Performed by: FAMILY MEDICINE

## 2023-01-18 PROCEDURE — 81003 URINALYSIS AUTO W/O SCOPE: CPT | Performed by: FAMILY MEDICINE

## 2023-01-18 PROCEDURE — 1036F TOBACCO NON-USER: CPT | Performed by: FAMILY MEDICINE

## 2023-01-18 PROCEDURE — 1123F ACP DISCUSS/DSCN MKR DOCD: CPT | Performed by: FAMILY MEDICINE

## 2023-01-18 RX ORDER — PRAVASTATIN SODIUM 20 MG
TABLET ORAL
Qty: 90 TABLET | Refills: 3 | Status: SHIPPED | OUTPATIENT
Start: 2023-01-18

## 2023-01-18 RX ORDER — OMEPRAZOLE 20 MG/1
CAPSULE, DELAYED RELEASE ORAL
Qty: 90 CAPSULE | Refills: 3 | Status: SHIPPED | OUTPATIENT
Start: 2023-01-18

## 2023-01-18 RX ORDER — GABAPENTIN 300 MG/1
CAPSULE ORAL
Qty: 90 CAPSULE | Refills: 3 | Status: SHIPPED | OUTPATIENT
Start: 2023-01-18 | End: 2023-07-13

## 2023-01-18 RX ORDER — INSULIN DEGLUDEC INJECTION 100 U/ML
INJECTION, SOLUTION SUBCUTANEOUS
Qty: 12 ADJUSTABLE DOSE PRE-FILLED PEN SYRINGE | Refills: 1 | Status: SHIPPED | OUTPATIENT
Start: 2023-01-18

## 2023-01-18 ASSESSMENT — PATIENT HEALTH QUESTIONNAIRE - PHQ9
SUM OF ALL RESPONSES TO PHQ QUESTIONS 1-9: 0
2. FEELING DOWN, DEPRESSED OR HOPELESS: 0
1. LITTLE INTEREST OR PLEASURE IN DOING THINGS: 0
SUM OF ALL RESPONSES TO PHQ9 QUESTIONS 1 & 2: 0
SUM OF ALL RESPONSES TO PHQ QUESTIONS 1-9: 0

## 2023-01-18 ASSESSMENT — LIFESTYLE VARIABLES
HOW MANY STANDARD DRINKS CONTAINING ALCOHOL DO YOU HAVE ON A TYPICAL DAY: 1 OR 2
HOW OFTEN DO YOU HAVE A DRINK CONTAINING ALCOHOL: MONTHLY OR LESS

## 2023-01-18 NOTE — PATIENT INSTRUCTIONS
Learning About Dental Care for Older Adults  Dental care for older adults: Overview  Dental care for older people is much the same as for younger adults. But older adults do have concerns that younger adults do not. Older adults may have problems with gum disease and decay on the roots of their teeth. They may need missing teeth replaced or broken fillings fixed. Or they may have dentures that need to be cared for. Some older adults may have trouble holding a toothbrush. You can help remind the person you are caring for to brush and floss their teeth or to clean their dentures. In some cases, you may need to do the brushing and other dental care tasks. People who have trouble using their hands or who have dementia may need this extra help. How can you help with dental care? Normal dental care  To keep the teeth and gums healthy:  Brush the teeth with fluoride toothpaste twice a day--in the morning and at night--and floss at least once a day. Plaque can quickly build up on the teeth of older adults. Watch for the signs of gum disease. These signs include gums that bleed after brushing or after eating hard foods, such as apples. See a dentist regularly. Many experts recommend checkups every 6 months. Keep the dentist up to date on any new medications the person is taking. Encourage a balanced diet that includes whole grains, vegetables, and fruits, and that is low in saturated fat and sodium. Encourage the person you're caring for not to use tobacco products. They can affect dental and general health. Many older adults have a fixed income and feel that they can't afford dental care. But most [facility] and [facility] have programs in which dentists help older adults by lowering fees. Contact your area's public health offices or  for information about dental care in your area.   Using a toothbrush  Older adults with arthritis sometimes have trouble brushing their teeth because they can't easily hold the toothbrush. Their hands and fingers may be stiff, painful, or weak. If this is the case, you can: Offer an electric toothbrush. Enlarge the handle of a non-electric toothbrush by wrapping a sponge, an elastic bandage, or adhesive tape around it. Push the toothbrush handle through a ball made of rubber or soft foam.  Make the handle longer and thicker by taping Popsicle sticks or tongue depressors to it. You may also be able to buy special toothbrushes, toothpaste dispensers, and floss holders. Your doctor may recommend a soft-bristle toothbrush if the person you care for bleeds easily. Bleeding can happen because of a health problem or from certain medicines. A toothpaste for sensitive teeth may help if the person you care for has sensitive teeth. How do you brush and floss someone's teeth? If the person you are caring for has a hard time cleaning their teeth on their own, you may need to brush and floss their teeth for them. It may be easiest to have the person sit and face away from you, and to sit or stand behind them. That way you can steady their head against your arm as you reach around to floss and brush their teeth. Choose a place that has good lighting and is comfortable for both of you. Before you begin, gather your supplies. You will need gloves, floss, a toothbrush, and a container to hold water if you are not near a sink. Wash and dry your hands well and put on gloves. Start by flossing:  Gently work a piece of floss between each of the teeth toward the gums. A plastic flossing tool may make this easier, and they are available at most [facility]. Curve the floss around each tooth into a U-shape and gently slide it under the gum line. Move the floss firmly up and down several times to scrape off the plaque. After you've finished flossing, throw away the used floss and begin brushing:  Wet the brush and apply toothpaste. Place the brush at a 45-degree angle where the teeth meet the gums. Press firmly, and move the brush in small circles over the surface of the teeth. Be careful not to brush too hard. Vigorous brushing can make the gums pull away from the teeth and can scratch the tooth enamel. Brush all surfaces of the teeth, on the tongue side and on the cheek side. Pay special attention to the front teeth and all surfaces of the back teeth. Brush chewing surfaces with short back-and-forth strokes. After you've finished, help the person rinse the remaining toothpaste from their mouth. Where can you learn more? Go to http://www.woods.com/ and enter F944 to learn more about \"Learning About Dental Care for Older Adults. \"  Current as of: June 16, 2022               Content Version: 13.5  © 2006-2022 Healthwise, [employer]. Care instructions adapted under license by [facility] ([facility]). If you have questions about a medical condition or this instruction, always ask your healthcare professional. [place] [address] any warranty or liability for your use of this information. Learning About Vision Tests  What are vision tests? The four most common vision tests are visual acuity tests, refraction, visual field tests, and color vision tests. Visual acuity (sharpness) tests  These tests are used: To see if you need glasses or contact lenses. To monitor an eye problem. To check an eye injury. Visual acuity tests are done as part of routine exams. You may also have this test when you get your 's license or apply for some types of jobs. Visual field tests  These tests are used: To check for vision loss in any area of your range of vision. To screen for certain eye diseases. To look for nerve damage after a stroke, head injury, or other problem that could reduce blood flow to the brain. Refraction and color tests  A refraction test is done to find the right prescription for glasses and contact lenses.   A color vision test is done to check for color blindness. Color vision is often tested as part of a routine exam. You may also have this test when you apply for a job where recognizing different colors is important, such as , electronics, or the [employer]. How are vision tests done? Visual acuity test   You cover one eye at a time. You read aloud from a wall chart across the room. You read aloud from a small card that you hold in your hand. Refraction   You look into a special device. The device puts lenses of different strengths in front of each eye to see how strong your glasses or contact lenses need to be. Visual field tests   Your doctor may have you look through special machines. Or your doctor may simply have you stare straight ahead while they move a finger into and out of your field of vision. Color vision test   You look at pieces of printed test patterns in various colors. You say what number or symbol you see. Your doctor may have you trace the number or symbol using a pointer. How do these tests feel? There is very little chance of having a problem from this test. If dilating drops are used for a vision test, they may make the eyes sting and cause a medicine taste in the mouth. Follow-up care is a key part of your treatment and safety. Be sure to make and go to all appointments, and call your doctor if you are having problems. It's also a good idea to know your test results and keep a list of the medicines you take. Where can you learn more? Go to http://www.calhoun.com/ and enter G551 to learn more about \"Learning About Vision Tests. \"  Current as of: October 12, 2022               Content Version: 13.5  © 5023-8594 Healthwise, [employer]. Care instructions adapted under license by [facility] ([facility]). If you have questions about a medical condition or this instruction, always ask your healthcare professional. [place] [address] any warranty or liability for your use of this information. Advance Directives: Care Instructions  Overview  An advance directive is a legal way to state your wishes at the end of your life. It tells your family and your doctor what to do if you can't say what you want. There are two main types of advance directives. You can change them any time your wishes change. Living will. This form tells your family and your doctor your wishes about life support and other treatment. The form is also called a declaration. Medical power of . This form lets you name a person to make treatment decisions for you when you can't speak for yourself. This person is called a health care agent (health care proxy, health care surrogate). The form is also called a durable power of  for health care. If you do not have an advance directive, decisions about your medical care may be made by a family member, or by a doctor or a  who doesn't know you. It may help to think of an advance directive as a gift to the people who care for you. If you have one, they won't have to make tough decisions by themselves. For more information, including forms for your state, see the [address] website (www.caringinfo.org/planning/advance-directives/). Follow-up care is a key part of your treatment and safety. Be sure to make and go to all appointments, and call your doctor if you are having problems. It's also a good idea to know your test results and keep a list of the medicines you take. What should you include in an advance directive? Many states have a unique advance directive form. (It may ask you to address specific issues.) Or you might use a universal form that's approved by many states. If your form doesn't tell you what to address, it may be hard to know what to include in your advance directive. Use the questions below to help you get started. Who do you want to make decisions about your medical care if you are not able to?   What life-support measures do you want if you have a serious illness that gets worse over time or can't be cured? What are you most afraid of that might happen? (Maybe you're afraid of having pain, losing your independence, or being kept alive by machines.)  Where would you prefer to die? (Your home? A hospital? A nursing home?)  Do you want to donate your organs when you die? Do you want certain Hinduism practices performed before you die? When should you call for help? Be sure to contact your doctor if you have any questions. Where can you learn more? Go to http://www.calhoun.com/ and enter R264 to learn more about \"Advance Directives: Care Instructions. \"  Current as of: June 16, 2022               Content Version: 13.5  © 3789-3043 Healthwise, Incorporated. Care instructions adapted under license by [facility] ([facility]). If you have questions about a medical condition or this instruction, always ask your healthcare professional. [place] [address] any warranty or liability for your use of this information. Personalized Preventive Plan for Amy Fay - 1/18/2023  Medicare offers a range of preventive health benefits. Some of the tests and screenings are paid in full while other may be subject to a deductible, co-insurance, and/or copay. Some of these benefits include a comprehensive review of your medical history including lifestyle, illnesses that may run in your family, and various assessments and screenings as appropriate. After reviewing your medical record and screening and assessments performed today your provider may have ordered immunizations, labs, imaging, and/or referrals for you. A list of these orders (if applicable) as well as your Preventive Care list are included within your After Visit Summary for your review.     Other Preventive Recommendations:    A preventive eye exam performed by an eye specialist is recommended every 1-2 years to screen for glaucoma; cataracts, macular degeneration, and other eye disorders. A preventive dental visit is recommended every 6 months. Try to get at least 150 minutes of exercise per week or 10,000 steps per day on a pedometer . Order or download the FREE \"Exercise & Physical Activity: Your Everyday Guide\" from The [employer]. Call 7-386.202.8159 or search The [employer] online. You need 3950-9686 mg of calcium and 5131-3510 IU of vitamin D per day. It is possible to meet your calcium requirement with diet alone, but a vitamin D supplement is usually necessary to meet this goal.  When exposed to the sun, use a sunscreen that protects against both UVA and UVB radiation with an SPF of 30 or greater. Reapply every 2 to 3 hours or after sweating, drying off with a towel, or swimming. Always wear a seat belt when traveling in a car. Always wear a helmet when riding a bicycle or motorcycle.

## 2023-01-18 NOTE — PROGRESS NOTES
Medicare Annual Wellness Visit    Amy Fay is here for [employer] (1 yr)    Assessment & Plan   Medicare annual wellness visit, subsequent  Personal history of tobacco use, presenting hazards to health  -     Graciela Adamsdinesh 36; Future  Need for prophylactic vaccination and inoculation against varicella  -     zoster recombinant adjuvanted vaccine [facility]) 50 MCG/0.5ML SUSR injection; Inject 0.5 mLs into the muscle once for 1 dose, Disp-0.5 mL, R-0Print    Discussed with patient in detail Medicare subsequent annual wellness exam.  Wellness/anticipatory guidance information provided. Information on advanced directives discussed and printed. Shingles vaccine potential benefit discussed with prescription(s) printed; should discuss with pharmacy potential cost(s). Patient was referred for low-dose chest CT. He should make an appointment with his eye doctor at [facility] eye and have information sent to our office. Recommendations for Preventive Services Due: see orders and patient instructions/AVS.  Recommended screening schedule for the next 5-10 years is provided to the patient in written form: see Patient Instructions/AVS.     Return in 6 months (on 7/18/2023). Patient's complete Health Risk Assessment and screening values have been reviewed and are found in Flowsheets. The following problems were reviewed today and where indicated follow up appointments were made and/or referrals ordered.     Positive Risk Factor Screenings with Interventions:                 Weight and Activity:  Physical Activity: Sufficiently Active    Days of Exercise per Week: 7 days    Minutes of Exercise per Session: 30 min     On average, how many days per week do you engage in moderate to strenuous exercise (like a brisk walk)?: 7 days  Have you lost any weight without trying in the past 3 months?: No  Body mass index: (!) 27.98        Dentist Screen:  Have you seen the dentist within the past year?: (!) No    Intervention:  Advised to schedule with their dentist     Vision Screen:  Do you have difficulty driving, watching TV, or doing any of your daily activities because of your eyesight?: No  Have you had an eye exam within the past year?: (!) No  No results found. Interventions:   Patient encouraged to make appointment with their eye specialist    Safety:  Do you have either shower bars, grab bars, non-slip mats or non-slip surfaces in your shower or bathtub?: (!) No  Interventions:  Advised getting these       LDCT Screening: Discussed with patient the benefits and harms of screening, follow-up diagnostic testing, over-diagnosis, false positive rate, and total radiation exposure. Counseled on the importance of adherence to annual lung cancer LDCT screening, impact of comorbidities, ability and willingness to undergo diagnosis and treatment. Counseled on the importance of maintaining cigarette smoking abstinence and cessation. The patient has a history of >20 pack years and is either still smoking or quit within the last 15 years. There are no signs or symptoms of lung cancer.       Immunization History   Administered Date(s) Administered    Influenza Trivalent 12/08/2016    Influenza, FLUAD, (age 72 y+), Adjuvanted, 0.5mL 09/21/2020, 01/10/2022, 11/01/2022    Influenza, High Dose (Fluzone 65 yrs and older) 12/08/2016, 10/11/2017, 12/07/2018    Influenza, Triv, inactivated, subunit, adjuvanted, IM (Fluad 65 yrs and older) 03/02/2020    Pneumococcal Conjugate 13-valent ([id number]) 06/01/2016    Pneumococcal Polysaccharide ([id number]) 08/10/2018    Td (Adult), 2 Lf Tetanus Toxoid, Pf (Td, Absorbed) 04/30/2020    Tdap (Boostrix, Adacel) 01/01/2014             Objective   Vitals:    01/18/23 1525 01/18/23 1609   BP: (!) 154/73 (!) 143/71   Pulse: 82    Resp: 18    Temp: 98.1 °F (36.7 °C)    TempSrc: Temporal    SpO2: 92%    Weight: 212 lb 1.6 oz (96.2 kg)    Height: 6' 1\" (1.854 m)       Body mass index is 27.98 kg/m². Allergies   Allergen Reactions    Atorvastatin Other (See Comments)    Rosuvastatin Other (See Comments)    Simvastatin Other (See Comments)     Prior to Visit Medications    Medication Sig Taking?  Authorizing Provider   gabapentin (NEURONTIN) 300 MG capsule Take 1 capsule by mouth once daily Yes Galina Regalado MD   Insulin Degludec (TRESIBA FLEXTOUCH) 100 UNIT/ML SOPN 40 units injected subcutaneously daily Yes Galina Regalado MD   omeprazole (PRILOSEC) 20 MG delayed release capsule TAKE 1 CAPSULE BY MOUTH ONCE DAILY 30  MINUTES  BEFORE  A  MEAL Yes Galina Regalado MD   pravastatin (PRAVACHOL) 20 MG tablet Take 1 tablet by mouth nightly Yes Galina Regalado MD   zoster recombinant adjuvanted vaccine [facility]) 50 MCG/0.5ML SUSR injection Inject 0.5 mLs into the muscle once for 1 dose Yes Galina Regalado MD   valsartan (DIOVAN) 160 MG tablet Take 1 tablet by mouth daily Yes Galina Regalado MD   metoprolol succinate (TOPROL XL) 25 MG extended release tablet Take 1 tablet by mouth daily Yes Galina Regalado MD   ticagrelor (BRILINTA) 60 MG TABS tablet Take 1 tablet by mouth 2 times daily Yes Yoanna Ryan MD   aspirin 81 MG chewable tablet Take 81 mg by mouth every 12 hours Yes Ar Automatic Reconciliation   cyclobenzaprine (FLEXERIL) 5 MG tablet Take 5 mg by mouth 3 times daily as needed Yes Ar Automatic Reconciliation   metFORMIN (GLUCOPHAGE) 500 MG tablet Take 500 mg by mouth 2 times daily (with meals) Yes Ar Automatic Reconciliation   nitroGLYCERIN (NITROSTAT) 0.4 MG SL tablet Place 0.4 mg under the tongue Yes Ar Automatic Reconciliation       CareTeam (Including outside providers/suppliers regularly involved in providing care):   Patient Care Team:  Galina Regalado MD as PCP - General  Galina Regalado MD as PCP - [facility] Empaneled Provider     Reviewed and updated this visit:  Tobacco  Allergies  Meds  Problems  Med Hx  Surg Hx  Soc Hx Fam Hx        [facility]  Phone: [phone] Fax [phone]  Eboni Rosen. Alberto Rico M.D.  1/18/2023        Reva Baer is a 67 y.o. male     HPI:  Patient is seen for fasting follow-up but he did have a snack cake a couple of hours before blood draw today. States that he is not taking the Rybelsus. Reports that increasing his Hetal Freed has resulted in appropriate blood sugars. He is up to 36 units of Tresiba daily with morning blood sugars between 80 and 120 fasting. Later in the day 2 hours after meals he will notice blood sugars in the 140s to 160s. No reported hypoglycemia. Patient reports remaining fairly active. He saw his cardiologist in October. He has a carotid ultrasound ordered by his cardiologist with history of carotid stenosis. Gabapentin continues to help with nerve discomfort and tingling of his feet especially his right great toe which remains mostly numb. Patient has had prior history of colon polyps. Last colonoscopy per Dr. Lashell Coleman in 2015. Was told he needed repeat study in 5 years. He has had prior history of elevated PSA. Has not had any significant nocturia in the recent past.    ROS:  Denies any chest pain dyspnea diaphoresis or edema. No reported palpitations or tachycardia. No polydipsia or polyphagia or polyuria. No large weight fluctuations. No recurrent breakthrough dyspepsia or reflux. No abdominal pain. No hematochezia melena diarrhea or constipation. No dysuria or hematuria. Denies any depression. No recent falls.     Allergies   Allergen Reactions    Atorvastatin Other (See Comments)    Rosuvastatin Other (See Comments)    Simvastatin Other (See Comments)       Active Ambulatory Problems     Diagnosis Date Noted    Gastroesophageal reflux disease without esophagitis 08/27/2019    Chronic pain of right ankle 01/10/2022    Chronic kidney disease, stage II (mild) 05/29/2014    Syncope 02/11/2017    Controlled type 2 diabetes mellitus with stage 2 chronic kidney disease, with long-term current use of insulin ([facility] [address] 01/11/2018    Atherosclerosis of native coronary artery of native heart without angina pectoris 08/07/2015    Dyslipidemia 08/07/2015    PVC (premature ventricular contraction) 03/03/2017    Uncontrolled hypertension 08/07/2015    Chronic diastolic (congestive) heart failure ([address] 02/23/2017    History of CVA (cerebrovascular accident) 03/03/2017    Erectile dysfunction 09/21/2020    Primary osteoarthritis of left knee 03/02/2020    Statin intolerance 03/02/2020    Bilateral carotid artery disease ([address] 03/03/2017    Diabetic autonomic neuropathy associated with type 2 diabetes mellitus ([address] 08/30/2022    Uncontrolled type 2 diabetes mellitus with hyperglycemia ([address] 08/30/2022    Elevated PSA 11/01/2022    History of colon polyps 01/18/2023     Resolved Ambulatory Problems     Diagnosis Date Noted    NSTEMI (non-ST elevated myocardial infarction) ([address] 04/24/2021    ACS (acute coronary syndrome) ([address] 04/24/2021     Past Medical History:   Diagnosis Date    Acute myocardial infarction, subendocardial infarction, initial episode of care ([address] 05/29/2014    Adverse effect of anesthesia     CAD (coronary artery disease)     Diabetes ([address]     HLD (hyperlipidemia) 8/7/2015    Right ankle pain     Tobacco use disorder 5/29/2014        Past Surgical History:   Procedure Laterality Date    COLONOSCOPY  10/16/15    Dr. Stephen Failing; colon polyp; repeat 5 yrs    CORONARY ANGIOPLASTY WITH STENT PLACEMENT  04/24/21 & 04/26/21    Occluded mRCA:3 2.5 X 15 [place] AZUCENA& 1 2.5 X 8 [place] AZUCENA.  90% mLAD:2.5 x 15 OnyxDES    LEFT HEART CATH,PERCUTANEOUS  5/29/2014    xience to [address] Right 10/20/2020    Dr. Boateng Ring; ankle ORIF    ORTHOPEDIC SURGERY  20 yrs ago    bilateral shoulder scope    UPPER GASTROINTESTINAL ENDOSCOPY  10/16/15    Dr. Stephen Failing; Grade A esophagitis        Social History     Tobacco Use    Smoking status: Former     Types: Cigarettes     Quit date: 10/20/2020     Years since quittin.2    Smokeless tobacco: Never    Tobacco comments:     Quit smoking: did smoke 1ppd x 40 yrs-- now only occ   Substance Use Topics    Alcohol use: Yes     Alcohol/week: 0.0 standard drinks    Drug use: No       Physical Exam:  Blood pressure (!) 143/71, pulse 82, temperature 98.1 °F (36.7 °C), temperature source Temporal, resp. rate 18, height 6' 1\" (1.854 m), weight 212 lb 1.6 oz (96.2 kg), SpO2 92 %. Pleasant male in no apparent distress. Affect is appropriate. Lower eyelids are not pale. HEENT grossly normal.  Oral mucosa is moist and intact. No cervical lymphadenopathy or thyromegaly or nodularity. Lungs with fair air exchange and no rhonchi, wheeze, crackles. No JVD or hepatojugular reflux. Cardiovascular regular S1-S2 without murmurs rubs or gallops. Radial pulses 2+. Abdomen is soft and nontender with positive bowel sounds. No masses palpated. No pitting peripheral edema or cyanosis. Moves all extremities well.        Diabetic foot exam:   Left Foot:   Visual Exam: normal   Pulse DP: 1+ (weak)   Filament test: reduced sensation   Vibratory Sensation: diminished  Right Foot:   Visual Exam: normal   Pulse DP: 1+ (weak)   Filament test: reduced sensation; right great toe numb to sensation   Vibratory Sensation: diminished     Results for orders placed or performed in visit on 23   AMB POC URINALYSIS DIP STICK AUTO W/O MICRO   Result Value Ref Range    Color, Urine, POC Yellow     Clarity, Urine, POC Clear     Glucose, Urine, POC Trace Negative    Bilirubin, Urine, POC Negative Negative    Ketones, Urine, POC Negative Negative    Specific Gravity, Urine, POC 1.005 1.001 - 1.035    Blood, Urine, POC Negative Negative    pH, Urine, POC 5.0 4.6 - 8.0    Protein, Urine, POC Trace Negative    Urobilinogen, POC 0.2     Nitrate, Urine, POC Negative Negative    Leukocyte Esterase, Urine, POC Negative Negative       Assessment and Plan:  4. Uncontrolled type 2 diabetes mellitus with hyperglycemia (HCC)  Insulin Degludec (TRESIBA FLEXTOUCH) 100 UNIT/ML SOPN    Hemoglobin A1C    Microalbumin / Creatinine Urine Ratio      5. Diabetic autonomic neuropathy associated with type 2 diabetes mellitus (HCC)  gabapentin (NEURONTIN) 300 MG capsule      6. Uncontrolled hypertension  AMB POC URINALYSIS DIP STICK AUTO W/O MICRO    CBC with Auto Differential    Comprehensive Metabolic Panel    AMB POC URINALYSIS DIP STICK AUTO W/O MICRO      7. Dyslipidemia  pravastatin (PRAVACHOL) 20 MG tablet    Comprehensive Metabolic Panel    Lipid Panel      8. Gastroesophageal reflux disease without esophagitis  omeprazole (PRILOSEC) 20 MG delayed release capsule      9. Elevated PSA  PSA, Diagnostic      10. History of colon polyps  MIRELLA - Dina Awan MD, Gastroenterology      11. Chronic diastolic (congestive) heart failure ([facility] [address]        12. Bilateral carotid artery stenosis          Videos on how to stay motivated regarding diabetes as well as videos on A1c testing and making a plan provided. Information on diabetes and exercise given. Also information on hyperlipidemia, GERD, and colon polyps provided. Await lab work as above. Encouraged 150 minutes of low impact aerobic activity weekly. Also encouraged resistance training every other day with 24-48 hours of muscle glucose uptake subsequently. Reviewed with patient his visit with cardiology, Dr. Karis Kenyon, this past October. Patient should proceed with cardiology ordered carotid studies in the near future. Monitor blood pressure make sure systolic pressure staying less than 140. If he does not, he should let us know prior to his 6-month follow-up. Refilled above medications. Refer patient back to gastroenterology for follow-up colonoscopy with prior history of colon polyps.   Spent 35 minutes with patient today in examination, evaluation, and documentation in addition to wellness exam.      Discharge Meds:  Current Outpatient Medications   Medication Sig Dispense Refill    gabapentin (NEURONTIN) 300 MG capsule Take 1 capsule by mouth once daily 90 capsule 3    Insulin Degludec (TRESIBA FLEXTOUCH) 100 UNIT/ML SOPN 40 units injected subcutaneously daily 12 Adjustable Dose Pre-filled Pen Syringe 1    omeprazole (PRILOSEC) 20 MG delayed release capsule TAKE 1 CAPSULE BY MOUTH ONCE DAILY 30  MINUTES  BEFORE  A  MEAL 90 capsule 3    pravastatin (PRAVACHOL) 20 MG tablet Take 1 tablet by mouth nightly 90 tablet 3    zoster recombinant adjuvanted vaccine (SHINGRIX) 50 MCG/0.5ML SUSR injection Inject 0.5 mLs into the muscle once for 1 dose 0.5 mL 0    valsartan (DIOVAN) 160 MG tablet Take 1 tablet by mouth daily 90 tablet 1    metoprolol succinate (TOPROL XL) 25 MG extended release tablet Take 1 tablet by mouth daily 90 tablet 1    ticagrelor (BRILINTA) 60 MG TABS tablet Take 1 tablet by mouth 2 times daily 180 tablet 3    aspirin 81 MG chewable tablet Take 81 mg by mouth every 12 hours      cyclobenzaprine (FLEXERIL) 5 MG tablet Take 5 mg by mouth 3 times daily as needed      metFORMIN (GLUCOPHAGE) 500 MG tablet Take 500 mg by mouth 2 times daily (with meals)      nitroGLYCERIN (NITROSTAT) 0.4 MG SL tablet Place 0.4 mg under the tongue       No current facility-administered medications for this visit. Return in 6 months (on 7/18/2023). Any new medications were explained today including any potential drug interactions or significant side effects. The patient's questions in regards to this were answered today. Dictated using voice recognition software.   Proofread, but unrecognized errors may exist.

## 2023-01-19 LAB
ALBUMIN SERPL-MCNC: 3.9 G/DL (ref 3.2–4.6)
ALBUMIN/GLOB SERPL: 1.2 (ref 0.4–1.6)
ALP SERPL-CCNC: 75 U/L (ref 50–136)
ALT SERPL-CCNC: 19 U/L (ref 12–65)
ANION GAP SERPL CALC-SCNC: 6 MMOL/L (ref 2–11)
AST SERPL-CCNC: 14 U/L (ref 15–37)
BILIRUB SERPL-MCNC: 0.4 MG/DL (ref 0.2–1.1)
BUN SERPL-MCNC: 18 MG/DL (ref 8–23)
CALCIUM SERPL-MCNC: 9.2 MG/DL (ref 8.3–10.4)
CHLORIDE SERPL-SCNC: 104 MMOL/L (ref 101–110)
CHOLEST SERPL-MCNC: 257 MG/DL
CO2 SERPL-SCNC: 26 MMOL/L (ref 21–32)
CREAT SERPL-MCNC: 1.3 MG/DL (ref 0.8–1.5)
GLOBULIN SER CALC-MCNC: 3.3 G/DL (ref 2.8–4.5)
GLUCOSE SERPL-MCNC: 201 MG/DL (ref 65–100)
HDLC SERPL-MCNC: 34 MG/DL (ref 40–60)
HDLC SERPL: 7.6
LDLC SERPL CALC-MCNC: 181.6 MG/DL
POTASSIUM SERPL-SCNC: 4.1 MMOL/L (ref 3.5–5.1)
PROT SERPL-MCNC: 7.2 G/DL (ref 6.3–8.2)
SODIUM SERPL-SCNC: 136 MMOL/L (ref 133–143)
TRIGL SERPL-MCNC: 207 MG/DL (ref 35–150)
VLDLC SERPL CALC-MCNC: 41.4 MG/DL (ref 6–23)

## 2023-01-23 DIAGNOSIS — I25.10 ATHEROSCLEROSIS OF NATIVE CORONARY ARTERY OF NATIVE HEART WITHOUT ANGINA PECTORIS: ICD-10-CM

## 2023-01-23 DIAGNOSIS — E11.65 UNCONTROLLED TYPE 2 DIABETES MELLITUS WITH HYPERGLYCEMIA (HCC): Primary | ICD-10-CM

## 2023-02-10 ENCOUNTER — HOSPITAL ENCOUNTER (OUTPATIENT)
Dept: CT IMAGING | Age: 73
End: 2023-02-10
Payer: MEDICARE

## 2023-02-10 VITALS — BODY MASS INDEX: 27.83 KG/M2 | HEIGHT: 73 IN | WEIGHT: 210 LBS

## 2023-02-10 DIAGNOSIS — Z87.891 PERSONAL HISTORY OF TOBACCO USE, PRESENTING HAZARDS TO HEALTH: ICD-10-CM

## 2023-02-10 PROCEDURE — 71271 CT THORAX LUNG CANCER SCR C-: CPT

## 2023-03-08 ENCOUNTER — TELEPHONE (OUTPATIENT)
Dept: FAMILY MEDICINE CLINIC | Facility: CLINIC | Age: 73
End: 2023-03-08

## 2023-03-08 NOTE — TELEPHONE ENCOUNTER
Pt called asking for someone to call him back. Did not leave a reasoning.  I did call pt and had to leave a message to call me back

## 2023-03-09 ENCOUNTER — TELEPHONE (OUTPATIENT)
Dept: FAMILY MEDICINE CLINIC | Facility: CLINIC | Age: 73
End: 2023-03-09

## 2023-03-09 DIAGNOSIS — J98.8 RESPIRATORY INFECTION: Primary | ICD-10-CM

## 2023-03-09 RX ORDER — AMOXICILLIN 875 MG/1
875 TABLET, COATED ORAL 2 TIMES DAILY
Qty: 20 TABLET | Refills: 0 | Status: SHIPPED | OUTPATIENT
Start: 2023-03-09 | End: 2023-03-19

## 2023-03-09 NOTE — TELEPHONE ENCOUNTER
[phone]  Has a sinus Inf needs a Z-GAMA or some thing called in for this today if he can PLEASE

## 2023-03-09 NOTE — TELEPHONE ENCOUNTER
Pt states that the pollen is getting to him and asking for Amoxicillin.      Nasal congestion, sinus pressure,

## 2023-05-25 DIAGNOSIS — E11.65 UNCONTROLLED TYPE 2 DIABETES MELLITUS WITH HYPERGLYCEMIA (HCC): ICD-10-CM

## 2023-05-25 RX ORDER — INSULIN DEGLUDEC INJECTION 100 U/ML
INJECTION, SOLUTION SUBCUTANEOUS
Qty: 12 ML | Refills: 0 | Status: SHIPPED | OUTPATIENT
Start: 2023-05-25

## 2023-06-08 DIAGNOSIS — I25.10 ATHEROSCLEROSIS OF NATIVE CORONARY ARTERY OF NATIVE HEART WITHOUT ANGINA PECTORIS: ICD-10-CM

## 2023-06-08 DIAGNOSIS — I10 ESSENTIAL HYPERTENSION: ICD-10-CM

## 2023-06-08 RX ORDER — METOPROLOL SUCCINATE 25 MG/1
TABLET, EXTENDED RELEASE ORAL
Qty: 90 TABLET | Refills: 0 | Status: SHIPPED | OUTPATIENT
Start: 2023-06-08

## 2023-06-08 RX ORDER — VALSARTAN 160 MG/1
TABLET ORAL
Qty: 90 TABLET | Refills: 0 | Status: SHIPPED | OUTPATIENT
Start: 2023-06-08

## 2023-07-17 DIAGNOSIS — E11.65 UNCONTROLLED TYPE 2 DIABETES MELLITUS WITH HYPERGLYCEMIA (HCC): ICD-10-CM

## 2023-07-17 RX ORDER — INSULIN DEGLUDEC INJECTION 100 U/ML
INJECTION, SOLUTION SUBCUTANEOUS
Qty: 12 ML | Refills: 0 | Status: SHIPPED | OUTPATIENT
Start: 2023-07-17

## 2023-07-17 NOTE — TELEPHONE ENCOUNTER
Last OV 1/18/23. Next OV 7/25/23. Can you send this in for pt while Cathren Brunner is out of the office this week?

## 2023-08-17 DIAGNOSIS — E11.65 UNCONTROLLED TYPE 2 DIABETES MELLITUS WITH HYPERGLYCEMIA (HCC): ICD-10-CM

## 2023-08-17 RX ORDER — INSULIN DEGLUDEC INJECTION 100 U/ML
INJECTION, SOLUTION SUBCUTANEOUS
Qty: 12 ML | Refills: 0 | OUTPATIENT
Start: 2023-08-17

## 2023-09-18 DIAGNOSIS — E11.65 UNCONTROLLED TYPE 2 DIABETES MELLITUS WITH HYPERGLYCEMIA (HCC): ICD-10-CM

## 2023-09-18 RX ORDER — INSULIN DEGLUDEC INJECTION 100 U/ML
INJECTION, SOLUTION SUBCUTANEOUS
Qty: 12 ML | Refills: 0 | Status: SHIPPED | OUTPATIENT
Start: 2023-09-18

## 2023-10-23 DIAGNOSIS — E11.65 UNCONTROLLED TYPE 2 DIABETES MELLITUS WITH HYPERGLYCEMIA (HCC): ICD-10-CM

## 2023-10-23 RX ORDER — INSULIN DEGLUDEC INJECTION 100 U/ML
INJECTION, SOLUTION SUBCUTANEOUS
Qty: 12 ML | Refills: 0 | Status: SHIPPED | OUTPATIENT
Start: 2023-10-23

## 2023-11-16 DIAGNOSIS — I21.4 NSTEMI (NON-ST ELEVATED MYOCARDIAL INFARCTION) (HCC): ICD-10-CM

## 2023-11-16 DIAGNOSIS — I25.10 ATHEROSCLEROSIS OF NATIVE CORONARY ARTERY OF NATIVE HEART WITHOUT ANGINA PECTORIS: ICD-10-CM

## 2023-11-16 RX ORDER — TICAGRELOR 60 MG/1
60 TABLET ORAL 2 TIMES DAILY
Qty: 60 TABLET | Refills: 0 | Status: SHIPPED | OUTPATIENT
Start: 2023-11-16

## 2023-11-19 DIAGNOSIS — E11.65 UNCONTROLLED TYPE 2 DIABETES MELLITUS WITH HYPERGLYCEMIA (HCC): ICD-10-CM

## 2023-11-20 RX ORDER — INSULIN DEGLUDEC INJECTION 100 U/ML
INJECTION, SOLUTION SUBCUTANEOUS
Qty: 12 ML | Refills: 2 | Status: SHIPPED | OUTPATIENT
Start: 2023-11-20

## 2024-02-08 DIAGNOSIS — E11.43 DIABETIC AUTONOMIC NEUROPATHY ASSOCIATED WITH TYPE 2 DIABETES MELLITUS (HCC): ICD-10-CM

## 2024-02-08 RX ORDER — GABAPENTIN 300 MG/1
CAPSULE ORAL
Qty: 90 CAPSULE | Refills: 0 | Status: SHIPPED | OUTPATIENT
Start: 2024-02-08 | End: 2024-05-08

## 2024-02-15 DIAGNOSIS — E11.65 UNCONTROLLED TYPE 2 DIABETES MELLITUS WITH HYPERGLYCEMIA (HCC): ICD-10-CM

## 2024-02-15 RX ORDER — INSULIN DEGLUDEC INJECTION 100 U/ML
INJECTION, SOLUTION SUBCUTANEOUS
Qty: 12 ML | Refills: 0 | Status: SHIPPED | OUTPATIENT
Start: 2024-02-15

## 2024-02-29 DIAGNOSIS — I21.4 NSTEMI (NON-ST ELEVATED MYOCARDIAL INFARCTION) (HCC): ICD-10-CM

## 2024-02-29 DIAGNOSIS — I25.10 ATHEROSCLEROSIS OF NATIVE CORONARY ARTERY OF NATIVE HEART WITHOUT ANGINA PECTORIS: ICD-10-CM

## 2024-02-29 RX ORDER — TICAGRELOR 60 MG/1
60 TABLET ORAL 2 TIMES DAILY
Qty: 60 TABLET | Refills: 0 | Status: SHIPPED | OUTPATIENT
Start: 2024-02-29

## 2024-03-17 DIAGNOSIS — E11.65 UNCONTROLLED TYPE 2 DIABETES MELLITUS WITH HYPERGLYCEMIA (HCC): ICD-10-CM

## 2024-03-17 RX ORDER — INSULIN DEGLUDEC 100 U/ML
INJECTION, SOLUTION SUBCUTANEOUS
Qty: 12 ML | Refills: 0 | OUTPATIENT
Start: 2024-03-17

## 2024-04-01 DIAGNOSIS — K21.9 GASTROESOPHAGEAL REFLUX DISEASE WITHOUT ESOPHAGITIS: ICD-10-CM

## 2024-04-01 DIAGNOSIS — E78.5 DYSLIPIDEMIA: ICD-10-CM

## 2024-04-01 RX ORDER — OMEPRAZOLE 20 MG/1
CAPSULE, DELAYED RELEASE ORAL
Qty: 30 CAPSULE | Refills: 0 | Status: SHIPPED | OUTPATIENT
Start: 2024-04-01

## 2024-04-01 RX ORDER — PRAVASTATIN SODIUM 20 MG
TABLET ORAL
Qty: 30 TABLET | Refills: 0 | Status: SHIPPED | OUTPATIENT
Start: 2024-04-01

## 2024-04-03 DIAGNOSIS — I21.4 NSTEMI (NON-ST ELEVATED MYOCARDIAL INFARCTION) (HCC): ICD-10-CM

## 2024-04-03 DIAGNOSIS — I25.10 ATHEROSCLEROSIS OF NATIVE CORONARY ARTERY OF NATIVE HEART WITHOUT ANGINA PECTORIS: ICD-10-CM

## 2024-04-03 RX ORDER — TICAGRELOR 60 MG/1
60 TABLET ORAL 2 TIMES DAILY
Qty: 60 TABLET | Refills: 0 | Status: SHIPPED | OUTPATIENT
Start: 2024-04-03

## 2024-04-18 ENCOUNTER — OFFICE VISIT (OUTPATIENT)
Dept: FAMILY MEDICINE CLINIC | Facility: CLINIC | Age: 74
End: 2024-04-18

## 2024-04-18 VITALS
DIASTOLIC BLOOD PRESSURE: 69 MMHG | OXYGEN SATURATION: 92 % | BODY MASS INDEX: 27.83 KG/M2 | SYSTOLIC BLOOD PRESSURE: 135 MMHG | HEIGHT: 73 IN | WEIGHT: 210 LBS | TEMPERATURE: 98.2 F | HEART RATE: 90 BPM | RESPIRATION RATE: 16 BRPM

## 2024-04-18 DIAGNOSIS — R97.20 ELEVATED PSA: ICD-10-CM

## 2024-04-18 DIAGNOSIS — I65.23 BILATERAL CAROTID ARTERY STENOSIS: ICD-10-CM

## 2024-04-18 DIAGNOSIS — Z79.4 TYPE 2 DIABETES MELLITUS WITH HYPERGLYCEMIA, WITH LONG-TERM CURRENT USE OF INSULIN (HCC): Primary | ICD-10-CM

## 2024-04-18 DIAGNOSIS — Z79.4 TYPE 2 DIABETES MELLITUS WITH HYPERGLYCEMIA, WITH LONG-TERM CURRENT USE OF INSULIN (HCC): ICD-10-CM

## 2024-04-18 DIAGNOSIS — I25.10 ATHEROSCLEROSIS OF NATIVE CORONARY ARTERY OF NATIVE HEART WITHOUT ANGINA PECTORIS: ICD-10-CM

## 2024-04-18 DIAGNOSIS — E11.65 TYPE 2 DIABETES MELLITUS WITH HYPERGLYCEMIA, WITH LONG-TERM CURRENT USE OF INSULIN (HCC): Primary | ICD-10-CM

## 2024-04-18 DIAGNOSIS — I10 ESSENTIAL HYPERTENSION: ICD-10-CM

## 2024-04-18 DIAGNOSIS — E11.43 DIABETIC AUTONOMIC NEUROPATHY ASSOCIATED WITH TYPE 2 DIABETES MELLITUS (HCC): ICD-10-CM

## 2024-04-18 DIAGNOSIS — E11.65 TYPE 2 DIABETES MELLITUS WITH HYPERGLYCEMIA, WITH LONG-TERM CURRENT USE OF INSULIN (HCC): ICD-10-CM

## 2024-04-18 DIAGNOSIS — E78.5 DYSLIPIDEMIA: ICD-10-CM

## 2024-04-18 DIAGNOSIS — K21.9 GASTROESOPHAGEAL REFLUX DISEASE WITHOUT ESOPHAGITIS: ICD-10-CM

## 2024-04-18 LAB
ALBUMIN SERPL-MCNC: 3.8 G/DL (ref 3.2–4.6)
ALBUMIN/GLOB SERPL: 1 (ref 0.4–1.6)
ALP SERPL-CCNC: 84 U/L (ref 50–136)
ALT SERPL-CCNC: 28 U/L (ref 12–65)
ANION GAP SERPL CALC-SCNC: 4 MMOL/L (ref 2–11)
AST SERPL-CCNC: 15 U/L (ref 15–37)
BASOPHILS # BLD: 0.1 K/UL (ref 0–0.2)
BASOPHILS NFR BLD: 1 % (ref 0–2)
BILIRUB SERPL-MCNC: 0.5 MG/DL (ref 0.2–1.1)
BILIRUBIN, URINE, POC: NEGATIVE
BLOOD URINE, POC: NORMAL
BUN SERPL-MCNC: 15 MG/DL (ref 8–23)
CALCIUM SERPL-MCNC: 9.7 MG/DL (ref 8.3–10.4)
CHLORIDE SERPL-SCNC: 105 MMOL/L (ref 103–113)
CHOLEST SERPL-MCNC: 205 MG/DL
CO2 SERPL-SCNC: 26 MMOL/L (ref 21–32)
CREAT SERPL-MCNC: 1.3 MG/DL (ref 0.8–1.5)
CREAT UR-MCNC: 103 MG/DL
DIFFERENTIAL METHOD BLD: ABNORMAL
EOSINOPHIL # BLD: 0.2 K/UL (ref 0–0.8)
EOSINOPHIL NFR BLD: 2 % (ref 0.5–7.8)
ERYTHROCYTE [DISTWIDTH] IN BLOOD BY AUTOMATED COUNT: 12 % (ref 11.9–14.6)
GLOBULIN SER CALC-MCNC: 3.9 G/DL (ref 2.8–4.5)
GLUCOSE SERPL-MCNC: 168 MG/DL (ref 65–100)
GLUCOSE URINE, POC: NEGATIVE
HCT VFR BLD AUTO: 48.2 % (ref 41.1–50.3)
HDLC SERPL-MCNC: 36 MG/DL (ref 40–60)
HDLC SERPL: 5.7
HGB BLD-MCNC: 16.6 G/DL (ref 13.6–17.2)
IMM GRANULOCYTES # BLD AUTO: 0 K/UL (ref 0–0.5)
IMM GRANULOCYTES NFR BLD AUTO: 0 % (ref 0–5)
KETONES, URINE, POC: NEGATIVE
LDLC SERPL CALC-MCNC: 142.2 MG/DL
LEUKOCYTE ESTERASE, URINE, POC: NEGATIVE
LYMPHOCYTES # BLD: 1.9 K/UL (ref 0.5–4.6)
LYMPHOCYTES NFR BLD: 19 % (ref 13–44)
MCH RBC QN AUTO: 33.3 PG (ref 26.1–32.9)
MCHC RBC AUTO-ENTMCNC: 34.4 G/DL (ref 31.4–35)
MCV RBC AUTO: 96.8 FL (ref 82–102)
MICROALBUMIN UR-MCNC: 6.72 MG/DL
MICROALBUMIN/CREAT UR-RTO: 65 MG/G (ref 0–30)
MONOCYTES # BLD: 0.7 K/UL (ref 0.1–1.3)
MONOCYTES NFR BLD: 7 % (ref 4–12)
NEUTS SEG # BLD: 7.1 K/UL (ref 1.7–8.2)
NEUTS SEG NFR BLD: 71 % (ref 43–78)
NITRITE, URINE, POC: NEGATIVE
NRBC # BLD: 0 K/UL (ref 0–0.2)
PH, URINE, POC: 6 (ref 4.6–8)
PLATELET # BLD AUTO: 310 K/UL (ref 150–450)
PMV BLD AUTO: 9.2 FL (ref 9.4–12.3)
POTASSIUM SERPL-SCNC: 4.1 MMOL/L (ref 3.5–5.1)
PROT SERPL-MCNC: 7.7 G/DL (ref 6.3–8.2)
PROTEIN,URINE, POC: NORMAL
PSA SERPL-MCNC: 1.2 NG/ML
RBC # BLD AUTO: 4.98 M/UL (ref 4.23–5.6)
SODIUM SERPL-SCNC: 135 MMOL/L (ref 136–146)
SPECIFIC GRAVITY, URINE, POC: 1 (ref 1–1.03)
TRIGL SERPL-MCNC: 134 MG/DL (ref 35–150)
URINALYSIS CLARITY, POC: CLEAR
URINALYSIS COLOR, POC: YELLOW
UROBILINOGEN, POC: NORMAL
VLDLC SERPL CALC-MCNC: 26.8 MG/DL (ref 6–23)
WBC # BLD AUTO: 10 K/UL (ref 4.3–11.1)

## 2024-04-18 RX ORDER — METOPROLOL SUCCINATE 25 MG/1
25 TABLET, EXTENDED RELEASE ORAL DAILY
Qty: 90 TABLET | Refills: 3 | Status: SHIPPED | OUTPATIENT
Start: 2024-04-18

## 2024-04-18 RX ORDER — PRAVASTATIN SODIUM 20 MG
20 TABLET ORAL NIGHTLY
Qty: 90 TABLET | Refills: 3 | Status: SHIPPED | OUTPATIENT
Start: 2024-04-18

## 2024-04-18 RX ORDER — PROCHLORPERAZINE 25 MG/1
SUPPOSITORY RECTAL
Qty: 3 EACH | Refills: 12 | Status: SHIPPED | OUTPATIENT
Start: 2024-04-18

## 2024-04-18 RX ORDER — GABAPENTIN 300 MG/1
300 CAPSULE ORAL DAILY
Qty: 90 CAPSULE | Refills: 1 | Status: SHIPPED | OUTPATIENT
Start: 2024-04-18 | End: 2024-10-15

## 2024-04-18 RX ORDER — PROCHLORPERAZINE 25 MG/1
SUPPOSITORY RECTAL
Qty: 1 EACH | Refills: 0 | Status: SHIPPED | OUTPATIENT
Start: 2024-04-18

## 2024-04-18 RX ORDER — PROCHLORPERAZINE 25 MG/1
SUPPOSITORY RECTAL
Qty: 2 EACH | Refills: 12 | Status: SHIPPED | OUTPATIENT
Start: 2024-04-18 | End: 2024-04-18 | Stop reason: SDUPTHER

## 2024-04-18 RX ORDER — OMEPRAZOLE 20 MG/1
CAPSULE, DELAYED RELEASE ORAL
Qty: 90 CAPSULE | Refills: 3 | Status: SHIPPED | OUTPATIENT
Start: 2024-04-18

## 2024-04-18 RX ORDER — INSULIN DEGLUDEC 100 U/ML
INJECTION, SOLUTION SUBCUTANEOUS
Qty: 12 ML | Refills: 1 | Status: SHIPPED | OUTPATIENT
Start: 2024-04-18

## 2024-04-18 RX ORDER — VALSARTAN 160 MG/1
160 TABLET ORAL DAILY
Qty: 90 TABLET | Refills: 3 | Status: SHIPPED | OUTPATIENT
Start: 2024-04-18

## 2024-04-18 SDOH — ECONOMIC STABILITY: FOOD INSECURITY: WITHIN THE PAST 12 MONTHS, YOU WORRIED THAT YOUR FOOD WOULD RUN OUT BEFORE YOU GOT MONEY TO BUY MORE.: NEVER TRUE

## 2024-04-18 SDOH — ECONOMIC STABILITY: HOUSING INSECURITY
IN THE LAST 12 MONTHS, WAS THERE A TIME WHEN YOU DID NOT HAVE A STEADY PLACE TO SLEEP OR SLEPT IN A SHELTER (INCLUDING NOW)?: NO

## 2024-04-18 SDOH — ECONOMIC STABILITY: FOOD INSECURITY: WITHIN THE PAST 12 MONTHS, THE FOOD YOU BOUGHT JUST DIDN'T LAST AND YOU DIDN'T HAVE MONEY TO GET MORE.: NEVER TRUE

## 2024-04-18 SDOH — ECONOMIC STABILITY: INCOME INSECURITY: HOW HARD IS IT FOR YOU TO PAY FOR THE VERY BASICS LIKE FOOD, HOUSING, MEDICAL CARE, AND HEATING?: NOT HARD AT ALL

## 2024-04-18 ASSESSMENT — PATIENT HEALTH QUESTIONNAIRE - PHQ9
SUM OF ALL RESPONSES TO PHQ QUESTIONS 1-9: 0
2. FEELING DOWN, DEPRESSED OR HOPELESS: NOT AT ALL
SUM OF ALL RESPONSES TO PHQ9 QUESTIONS 1 & 2: 0
SUM OF ALL RESPONSES TO PHQ QUESTIONS 1-9: 0
1. LITTLE INTEREST OR PLEASURE IN DOING THINGS: NOT AT ALL

## 2024-04-18 NOTE — TELEPHONE ENCOUNTER
Needs a change on the RX that was called in today Dexcom Sensor to change every 14 days you have to change then every 10 days call them at [phone]

## 2024-04-18 NOTE — PROGRESS NOTES
the following:      Discharge Meds:  Current Outpatient Medications   Medication Sig Dispense Refill    gabapentin (NEURONTIN) 300 MG capsule Take 1 capsule by mouth daily for 180 days. 90 capsule 1    metoprolol succinate (TOPROL XL) 25 MG extended release tablet Take 1 tablet by mouth daily 90 tablet 3    TRESIBA FLEXTOUCH 100 UNIT/ML SOPN INJECT 40 UNITS SUBCUTANEOUSLY ONCE DAILY 12 mL 1    valsartan (DIOVAN) 160 MG tablet Take 1 tablet by mouth daily 90 tablet 3    omeprazole (PRILOSEC) 20 MG delayed release capsule TAKE 1 CAPSULE BY MOUTH ONCE DAILY 30  MINUTES  BEFORE  A  MEAL 90 capsule 3    pravastatin (PRAVACHOL) 20 MG tablet Take 1 tablet by mouth nightly 90 tablet 3    Continuous Blood Gluc Sensor (DEXCOM G6 SENSOR) MISC Check blood sugar fasting and 2 hours after meals; change every 14 days 2 each 12    Continuous Blood Gluc Transmit (DEXCOM G6 TRANSMITTER) MISC Check blood sugar fasting and 2 hours after meals 1 each 0    Continuous Blood Gluc  (DEXCOM G6 ) MAGUI Check blood sugar fasting and 2 hours after meals 1 each 0    BRILINTA 60 MG TABS tablet Take 1 tablet by mouth twice daily 60 tablet 0    aspirin 81 MG chewable tablet Take 1 tablet by mouth in the morning and 1 tablet in the evening.      cyclobenzaprine (FLEXERIL) 5 MG tablet Take 1 tablet by mouth 3 times daily as needed      nitroGLYCERIN (NITROSTAT) 0.4 MG SL tablet Place 1 tablet under the tongue       No current facility-administered medications for this visit.         Return in about 6 months (around 10/18/2024) for follow up and sub wellness.        Any new medications were explained today including any potential drug interactions or significant side effects.  The patient's questions in regards to this were answered today.    Dictated using voice recognition software.  Proofread, but unrecognized errors may exist.

## 2024-04-19 LAB
EST. AVERAGE GLUCOSE BLD GHB EST-MCNC: 217 MG/DL
HBA1C MFR BLD: 9.2 % (ref 4.8–5.6)

## 2024-04-19 NOTE — RESULT ENCOUNTER NOTE
Call back A1c level significantly elevated once again at 9.2%.  This is an estimated average glucose of 217.  Patient should get his continuous glucose monitor and check fasting and 2-hour postprandial blood sugars regularly and report these back to me in 2 weeks so I can make an adjustment.  Also very mild amount of protein in the urine.  Lipids have improved from a year ago but still elevated.  Really needs to watch saturated fats in diet.  Kidney function similar to prior with GFR 58.  Normal liver functions.  White blood cell count hemoglobin and platelets okay.  PSA level 1.2.

## 2024-05-09 DIAGNOSIS — I21.4 NSTEMI (NON-ST ELEVATED MYOCARDIAL INFARCTION) (HCC): ICD-10-CM

## 2024-05-09 DIAGNOSIS — I25.10 ATHEROSCLEROSIS OF NATIVE CORONARY ARTERY OF NATIVE HEART WITHOUT ANGINA PECTORIS: ICD-10-CM

## 2024-05-09 RX ORDER — TICAGRELOR 60 MG/1
60 TABLET ORAL 2 TIMES DAILY
Qty: 60 TABLET | Refills: 0 | Status: SHIPPED | OUTPATIENT
Start: 2024-05-09

## 2024-06-08 DIAGNOSIS — Z79.4 TYPE 2 DIABETES MELLITUS WITH HYPERGLYCEMIA, WITH LONG-TERM CURRENT USE OF INSULIN (HCC): ICD-10-CM

## 2024-06-08 DIAGNOSIS — E11.65 TYPE 2 DIABETES MELLITUS WITH HYPERGLYCEMIA, WITH LONG-TERM CURRENT USE OF INSULIN (HCC): ICD-10-CM

## 2024-06-10 RX ORDER — INSULIN DEGLUDEC 100 U/ML
INJECTION, SOLUTION SUBCUTANEOUS
Qty: 12 ML | Refills: 2 | Status: SHIPPED | OUTPATIENT
Start: 2024-06-10

## 2024-06-12 DIAGNOSIS — I21.4 NSTEMI (NON-ST ELEVATED MYOCARDIAL INFARCTION) (HCC): ICD-10-CM

## 2024-06-12 DIAGNOSIS — I25.10 ATHEROSCLEROSIS OF NATIVE CORONARY ARTERY OF NATIVE HEART WITHOUT ANGINA PECTORIS: ICD-10-CM

## 2024-06-12 RX ORDER — TICAGRELOR 60 MG/1
60 TABLET ORAL 2 TIMES DAILY
Qty: 60 TABLET | Refills: 0 | Status: SHIPPED | OUTPATIENT
Start: 2024-06-12

## 2024-07-17 DIAGNOSIS — I21.4 NSTEMI (NON-ST ELEVATED MYOCARDIAL INFARCTION) (HCC): ICD-10-CM

## 2024-07-17 DIAGNOSIS — I25.10 ATHEROSCLEROSIS OF NATIVE CORONARY ARTERY OF NATIVE HEART WITHOUT ANGINA PECTORIS: ICD-10-CM

## 2024-07-17 RX ORDER — TICAGRELOR 60 MG/1
60 TABLET ORAL 2 TIMES DAILY
Qty: 60 TABLET | Refills: 0 | Status: SHIPPED | OUTPATIENT
Start: 2024-07-17

## 2024-08-18 DIAGNOSIS — I25.10 ATHEROSCLEROSIS OF NATIVE CORONARY ARTERY OF NATIVE HEART WITHOUT ANGINA PECTORIS: ICD-10-CM

## 2024-08-18 DIAGNOSIS — I21.4 NSTEMI (NON-ST ELEVATED MYOCARDIAL INFARCTION) (HCC): ICD-10-CM

## 2024-08-18 RX ORDER — TICAGRELOR 60 MG/1
60 TABLET ORAL 2 TIMES DAILY
Qty: 60 TABLET | Refills: 0 | Status: SHIPPED | OUTPATIENT
Start: 2024-08-18

## 2024-09-13 DIAGNOSIS — Z79.4 TYPE 2 DIABETES MELLITUS WITH HYPERGLYCEMIA, WITH LONG-TERM CURRENT USE OF INSULIN (HCC): ICD-10-CM

## 2024-09-13 DIAGNOSIS — E11.65 TYPE 2 DIABETES MELLITUS WITH HYPERGLYCEMIA, WITH LONG-TERM CURRENT USE OF INSULIN (HCC): ICD-10-CM

## 2024-09-13 RX ORDER — INSULIN DEGLUDEC 100 U/ML
INJECTION, SOLUTION SUBCUTANEOUS
Qty: 12 ML | Refills: 0 | Status: SHIPPED | OUTPATIENT
Start: 2024-09-13

## 2024-09-22 DIAGNOSIS — I25.10 ATHEROSCLEROSIS OF NATIVE CORONARY ARTERY OF NATIVE HEART WITHOUT ANGINA PECTORIS: ICD-10-CM

## 2024-09-22 DIAGNOSIS — I21.4 NSTEMI (NON-ST ELEVATED MYOCARDIAL INFARCTION) (HCC): ICD-10-CM

## 2024-09-23 RX ORDER — TICAGRELOR 60 MG/1
60 TABLET ORAL 2 TIMES DAILY
Qty: 60 TABLET | Refills: 0 | Status: SHIPPED | OUTPATIENT
Start: 2024-09-23

## 2024-10-25 DIAGNOSIS — I25.10 ATHEROSCLEROSIS OF NATIVE CORONARY ARTERY OF NATIVE HEART WITHOUT ANGINA PECTORIS: ICD-10-CM

## 2024-10-25 DIAGNOSIS — I21.4 NSTEMI (NON-ST ELEVATED MYOCARDIAL INFARCTION) (HCC): ICD-10-CM

## 2024-10-25 RX ORDER — TICAGRELOR 60 MG/1
60 TABLET ORAL 2 TIMES DAILY
Qty: 60 TABLET | Refills: 0 | Status: SHIPPED | OUTPATIENT
Start: 2024-10-25

## 2024-11-01 DIAGNOSIS — E11.43 DIABETIC AUTONOMIC NEUROPATHY ASSOCIATED WITH TYPE 2 DIABETES MELLITUS (HCC): ICD-10-CM

## 2024-11-01 RX ORDER — GABAPENTIN 300 MG/1
300 CAPSULE ORAL DAILY
Qty: 90 CAPSULE | Refills: 0 | Status: SHIPPED | OUTPATIENT
Start: 2024-11-01 | End: 2025-01-30

## 2024-11-21 DIAGNOSIS — I25.10 ATHEROSCLEROSIS OF NATIVE CORONARY ARTERY OF NATIVE HEART WITHOUT ANGINA PECTORIS: ICD-10-CM

## 2024-11-21 DIAGNOSIS — I21.4 NSTEMI (NON-ST ELEVATED MYOCARDIAL INFARCTION) (HCC): ICD-10-CM

## 2024-11-21 RX ORDER — TICAGRELOR 60 MG/1
60 TABLET ORAL 2 TIMES DAILY
Qty: 60 TABLET | Refills: 0 | Status: SHIPPED | OUTPATIENT
Start: 2024-11-21

## 2024-11-25 DIAGNOSIS — Z79.4 TYPE 2 DIABETES MELLITUS WITH HYPERGLYCEMIA, WITH LONG-TERM CURRENT USE OF INSULIN (HCC): ICD-10-CM

## 2024-11-25 DIAGNOSIS — E11.65 TYPE 2 DIABETES MELLITUS WITH HYPERGLYCEMIA, WITH LONG-TERM CURRENT USE OF INSULIN (HCC): ICD-10-CM

## 2024-11-26 RX ORDER — INSULIN DEGLUDEC 100 U/ML
INJECTION, SOLUTION SUBCUTANEOUS
Qty: 12 ML | Refills: 0 | Status: SHIPPED | OUTPATIENT
Start: 2024-11-26

## 2024-12-20 DIAGNOSIS — I21.4 NSTEMI (NON-ST ELEVATED MYOCARDIAL INFARCTION) (HCC): ICD-10-CM

## 2024-12-20 DIAGNOSIS — I25.10 ATHEROSCLEROSIS OF NATIVE CORONARY ARTERY OF NATIVE HEART WITHOUT ANGINA PECTORIS: ICD-10-CM

## 2024-12-22 DIAGNOSIS — E11.65 TYPE 2 DIABETES MELLITUS WITH HYPERGLYCEMIA, WITH LONG-TERM CURRENT USE OF INSULIN (HCC): ICD-10-CM

## 2024-12-22 DIAGNOSIS — Z79.4 TYPE 2 DIABETES MELLITUS WITH HYPERGLYCEMIA, WITH LONG-TERM CURRENT USE OF INSULIN (HCC): ICD-10-CM

## 2024-12-23 RX ORDER — INSULIN DEGLUDEC 100 U/ML
INJECTION, SOLUTION SUBCUTANEOUS
Qty: 12 ML | Refills: 0 | OUTPATIENT
Start: 2024-12-23

## 2024-12-25 RX ORDER — TICAGRELOR 60 MG/1
60 TABLET ORAL 2 TIMES DAILY
Qty: 60 TABLET | Refills: 0 | Status: SHIPPED | OUTPATIENT
Start: 2024-12-25

## 2025-01-06 DIAGNOSIS — Z79.4 TYPE 2 DIABETES MELLITUS WITH HYPERGLYCEMIA, WITH LONG-TERM CURRENT USE OF INSULIN (HCC): ICD-10-CM

## 2025-01-06 DIAGNOSIS — E11.65 TYPE 2 DIABETES MELLITUS WITH HYPERGLYCEMIA, WITH LONG-TERM CURRENT USE OF INSULIN (HCC): ICD-10-CM

## 2025-01-06 RX ORDER — INSULIN DEGLUDEC 100 U/ML
INJECTION, SOLUTION SUBCUTANEOUS
Qty: 12 ML | Refills: 1 | Status: SHIPPED | OUTPATIENT
Start: 2025-01-06

## 2025-01-10 ENCOUNTER — TELEPHONE (OUTPATIENT)
Dept: FAMILY MEDICINE CLINIC | Facility: CLINIC | Age: 75
End: 2025-01-10

## 2025-01-24 DIAGNOSIS — I25.10 ATHEROSCLEROSIS OF NATIVE CORONARY ARTERY OF NATIVE HEART WITHOUT ANGINA PECTORIS: ICD-10-CM

## 2025-01-24 DIAGNOSIS — I21.4 NSTEMI (NON-ST ELEVATED MYOCARDIAL INFARCTION) (HCC): ICD-10-CM

## 2025-01-24 RX ORDER — TICAGRELOR 60 MG/1
60 TABLET ORAL 2 TIMES DAILY
Qty: 60 TABLET | Refills: 0 | Status: SHIPPED | OUTPATIENT
Start: 2025-01-24

## 2025-01-29 DIAGNOSIS — E11.43 DIABETIC AUTONOMIC NEUROPATHY ASSOCIATED WITH TYPE 2 DIABETES MELLITUS (HCC): ICD-10-CM

## 2025-01-29 RX ORDER — GABAPENTIN 300 MG/1
300 CAPSULE ORAL DAILY
Qty: 90 CAPSULE | Refills: 1 | Status: SHIPPED | OUTPATIENT
Start: 2025-01-29 | End: 2025-10-28

## 2025-02-24 DIAGNOSIS — I25.10 ATHEROSCLEROSIS OF NATIVE CORONARY ARTERY OF NATIVE HEART WITHOUT ANGINA PECTORIS: ICD-10-CM

## 2025-02-24 DIAGNOSIS — I21.4 NSTEMI (NON-ST ELEVATED MYOCARDIAL INFARCTION) (HCC): ICD-10-CM

## 2025-02-24 NOTE — TELEPHONE ENCOUNTER
Requested Prescriptions     Pending Prescriptions Disp Refills    ticagrelor (BRILINTA) 60 MG TABS tablet 100 tablet 0     Sig: Take 1 tablet by mouth 2 times daily     Verified rx. Last OV 10/21/2022. Erx to pharm on file.    Spoke to pt and made overdue f/u for April 9th.

## 2025-03-03 ENCOUNTER — TELEPHONE (OUTPATIENT)
Dept: FAMILY MEDICINE CLINIC | Facility: CLINIC | Age: 75
End: 2025-03-03

## 2025-03-03 DIAGNOSIS — Z79.4 TYPE 2 DIABETES MELLITUS WITH HYPERGLYCEMIA, WITH LONG-TERM CURRENT USE OF INSULIN (HCC): ICD-10-CM

## 2025-03-03 DIAGNOSIS — E11.65 TYPE 2 DIABETES MELLITUS WITH HYPERGLYCEMIA, WITH LONG-TERM CURRENT USE OF INSULIN (HCC): ICD-10-CM

## 2025-03-03 RX ORDER — INSULIN GLARGINE 100 [IU]/ML
40 INJECTION, SOLUTION SUBCUTANEOUS NIGHTLY
Qty: 15 ADJUSTABLE DOSE PRE-FILLED PEN SYRINGE | Refills: 0 | Status: SHIPPED | OUTPATIENT
Start: 2025-03-03

## 2025-03-03 NOTE — TELEPHONE ENCOUNTER
[facility] in [place] calling to clarification on tresiba flextouch. [facility] stating it needs to be basaglar due to insrance purposes and tresiba is on back order. Please advise.

## 2025-04-04 ENCOUNTER — OFFICE VISIT (OUTPATIENT)
Age: 75
End: 2025-04-04

## 2025-04-04 VITALS
BODY MASS INDEX: 27.83 KG/M2 | DIASTOLIC BLOOD PRESSURE: 70 MMHG | HEIGHT: 73 IN | SYSTOLIC BLOOD PRESSURE: 130 MMHG | WEIGHT: 210 LBS | HEART RATE: 73 BPM

## 2025-04-04 DIAGNOSIS — I10 ESSENTIAL HYPERTENSION: ICD-10-CM

## 2025-04-04 DIAGNOSIS — I25.10 ATHEROSCLEROSIS OF NATIVE CORONARY ARTERY OF NATIVE HEART WITHOUT ANGINA PECTORIS: Primary | ICD-10-CM

## 2025-04-04 DIAGNOSIS — I49.3 PVC (PREMATURE VENTRICULAR CONTRACTION): ICD-10-CM

## 2025-04-04 DIAGNOSIS — E78.5 DYSLIPIDEMIA: ICD-10-CM

## 2025-04-04 DIAGNOSIS — I25.10 ATHEROSCLEROSIS OF NATIVE CORONARY ARTERY OF NATIVE HEART WITHOUT ANGINA PECTORIS: ICD-10-CM

## 2025-04-04 RX ORDER — VALSARTAN 160 MG/1
160 TABLET ORAL DAILY
Qty: 90 TABLET | Refills: 0 | Status: SHIPPED | OUTPATIENT
Start: 2025-04-04

## 2025-04-04 RX ORDER — METOPROLOL SUCCINATE 25 MG/1
25 TABLET, EXTENDED RELEASE ORAL DAILY
Qty: 90 TABLET | Refills: 0 | Status: SHIPPED | OUTPATIENT
Start: 2025-04-04

## 2025-04-04 ASSESSMENT — ENCOUNTER SYMPTOMS
SHORTNESS OF BREATH: 0
CHEST TIGHTNESS: 0
DIARRHEA: 0
HEMATOCHEZIA: 0
HEMATEMESIS: 0
ORTHOPNEA: 0
BLURRED VISION: 0
COLOR CHANGE: 0
HOARSE VOICE: 0
WHEEZING: 0
BOWEL INCONTINENCE: 0
ABDOMINAL PAIN: 0
SPUTUM PRODUCTION: 0

## 2025-04-04 NOTE — PROGRESS NOTES
Occluded mRCA:3 2.5 X 15 [place] AZUCNEA& 1 2.5 X 8 [place] AZUCENA. 90% mLAD:2.5 x 15 OnyxDES    LEFT HEART CATH,PERCUTANEOUS  2014    xience to mRCA    ORTHOPEDIC SURGERY Right 10/20/2020    Dr. Damien Waggoner; ankle ORIF    ORTHOPEDIC SURGERY  20 yrs ago    bilateral shoulder scope    UPPER GASTROINTESTINAL ENDOSCOPY  10/16/15    Dr. Burnette; Grade A esophagitis     Family History   Problem Relation Age of Onset    Heart Disease Brother 56        5 bypass at one time    Cancer Mother 65        breast    Psychiatric Disorder Mother         Alzheimers    Heart Disease Father     Heart Disease Sister     COPD Brother     Heart Disease Sister     No Known Problems Sister       Social History     Tobacco Use    Smoking status: Former     Current packs/day: 0.00     Types: Cigarettes     Quit date: 1980     Years since quittin.2    Smokeless tobacco: Never    Tobacco comments:     Quit smoking: did smoke 1ppd x 40 yrs-- now only occ   Substance Use Topics    Alcohol use: Yes     Alcohol/week: 0.0 standard drinks of alcohol       ROS:    Review of Systems   Constitutional: Negative for chills, decreased appetite, diaphoresis, fever, malaise/fatigue and weight gain.   HENT:  Negative for congestion, hearing loss, hoarse voice and nosebleeds.    Eyes:  Negative for blurred vision.   Cardiovascular:  Negative for chest pain, claudication, cyanosis, dyspnea on exertion, irregular heartbeat, leg swelling, near-syncope, orthopnea, palpitations, paroxysmal nocturnal dyspnea and syncope.   Respiratory:  Negative for chest tightness, shortness of breath, sputum production and wheezing.    Endocrine: Negative for polydipsia, polyphagia and polyuria.   Skin:  Negative for color change.   Musculoskeletal:  Negative for muscle weakness.   Gastrointestinal:  Negative for abdominal pain, bowel incontinence, diarrhea, hematemesis and hematochezia.   Genitourinary:  Negative for dysuria, frequency and hematuria.   Neurological:

## 2025-04-09 ENCOUNTER — TELEPHONE (OUTPATIENT)
Dept: FAMILY MEDICINE CLINIC | Facility: CLINIC | Age: 75
End: 2025-04-09

## 2025-04-09 DIAGNOSIS — Z79.4 TYPE 2 DIABETES MELLITUS WITH HYPERGLYCEMIA, WITH LONG-TERM CURRENT USE OF INSULIN (HCC): ICD-10-CM

## 2025-04-09 DIAGNOSIS — E11.65 TYPE 2 DIABETES MELLITUS WITH HYPERGLYCEMIA, WITH LONG-TERM CURRENT USE OF INSULIN (HCC): ICD-10-CM

## 2025-04-09 NOTE — TELEPHONE ENCOUNTER
Pt calling for a refill on his basaglar pen. Please send to [facility] in [place]. Pts next appt is 05/27/25. Pt only has one dose left. Asking for more than 1 refill to be sent.

## 2025-04-10 RX ORDER — INSULIN GLARGINE 100 [IU]/ML
40 INJECTION, SOLUTION SUBCUTANEOUS NIGHTLY
Qty: 15 ADJUSTABLE DOSE PRE-FILLED PEN SYRINGE | Refills: 0 | Status: SHIPPED | OUTPATIENT
Start: 2025-04-10

## 2025-04-15 DIAGNOSIS — E78.5 DYSLIPIDEMIA: ICD-10-CM

## 2025-04-15 DIAGNOSIS — K21.9 GASTROESOPHAGEAL REFLUX DISEASE WITHOUT ESOPHAGITIS: ICD-10-CM

## 2025-04-15 RX ORDER — OMEPRAZOLE 20 MG/1
CAPSULE, DELAYED RELEASE ORAL
Qty: 90 CAPSULE | Refills: 0 | Status: SHIPPED | OUTPATIENT
Start: 2025-04-15

## 2025-04-15 RX ORDER — PRAVASTATIN SODIUM 20 MG
20 TABLET ORAL NIGHTLY
Qty: 90 TABLET | Refills: 0 | Status: SHIPPED | OUTPATIENT
Start: 2025-04-15

## 2025-04-19 DIAGNOSIS — I21.4 NSTEMI (NON-ST ELEVATED MYOCARDIAL INFARCTION) (HCC): ICD-10-CM

## 2025-04-19 DIAGNOSIS — I25.10 ATHEROSCLEROSIS OF NATIVE CORONARY ARTERY OF NATIVE HEART WITHOUT ANGINA PECTORIS: ICD-10-CM

## 2025-04-21 RX ORDER — TICAGRELOR 60 MG/1
60 TABLET ORAL 2 TIMES DAILY
Qty: 180 TABLET | Refills: 3 | Status: SHIPPED | OUTPATIENT
Start: 2025-04-21

## 2025-05-12 DIAGNOSIS — E11.65 TYPE 2 DIABETES MELLITUS WITH HYPERGLYCEMIA, WITH LONG-TERM CURRENT USE OF INSULIN (HCC): ICD-10-CM

## 2025-05-12 DIAGNOSIS — Z79.4 TYPE 2 DIABETES MELLITUS WITH HYPERGLYCEMIA, WITH LONG-TERM CURRENT USE OF INSULIN (HCC): ICD-10-CM

## 2025-05-12 RX ORDER — INSULIN GLARGINE 100 [IU]/ML
INJECTION, SOLUTION SUBCUTANEOUS
Qty: 15 ML | Refills: 0 | Status: SHIPPED | OUTPATIENT
Start: 2025-05-12

## 2025-05-27 ENCOUNTER — RESULTS FOLLOW-UP (OUTPATIENT)
Dept: FAMILY MEDICINE CLINIC | Facility: CLINIC | Age: 75
End: 2025-05-27

## 2025-05-27 ENCOUNTER — OFFICE VISIT (OUTPATIENT)
Dept: FAMILY MEDICINE CLINIC | Facility: CLINIC | Age: 75
End: 2025-05-27
Payer: MEDICARE

## 2025-05-27 VITALS
OXYGEN SATURATION: 99 % | HEART RATE: 52 BPM | HEIGHT: 73 IN | SYSTOLIC BLOOD PRESSURE: 140 MMHG | DIASTOLIC BLOOD PRESSURE: 75 MMHG | TEMPERATURE: 97.8 F | RESPIRATION RATE: 12 BRPM | WEIGHT: 207 LBS | BODY MASS INDEX: 27.43 KG/M2

## 2025-05-27 DIAGNOSIS — I50.32 CHRONIC DIASTOLIC (CONGESTIVE) HEART FAILURE (HCC): ICD-10-CM

## 2025-05-27 DIAGNOSIS — I10 ESSENTIAL HYPERTENSION: ICD-10-CM

## 2025-05-27 DIAGNOSIS — Z86.0100 HISTORY OF COLON POLYPS: ICD-10-CM

## 2025-05-27 DIAGNOSIS — E11.43 DIABETIC AUTONOMIC NEUROPATHY ASSOCIATED WITH TYPE 2 DIABETES MELLITUS (HCC): ICD-10-CM

## 2025-05-27 DIAGNOSIS — Z79.4 TYPE 2 DIABETES MELLITUS WITH STAGE 3A CHRONIC KIDNEY DISEASE, WITH LONG-TERM CURRENT USE OF INSULIN (HCC): ICD-10-CM

## 2025-05-27 DIAGNOSIS — I25.10 ATHEROSCLEROSIS OF NATIVE CORONARY ARTERY OF NATIVE HEART WITHOUT ANGINA PECTORIS: Primary | ICD-10-CM

## 2025-05-27 DIAGNOSIS — K21.9 GASTROESOPHAGEAL REFLUX DISEASE WITHOUT ESOPHAGITIS: ICD-10-CM

## 2025-05-27 DIAGNOSIS — N18.31 TYPE 2 DIABETES MELLITUS WITH STAGE 3A CHRONIC KIDNEY DISEASE, WITH LONG-TERM CURRENT USE OF INSULIN (HCC): ICD-10-CM

## 2025-05-27 DIAGNOSIS — R35.1 BPH ASSOCIATED WITH NOCTURIA: ICD-10-CM

## 2025-05-27 DIAGNOSIS — E78.5 DYSLIPIDEMIA: ICD-10-CM

## 2025-05-27 DIAGNOSIS — E11.22 TYPE 2 DIABETES MELLITUS WITH STAGE 3A CHRONIC KIDNEY DISEASE, WITH LONG-TERM CURRENT USE OF INSULIN (HCC): ICD-10-CM

## 2025-05-27 DIAGNOSIS — N40.1 BPH ASSOCIATED WITH NOCTURIA: ICD-10-CM

## 2025-05-27 DIAGNOSIS — I25.10 ATHEROSCLEROSIS OF NATIVE CORONARY ARTERY OF NATIVE HEART WITHOUT ANGINA PECTORIS: ICD-10-CM

## 2025-05-27 DIAGNOSIS — E11.65 UNCONTROLLED TYPE 2 DIABETES MELLITUS WITH HYPERGLYCEMIA (HCC): Primary | ICD-10-CM

## 2025-05-27 DIAGNOSIS — I65.23 BILATERAL CAROTID ARTERY STENOSIS: ICD-10-CM

## 2025-05-27 LAB
ALBUMIN SERPL-MCNC: 3.6 G/DL (ref 3.2–4.6)
ALBUMIN/GLOB SERPL: 0.9 (ref 1–1.9)
ALP SERPL-CCNC: 81 U/L (ref 40–129)
ALT SERPL-CCNC: 15 U/L (ref 8–55)
ANION GAP SERPL CALC-SCNC: 10 MMOL/L (ref 7–16)
AST SERPL-CCNC: 24 U/L (ref 15–37)
BASOPHILS # BLD: 0.06 K/UL (ref 0–0.2)
BASOPHILS NFR BLD: 0.6 % (ref 0–2)
BILIRUB SERPL-MCNC: 0.5 MG/DL (ref 0–1.2)
BILIRUBIN, URINE, POC: NEGATIVE
BLOOD URINE, POC: NORMAL
BUN SERPL-MCNC: 22 MG/DL (ref 8–23)
CALCIUM SERPL-MCNC: 9.8 MG/DL (ref 8.8–10.2)
CHLORIDE SERPL-SCNC: 104 MMOL/L (ref 98–107)
CHOLEST SERPL-MCNC: 192 MG/DL (ref 0–200)
CO2 SERPL-SCNC: 25 MMOL/L (ref 20–29)
CREAT SERPL-MCNC: 1.21 MG/DL (ref 0.8–1.3)
CREAT UR-MCNC: 31.6 MG/DL (ref 39–259)
DIFFERENTIAL METHOD BLD: ABNORMAL
EOSINOPHIL # BLD: 0.2 K/UL (ref 0–0.8)
EOSINOPHIL NFR BLD: 2.1 % (ref 0.5–7.8)
ERYTHROCYTE [DISTWIDTH] IN BLOOD BY AUTOMATED COUNT: 12.4 % (ref 11.9–14.6)
EST. AVERAGE GLUCOSE BLD GHB EST-MCNC: 217 MG/DL
GLOBULIN SER CALC-MCNC: 4 G/DL (ref 2.3–3.5)
GLUCOSE SERPL-MCNC: 148 MG/DL (ref 70–99)
GLUCOSE URINE, POC: NEGATIVE
HBA1C MFR BLD: 9.2 % (ref 0–5.6)
HCT VFR BLD AUTO: 46.9 % (ref 41.1–50.3)
HDLC SERPL-MCNC: 33 MG/DL (ref 40–60)
HDLC SERPL: 5.7 (ref 0–5)
HGB BLD-MCNC: 15.6 G/DL (ref 13.6–17.2)
IMM GRANULOCYTES # BLD AUTO: 0.04 K/UL (ref 0–0.5)
IMM GRANULOCYTES NFR BLD AUTO: 0.4 % (ref 0–5)
KETONES, URINE, POC: NEGATIVE
LDLC SERPL CALC-MCNC: 138 MG/DL (ref 0–100)
LEUKOCYTE ESTERASE, URINE, POC: NEGATIVE
LYMPHOCYTES # BLD: 1.7 K/UL (ref 0.5–4.6)
LYMPHOCYTES NFR BLD: 18.1 % (ref 13–44)
MCH RBC QN AUTO: 33.1 PG (ref 26.1–32.9)
MCHC RBC AUTO-ENTMCNC: 33.3 G/DL (ref 31.4–35)
MCV RBC AUTO: 99.6 FL (ref 82–102)
MICROALBUMIN UR-MCNC: 4 MG/DL (ref 0–20)
MICROALBUMIN/CREAT UR-RTO: 127 MG/G (ref 0–30)
MONOCYTES # BLD: 0.75 K/UL (ref 0.1–1.3)
MONOCYTES NFR BLD: 8 % (ref 4–12)
NEUTS SEG # BLD: 6.65 K/UL (ref 1.7–8.2)
NEUTS SEG NFR BLD: 70.8 % (ref 43–78)
NITRITE, URINE, POC: NEGATIVE
NRBC # BLD: 0 K/UL (ref 0–0.2)
PH, URINE, POC: 5 (ref 4.6–8)
PLATELET # BLD AUTO: 269 K/UL (ref 150–450)
PMV BLD AUTO: 9.5 FL (ref 9.4–12.3)
POTASSIUM SERPL-SCNC: 4.2 MMOL/L (ref 3.5–5.1)
PROT SERPL-MCNC: 7.5 G/DL (ref 6.3–8.2)
PROTEIN,URINE, POC: NORMAL
PSA SERPL-MCNC: 1.1 NG/ML (ref 0–4)
RBC # BLD AUTO: 4.71 M/UL (ref 4.23–5.6)
SODIUM SERPL-SCNC: 138 MMOL/L (ref 136–145)
SPECIFIC GRAVITY, URINE, POC: 1 (ref 1–1.03)
TRIGL SERPL-MCNC: 103 MG/DL (ref 0–150)
URINALYSIS CLARITY, POC: CLEAR
URINALYSIS COLOR, POC: YELLOW
UROBILINOGEN, POC: NORMAL
VLDLC SERPL CALC-MCNC: 21 MG/DL (ref 6–23)
WBC # BLD AUTO: 9.4 K/UL (ref 4.3–11.1)

## 2025-05-27 PROCEDURE — 99214 OFFICE O/P EST MOD 30 MIN: CPT | Performed by: FAMILY MEDICINE

## 2025-05-27 PROCEDURE — 3078F DIAST BP <80 MM HG: CPT | Performed by: FAMILY MEDICINE

## 2025-05-27 PROCEDURE — 1123F ACP DISCUSS/DSCN MKR DOCD: CPT | Performed by: FAMILY MEDICINE

## 2025-05-27 PROCEDURE — 81003 URINALYSIS AUTO W/O SCOPE: CPT | Performed by: FAMILY MEDICINE

## 2025-05-27 PROCEDURE — 1160F RVW MEDS BY RX/DR IN RCRD: CPT | Performed by: FAMILY MEDICINE

## 2025-05-27 PROCEDURE — G8419 CALC BMI OUT NRM PARAM NOF/U: HCPCS | Performed by: FAMILY MEDICINE

## 2025-05-27 PROCEDURE — 3077F SYST BP >= 140 MM HG: CPT | Performed by: FAMILY MEDICINE

## 2025-05-27 PROCEDURE — G8427 DOCREV CUR MEDS BY ELIG CLIN: HCPCS | Performed by: FAMILY MEDICINE

## 2025-05-27 PROCEDURE — 1159F MED LIST DOCD IN RCRD: CPT | Performed by: FAMILY MEDICINE

## 2025-05-27 PROCEDURE — 3017F COLORECTAL CA SCREEN DOC REV: CPT | Performed by: FAMILY MEDICINE

## 2025-05-27 PROCEDURE — 2022F DILAT RTA XM EVC RTNOPTHY: CPT | Performed by: FAMILY MEDICINE

## 2025-05-27 PROCEDURE — 1036F TOBACCO NON-USER: CPT | Performed by: FAMILY MEDICINE

## 2025-05-27 PROCEDURE — 3046F HEMOGLOBIN A1C LEVEL >9.0%: CPT | Performed by: FAMILY MEDICINE

## 2025-05-27 RX ORDER — INSULIN GLARGINE 100 [IU]/ML
38 INJECTION, SOLUTION SUBCUTANEOUS NIGHTLY
Qty: 15 ML | Refills: 3 | Status: SHIPPED | OUTPATIENT
Start: 2025-05-27

## 2025-05-27 RX ORDER — METOPROLOL SUCCINATE 25 MG/1
25 TABLET, EXTENDED RELEASE ORAL DAILY
Qty: 90 TABLET | Refills: 3 | Status: SHIPPED | OUTPATIENT
Start: 2025-05-27

## 2025-05-27 RX ORDER — VALSARTAN 160 MG/1
160 TABLET ORAL DAILY
Qty: 90 TABLET | Refills: 3 | Status: SHIPPED | OUTPATIENT
Start: 2025-05-27

## 2025-05-27 RX ORDER — ROSUVASTATIN CALCIUM 20 MG/1
20 TABLET, COATED ORAL DAILY
Qty: 30 TABLET | Refills: 4 | Status: SHIPPED | OUTPATIENT
Start: 2025-05-27

## 2025-05-27 RX ORDER — ACYCLOVIR 400 MG/1
TABLET ORAL
Qty: 1 EACH | Refills: 12 | Status: SHIPPED | OUTPATIENT
Start: 2025-05-27

## 2025-05-27 RX ORDER — OMEPRAZOLE 20 MG/1
CAPSULE, DELAYED RELEASE ORAL
Qty: 90 CAPSULE | Refills: 3 | Status: SHIPPED | OUTPATIENT
Start: 2025-05-27

## 2025-05-27 RX ORDER — GABAPENTIN 300 MG/1
300 CAPSULE ORAL DAILY
Qty: 90 CAPSULE | Refills: 1 | Status: SHIPPED | OUTPATIENT
Start: 2025-05-27 | End: 2026-02-23

## 2025-05-27 SDOH — ECONOMIC STABILITY: FOOD INSECURITY: WITHIN THE PAST 12 MONTHS, YOU WORRIED THAT YOUR FOOD WOULD RUN OUT BEFORE YOU GOT MONEY TO BUY MORE.: NEVER TRUE

## 2025-05-27 SDOH — ECONOMIC STABILITY: FOOD INSECURITY: WITHIN THE PAST 12 MONTHS, THE FOOD YOU BOUGHT JUST DIDN'T LAST AND YOU DIDN'T HAVE MONEY TO GET MORE.: NEVER TRUE

## 2025-05-27 ASSESSMENT — PATIENT HEALTH QUESTIONNAIRE - PHQ9
1. LITTLE INTEREST OR PLEASURE IN DOING THINGS: NOT AT ALL
SUM OF ALL RESPONSES TO PHQ QUESTIONS 1-9: 0
SUM OF ALL RESPONSES TO PHQ QUESTIONS 1-9: 0
2. FEELING DOWN, DEPRESSED OR HOPELESS: NOT AT ALL
SUM OF ALL RESPONSES TO PHQ QUESTIONS 1-9: 0
SUM OF ALL RESPONSES TO PHQ QUESTIONS 1-9: 0

## 2025-05-27 NOTE — PROGRESS NOTES
[facility] [place] [address]  Phone: [phone] Fax [phone]  Trung Cueto M.D.  5/27/2025        Miguelito Hwang is a 75 y.o. male     HPI:  Patient is seen for Follow-up (Type 2 diabetes mellitus with hyperglycemia, with long-term current use of insulin)    Patient comes in today for follow-up.  States that blood sugars are very.  Usually morning blood sugars are between 80 and 140s.  States it depends upon what he eats the night before.  Additionally states that 2-hour postprandial blood sugars are usually less than 180 and most of the time less than 160.  Does not describe any significant hypoglycemia.  Currently taking 38 units of Basaglar but has noticed more fluctuation with this versus prior Tresiba by report.     Patient did see Dr. Ja Johnson, cardiologist, last month.  He emphasized that goal LDL is less than 70.  Patient currently taking pravastatin.  If labs today do not show LDL less than 70, he would like patient to try Crestor again.  Patient reports being willing to try this once again if needed.  He has not had recent carotid studies.    He reports staying very active.  He is still .  Describes some stress with his job at times.  Denies any depression.  No thoughts of harming himself or others.  No breakthrough heartburn or reflux on his omeprazole.  He describes 2 episodes of nocturia each night.  He does have to get to the bathroom quickly during the day but no significant incontinence reported.    Patient has had prior history of colon polyps.  Last noted colonoscopy was 2015.      ROS:  Denies any chest pain dyspnea diaphoresis or edema.  No reported palpitations or tachycardia. No polydipsia or polyphagia or polyuria.  No large weight fluctuations. No recurrent breakthrough dyspepsia or reflux.  No abdominal pain. No hematochezia melena diarrhea or constipation. No dysuria or hematuria. Denies any

## 2025-05-27 NOTE — RESULT ENCOUNTER NOTE
Call back LDL cholesterol greater than 70 at a value of 138.  I will send in a prescription for Crestor 20 mg daily and patient is to stop his pravastatin.  Electrolytes, kidney function, and liver function normal.  Patient does have abnormal protein in the urine and he should continue his valsartan.  CBC essentially normal.  PSA level good at 1.1.  A1c once again 9.2%.  He must check fasting blood sugars and blood sugars 2 hours after eating and write these down.  Last get A1c below 8%.  Please provide patient a sample of Ozempic with explanation and patient to take 0.25 mg SC weekly for 2 weeks and then increase to 0.5 mg SC weekly.  This addresses 4 of the 6 core defects of diabetes and greatly reduces cardiovascular risks and kidney risks.  He needs to let us know when he starts to run out of his Ozempic sample and then I can send in additional medicine prior to his follow-up.

## 2025-06-27 DIAGNOSIS — E11.65 UNCONTROLLED TYPE 2 DIABETES MELLITUS WITH HYPERGLYCEMIA (HCC): ICD-10-CM

## 2025-06-27 RX ORDER — INSULIN GLARGINE 100 [IU]/ML
INJECTION, SOLUTION SUBCUTANEOUS
Qty: 15 ML | Refills: 0 | OUTPATIENT
Start: 2025-06-27

## (undated) DEVICE — PAD,ABDOMINAL,5"X9",ST,LF,25/BX: Brand: MEDLINE INDUSTRIES, INC.

## (undated) DEVICE — COTTON UNDERCAST PADDING,REGULAR FINISH: Brand: WEBRIL

## (undated) DEVICE — DRAPE XR C ARM 41X74IN LF --

## (undated) DEVICE — SUTURE MCRYL SZ 2-0 L27IN ABSRB VLT CT-2 L26MM 1/2 CIR Y333H

## (undated) DEVICE — DRESSING,GAUZE,XEROFORM,CURAD,5"X9",ST: Brand: CURAD

## (undated) DEVICE — SPONGE GZ W4XL4IN COT 12 PLY TYP VII WVN C FLD DSGN

## (undated) DEVICE — DRAPE SHT 3 QTR PROXIMA 53X77 --

## (undated) DEVICE — (D)PREP SKN CHLRAPRP APPL 26ML -- CONVERT TO ITEM 371833

## (undated) DEVICE — ZIMMER® STERILE DISPOSABLE TOURNIQUET CUFF WITH PLC, DUAL PORT, SINGLE BLADDER, 12 IN. (30 CM)

## (undated) DEVICE — BUTTON SWITCH PENCIL BLADE ELECTRODE, HOLSTER: Brand: EDGE

## (undated) DEVICE — BANDAGE,ELASTIC,ESMARK,STERILE,4"X9',LF: Brand: MEDLINE

## (undated) DEVICE — Device

## (undated) DEVICE — SUTURE MCRYL SZ 2-0 L27IN ABSRB UD CP-1 1 L36MM 1/2 CIR REV Y266H

## (undated) DEVICE — AMD ANTIMICROBIAL GAUZE SPONGES,12 PLY USP TYPE VII, 0.2% POLYHEXAMETHYLENE BIGUANIDE HCI (PHMB): Brand: CURITY

## (undated) DEVICE — BIT DRL DIA2.8MM SHT CALIB QUIK CPL W/ STP PRO-PAK

## (undated) DEVICE — REM POLYHESIVE ADULT PATIENT RETURN ELECTRODE: Brand: VALLEYLAB

## (undated) DEVICE — SUT PROL 2-0 30IN CT2 BLU --

## (undated) DEVICE — DRESSING,GAUZE,XEROFORM,CURAD,1"X8",ST: Brand: CURAD

## (undated) DEVICE — SPLINT MAT XF SPEC 5X30IN --

## (undated) DEVICE — SOLUTION IV 1000ML 0.9% SOD CHL

## (undated) DEVICE — 2.0MM DRILL BIT WITH DEPTH MARK/QC/140MM

## (undated) DEVICE — 2.5MM DRILL BIT/QC/GOLD/110MM

## (undated) DEVICE — SLIM BODY SKIN STAPLER: Brand: APPOSE ULC

## (undated) DEVICE — SUTURE VCRL SZ 2-0 L36IN ABSRB UD L36MM CT-1 1/2 CIR J945H